# Patient Record
Sex: FEMALE | Race: WHITE | Employment: OTHER | URBAN - METROPOLITAN AREA
[De-identification: names, ages, dates, MRNs, and addresses within clinical notes are randomized per-mention and may not be internally consistent; named-entity substitution may affect disease eponyms.]

---

## 2017-06-09 PROBLEM — Z91.14 NON COMPLIANCE W MEDICATION REGIMEN: Status: ACTIVE | Noted: 2017-06-09

## 2017-07-05 ENCOUNTER — TELEPHONE (OUTPATIENT)
Dept: DIABETES SERVICES | Age: 77
End: 2017-07-05

## 2017-07-05 NOTE — TELEPHONE ENCOUNTER
Pt was a no show for her Diabetes Assessment this AM. Called and left a message to please call to reschedule assessment. Phone number provided.

## 2017-07-19 ENCOUNTER — TELEPHONE (OUTPATIENT)
Dept: DIABETES SERVICES | Age: 77
End: 2017-07-19

## 2018-01-03 PROBLEM — F33.9 RECURRENT DEPRESSION (HCC): Status: ACTIVE | Noted: 2018-01-03

## 2018-01-03 PROBLEM — E55.9 VITAMIN D DEFICIENCY: Status: ACTIVE | Noted: 2018-01-03

## 2018-05-11 PROBLEM — R01.1 MURMUR, CARDIAC: Status: ACTIVE | Noted: 2018-05-11

## 2019-05-14 PROBLEM — E11.21 TYPE 2 DIABETES WITH NEPHROPATHY (HCC): Status: ACTIVE | Noted: 2019-05-14

## 2019-09-01 ENCOUNTER — HOSPITAL ENCOUNTER (EMERGENCY)
Age: 79
Discharge: HOME | End: 2019-09-02
Attending: STUDENT IN AN ORGANIZED HEALTH CARE EDUCATION/TRAINING PROGRAM
Payer: MEDICARE

## 2019-09-01 ENCOUNTER — APPOINTMENT (OUTPATIENT)
Dept: GENERAL RADIOLOGY | Age: 79
End: 2019-09-01
Attending: STUDENT IN AN ORGANIZED HEALTH CARE EDUCATION/TRAINING PROGRAM
Payer: MEDICARE

## 2019-09-01 ENCOUNTER — APPOINTMENT (OUTPATIENT)
Dept: CT IMAGING | Age: 79
End: 2019-09-01
Attending: STUDENT IN AN ORGANIZED HEALTH CARE EDUCATION/TRAINING PROGRAM
Payer: MEDICARE

## 2019-09-01 DIAGNOSIS — S09.90XA CLOSED HEAD INJURY, INITIAL ENCOUNTER: Primary | ICD-10-CM

## 2019-09-01 DIAGNOSIS — S00.83XA FACIAL CONTUSION, INITIAL ENCOUNTER: ICD-10-CM

## 2019-09-01 DIAGNOSIS — H11.31 SUBCONJUNCTIVAL HEMORRHAGE OF RIGHT EYE: ICD-10-CM

## 2019-09-01 PROCEDURE — 73562 X-RAY EXAM OF KNEE 3: CPT

## 2019-09-01 PROCEDURE — 74011000250 HC RX REV CODE- 250: Performed by: STUDENT IN AN ORGANIZED HEALTH CARE EDUCATION/TRAINING PROGRAM

## 2019-09-01 PROCEDURE — 70450 CT HEAD/BRAIN W/O DYE: CPT

## 2019-09-01 PROCEDURE — 99284 EMERGENCY DEPT VISIT MOD MDM: CPT | Performed by: STUDENT IN AN ORGANIZED HEALTH CARE EDUCATION/TRAINING PROGRAM

## 2019-09-01 PROCEDURE — 70486 CT MAXILLOFACIAL W/O DYE: CPT

## 2019-09-01 PROCEDURE — 73110 X-RAY EXAM OF WRIST: CPT

## 2019-09-01 PROCEDURE — 73130 X-RAY EXAM OF HAND: CPT

## 2019-09-01 PROCEDURE — 73502 X-RAY EXAM HIP UNI 2-3 VIEWS: CPT

## 2019-09-01 RX ORDER — TETRACAINE HYDROCHLORIDE 5 MG/ML
1 SOLUTION OPHTHALMIC
Status: COMPLETED | OUTPATIENT
Start: 2019-09-01 | End: 2019-09-01

## 2019-09-01 RX ADMIN — FLUORESCEIN SODIUM 1 STRIP: 1 STRIP OPHTHALMIC at 22:16

## 2019-09-01 RX ADMIN — TETRACAINE HYDROCHLORIDE 1 DROP: 5 SOLUTION OPHTHALMIC at 22:16

## 2019-09-02 VITALS
OXYGEN SATURATION: 97 % | TEMPERATURE: 98.3 F | DIASTOLIC BLOOD PRESSURE: 61 MMHG | BODY MASS INDEX: 32.57 KG/M2 | WEIGHT: 177 LBS | RESPIRATION RATE: 16 BRPM | HEART RATE: 94 BPM | HEIGHT: 62 IN | SYSTOLIC BLOOD PRESSURE: 112 MMHG

## 2019-09-02 RX ORDER — TRAMADOL HYDROCHLORIDE 50 MG/1
50 TABLET ORAL
Qty: 10 TAB | Refills: 0 | Status: SHIPPED | OUTPATIENT
Start: 2019-09-02 | End: 2019-09-05

## 2019-09-02 NOTE — ED NOTES
I have reviewed discharge instructions with the patient. The patient verbalized understanding. Patient left ED via Discharge Method: ambulatory to Home with family. Opportunity for questions and clarification provided. Patient given 1 scripts. To continue your aftercare when you leave the hospital, you may receive an automated call from our care team to check in on how you are doing. This is a free service and part of our promise to provide the best care and service to meet your aftercare needs.  If you have questions, or wish to unsubscribe from this service please call [phone]. Thank you for Choosing our [facility] Emergency Department.

## 2019-09-02 NOTE — ED TRIAGE NOTES
Pt coming from home by [facility] for a fall. States she fell out of the bed tonight and hit her rigth eye. She has laceration right above her right eye. EMS states the sclera is bloody and difficult to assess. Patient states she is blind in her left eye and she can only see the light out of the right. Patient complaining of left and left knee pain. Patient just rolled out of the bed accidentally. Denies loss of consciousness. Ems gave 5mg of morphine IV and 4mg of zofran. Pain is down to a 4 after the morphine.

## 2019-09-02 NOTE — ED PROVIDER NOTES
21-year-old female patient presents to the emergency department via EMS with reports of right facial pain following a rule out of bed. Patient states she struck her face against a bedside dresser drawer. She denies loss of consciousness. Reports significant pain about the right face and eye. EMS states that patient reported that she could only see light out of the eye. No associated nausea, vomiting. She denies any neck pain. No lightheaded or dizzy feeling prior to or following the event. Patient is received 5 mg of morphine IV in route. She does not take blood thinners. The history is provided by the patient, the EMS personnel and a relative. No  was used. Eye Injury    This is a new problem. The current episode started 1 to 2 hours ago. The problem occurs constantly. The problem has not changed since onset. The right eye is affected. The injury mechanism was a direct trauma. The pain is moderate. Pertinent negatives include no numbness, no nausea, no vomiting, no weakness, no fever and no dizziness.         Past Medical History:   Diagnosis Date    Anatomical narrow angle borderline glaucoma 6/10/2015    Blindness of left eye     Chronic angle-closure glaucoma 6/10/2015    COPD     COPD (chronic obstructive pulmonary disease) ([facility] [address] 6/10/2015    Cortical, lamellar, or zonular cataract, nonsenile 6/10/2015    Depression     Diabetes ([address]     Diverticulosis     Gastrointestinal disorder     diverticultits x 2 episodes    GERD (gastroesophageal reflux disease) 6/10/2015    Hyperlipemia 6/10/2015    Hypertension 6/10/2015    Memory loss 6/10/2015    Methicillin resistant Staphylococcus aureus in conditions classified elsewhere and of unspecified site     Nearsightedness 6/10/2015    Neuropathy 6/10/2015    Nuclear cataract, nonsenile 6/10/2015    Obesity     Optic atrophy, 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    Osteoarthritis     Osteoarthrosis involving, or with mention of more than one site, but not specified as generalized, site unspecified(715.80) 6/10/2015    Other and combined forms of senile cataract 6/10/2015    Other ill-defined conditions(799.89)     back pain    Pain in limb     Psychiatric disorder     depression    Regular astigmatism 6/10/2015    Severe stage glaucoma 6/10/2015    Type II or unspecified type diabetes mellitus with neurological manifestations, not stated as uncontrolled(250.60) ([facility] [address] 9/27/9558    Umbilical hernia     no repair       Past Surgical History:   Procedure Laterality Date    HX APPENDECTOMY      HX CHOLECYSTECTOMY  2000    HX HEMORRHOIDECTOMY  1982    HX HYSTERECTOMY      HX OTHER SURGICAL  1978    benign growth removal from vocal cords    HX OTHER SURGICAL      full dentures r/t tooth extractions    HX OTHER SURGICAL  2001    both eyes optic nerve    HX OTHER SURGICAL  4/2012    lipoma removal    HX OTHER SURGICAL  1978    exploratory    HX OTHER SURGICAL  2/2012    removed blood clot from her anus    HX POLYPECTOMY  1992    HX TONSILLECTOMY           Family History:   Problem Relation Age of Onset    Diabetes Mother     Stroke Mother     Hypertension Mother     Diabetes Father     Heart Disease Father     Hypertension Father     Other Maternal Julián Daley tumors, cyst in kidneys    Diabetes Maternal Uncle     Cancer Paternal Aunt         lung    COPD Paternal Aunt     Cancer Paternal Uncle         throat cancer    Heart Disease Maternal Grandfather     Diabetes Maternal Grandfather     Cancer Paternal Grandfather         throat cancer    Other Other         clogged arteries diverticulitis    Breast Cancer Sister 72       Social History     Socioeconomic History    Marital status:      Spouse name: Not on file    Number of children: Not on file    Years of education: Not on file    Highest education level: Not on file   Occupational History    Not on file   Social Needs    Financial resource strain: Not on file    Food insecurity:     Worry: Not on file     Inability: Not on file    Transportation needs:     Medical: Not on file     Non-medical: Not on file   Tobacco Use    Smoking status: Former Smoker     Packs/day: 1.00     Years: 30.00     Pack years: 30.00     Last attempt to quit: 2000     Years since quittin.5    Smokeless tobacco: Never Used   Substance and Sexual Activity    Alcohol use: No    Drug use: No    Sexual activity: Not Currently   Lifestyle    Physical activity:     Days per week: Not on file     Minutes per session: Not on file    Stress: Not on file   Relationships    Social connections:     Talks on phone: Not on file     Gets together: Not on file     Attends Rastafarian service: Not on file     Active member of club or organization: Not on file     Attends meetings of clubs or organizations: Not on file     Relationship status: Not on file    Intimate partner violence:     Fear of current or ex partner: Not on file     Emotionally abused: Not on file     Physically abused: Not on file     Forced sexual activity: Not on file   Other Topics Concern    Not on file   Social History Narrative    Not on file         ALLERGIES: Demerol [meperidine] and Morphine    Review of Systems   Constitutional: Negative for chills, diaphoresis and fever. HENT: Negative for congestion, sneezing and sore throat. Eyes: Negative for visual disturbance. Respiratory: Negative for cough, chest tightness, shortness of breath and wheezing. Cardiovascular: Negative for chest pain and leg swelling. Gastrointestinal: Negative for abdominal pain, blood in stool, diarrhea, nausea and vomiting. Endocrine: Negative for polyuria. Genitourinary: Negative for difficulty urinating, dysuria, flank pain, hematuria and urgency. Musculoskeletal: Positive for arthralgias and myalgias. Negative for back pain, neck pain and neck stiffness.    Skin: Negative for color change and rash.   Neurological: Positive for headaches. Negative for dizziness, syncope, speech difficulty, weakness, light-headedness and numbness. Psychiatric/Behavioral: Negative for behavioral problems. All other systems reviewed and are negative. Vitals:    09/01/19 2154   BP: 142/73   Pulse: 97   Resp: 18   Temp: 98.2 °F (36.8 °C)   SpO2: 97%   Weight: 80.3 kg (177 lb)   Height: 5' 2\" (1.575 m)            Physical Exam   Constitutional: She is oriented to person, place, and time. She appears well-developed and well-nourished. No distress. Alert and oriented to person place and time. No acute distress, speaks in clear, fluid sentences. HENT:   Head: Normocephalic. Right Ear: External ear normal.   Left Ear: External ear normal.   Nose: Nose normal.   There is significant swelling to the right face with ecchymosis, near complete occlusion of the patient's right eye secondary to swelling of both the upper and lower lid. No hemotympanum or hernandez sign. Eyes: Pupils are equal, round, and reactive to light. EOM are normal. Right eye exhibits no chemosis, no discharge, no exudate and no hordeolum. No foreign body present in the right eye. Left eye exhibits no chemosis, no discharge, no exudate and no hordeolum. No foreign body present in the left eye. Right conjunctiva is not injected. Right conjunctiva has a hemorrhage. Left conjunctiva is not injected. Left conjunctiva has no hemorrhage. No scleral icterus. Right eye exhibits normal extraocular motion and no nystagmus. Left eye exhibits normal extraocular motion and no nystagmus. Right pupil is round and reactive. Left pupil is round and reactive. Pupils are equal.   Large subconjunctival hematoma covers the patient's entire sclera however the patient's right pupil appears normal.  It accommodates and is reactive to light on exam.  Extraocular muscles are intact. Legally blind in the left eye.     Floor seen staining reveals no focal uptake of stain, negative Luis A sign   Neck: Normal range of motion. Cardiovascular: Normal rate, regular rhythm, normal heart sounds and intact distal pulses. Exam reveals no gallop and no friction rub. No murmur heard. Pulmonary/Chest: Effort normal and breath sounds normal. No stridor. No respiratory distress. She has no decreased breath sounds. She has no wheezes. She has no rhonchi. She has no rales. She exhibits no tenderness. Abdominal: Soft. She exhibits no distension and no mass. There is no tenderness. There is no rebound and no guarding. No hernia. Musculoskeletal: Normal range of motion. She exhibits no edema, tenderness or deformity. Patient reports subjective pain to the left wrist and hand. There is a superficial abrasion to the left knee. Some pain with range of motion testing over the hip   Neurological: She is alert and oriented to person, place, and time. No cranial nerve deficit. Skin: Skin is warm and dry. She is not diaphoretic. Nursing note and vitals reviewed.        MDM  Number of Diagnoses or Management Options     Amount and/or Complexity of Data Reviewed  Tests in the radiology section of CPT®: ordered and reviewed  Tests in the medicine section of CPT®: ordered and reviewed    Risk of Complications, Morbidity, and/or Mortality  Presenting problems: moderate  Diagnostic procedures: low  Management options: moderate    Patient Progress  Patient progress: stable         Procedures

## 2019-09-02 NOTE — DISCHARGE INSTRUCTIONS
Patient Education      Arrange follow-up with [facility] eye group within 1 week. Return for worsening symptoms, concerns or questions. Take the medication for pain as needed. You should start with over-the-counter medications such as Tylenol or ibuprofen to manage pain. Apply ice to the area to help with pain and swelling. Arrange follow-up care within 1 week with your primary care provider as well. Head Injury: Care Instructions  Your Care Instructions    Most injuries to the head are minor. Bumps, cuts, and scrapes on the head and face usually heal well and can be treated the same as injuries to other parts of the body. Although it's rare, once in a while a more serious problem shows up after you are home. So it's good to be on the lookout for symptoms for a day or two. Follow-up care is a key part of your treatment and safety. Be sure to make and go to all appointments, and call your doctor if you are having problems. It's also a good idea to know your test results and keep a list of the medicines you take. How can you care for yourself at home? · Follow your doctor's instructions. He or she will tell you if you need someone to watch you closely for the next 24 hours or longer. · Take it easy for the next few days or more if you are not feeling well. · Ask your doctor when it's okay for you to go back to activities like driving a car, riding a bike, or operating machinery. When should you call for help? Call 911 anytime you think you may need emergency care.  For example, call if:    · You have a seizure.     · You passed out (lost consciousness).     · You are confused or can't stay awake.    Call your doctor now or seek immediate medical care if:    · You have new or worse vomiting.     · You feel less alert.     · You have new weakness or numbness in any part of your body.    Watch closely for changes in your health, and be sure to contact your doctor if:    · You do not get better as expected.     · You have new symptoms, such as headaches, trouble concentrating, or changes in mood. Where can you learn more? Go to http://elijah-christian.info/. Enter M146 in the search box to learn more about \"Head Injury: Care Instructions. \"  Current as of: March 28, 2019  Content Version: 12.1  © 5183-0194 [employer]. Care instructions adapted under license by [employer] (which disclaims liability or warranty for this information). If you have questions about a medical condition or this instruction, always ask your healthcare professional. [place] [address] any warranty or liability for your use of this information. Patient Education        Subconjunctival Hemorrhage: Care Instructions  Your Care Instructions    Sometimes small blood vessels in the white of the eye can break, causing a red spot or speck. This is called a subconjunctival hemorrhage. The blood vessels may break when you sneeze, cough, vomit, strain, or bend over. Sometimes there is no clear cause. The blood may look alarming, especially if the spot is large. If there is no pain or vision change, there is usually no reason to worry, and the blood slowly will go away on its own in 2 to 3 weeks. Follow-up care is a key part of your treatment and safety. Be sure to make and go to all appointments, and call your doctor if you are having problems. It's also a good idea to know your test results and keep a list of the medicines you take. How can you care for yourself at home? · Watch for changes in your eye. It is normal for the red spot on your eyeball to change color as it heals. Just like a bruise on your skin, it may change from red to brown to purple to yellow. · Do not take aspirin or products that contain aspirin, which can increase bleeding. Use acetaminophen (Tylenol) if you need pain relief for another problem.   · Do not take two or more pain medicines at the same time unless the doctor told you to. Many pain medicines have acetaminophen, which is Tylenol. Too much acetaminophen (Tylenol) can be harmful. When should you call for help? Call your doctor now or seek immediate medical care if:    · You have signs of an eye infection, such as:  ? Pus or thick discharge coming from the eye.  ? Redness or swelling around the eye.  ? A fever.     · You see blood over the black part of your eye (pupil).     · You have any changes or problems in your vision.     · You have any pain in your eye.    Watch closely for changes in your health, and be sure to contact your doctor if:    · You do not get better as expected. Where can you learn more? Go to http://elijah-christian.info/. Enter L493 in the search box to learn more about \"Subconjunctival Hemorrhage: Care Instructions. \"  Current as of: July 17, 2018  Content Version: 12.1  © 8631-7880 [employer]. Care instructions adapted under license by [employer] (which disclaims liability or warranty for this information). If you have questions about a medical condition or this instruction, always ask your healthcare professional. [address] any warranty or liability for your use of this information.

## 2020-02-04 PROBLEM — S61.259A CAT BITE OF FINGER: Status: ACTIVE | Noted: 2017-09-28

## 2020-02-04 PROBLEM — E86.0 DEHYDRATION, MODERATE: Status: ACTIVE | Noted: 2017-09-28

## 2020-02-04 PROBLEM — M65.9 FLEXOR TENOSYNOVITIS OF FINGER: Status: ACTIVE | Noted: 2017-09-29

## 2020-02-04 PROBLEM — L03.012 CELLULITIS OF LEFT INDEX FINGER: Status: ACTIVE | Noted: 2017-09-28

## 2020-02-04 PROBLEM — G63 POLYNEUROPATHY ASSOCIATED WITH UNDERLYING DISEASE (HCC): Status: ACTIVE | Noted: 2017-09-29
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
## 2020-04-27 ENCOUNTER — TELEPHONE (OUTPATIENT)
Dept: CASE MANAGEMENT | Age: 80
End: 2020-04-27

## 2020-04-27 NOTE — TELEPHONE ENCOUNTER
Robert Aponte was contacted to activate their [employer] account. Spoke with son and offered to assist with account setup. They declined to sign up at this time. If patient declined, why? Patient doesn't have Internet or email. If patient accepted, was ACP introduced?  Not applicable    Romain Ferrer LPN

## 2020-12-01 ENCOUNTER — HOSPITAL ENCOUNTER (OUTPATIENT)
Dept: SURGERY | Age: 80
Discharge: HOME | End: 2020-12-01

## 2020-12-07 ENCOUNTER — ANESTHESIA EVENT (OUTPATIENT)
Dept: ENDOSCOPY | Age: 80
End: 2020-12-07
Payer: MEDICARE

## 2020-12-07 VITALS — BODY MASS INDEX: 29.08 KG/M2 | HEIGHT: 62 IN | WEIGHT: 158 LBS

## 2020-12-07 NOTE — PERIOP NOTES
Patient verified name, , and procedure; assessment questions, med list and instructions reviewed with patient's spouse Jagdeep Brooke patient request.    Type: 1a; abbreviated assessment per anesthesia guidelines  Labs per surgeon: none  Labs per anesthesia: none    Instructed pt that they will be notified via office/GI LAB for time of arrival to GI lab. Follow diet and prep instructions per Dr Laila Willingham instructions as follows: You will be on a clear liquid diet the day before your procedure and you may have any of the following clear liquids:   Water.  Strained fruit juices, without pulp, including apple, orange, white grape, or white cranberry.  Limeade or lemonade.  Coffee or tea (do not use any non-dairy creamer.)   Chicken broth.  Gelatin desserts, without added fruit or toppings (no red or purple gelatin.)  You should NOT:   Do NOT drink any milk products or use any milk products in coffee or tea.  Do NOT drink anything with red or purple dye.  Do NOT drink any alcoholic beverages. Bath or shower the night before and the am of surgery with non-moisturizing soap. No lotions, oils, powders, cologne on skin. No make up, eye make up or jewelry. Wear loose fitting comfortable, clean clothing. Must have adult present in building the entire time and MUST bring someone with you or arrange for someone to drive you home after the test.      You may take medications up to 3 hours prior to your procedure with sips of water only.  Medications to take Gabapentin, Namenda;  32 units Levemir Flex touch the night before and morning of procedure; use and bring Proair inhaler , patient to hold  none    The following discharge instructions reviewed with patient: medication given during procedure may cause drowsiness for several hours, therefore, do not drive or operate machinery for remainder of the day, no alcohol on the day of your procedure, resume regular diet and activity unless otherwise directed, you may experience abdominal distention for several hours that is relieved by the passage of gas. Contact your physician if you have any of the following: fever or chills, severe abdominal pain or excessive amount of bleeding or a large amount when having a bowel movement.  Occasional specks of blood with bowel movement would not be unusual.

## 2020-12-08 ENCOUNTER — ANESTHESIA (OUTPATIENT)
Dept: ENDOSCOPY | Age: 80
End: 2020-12-08
Payer: MEDICARE

## 2020-12-08 ENCOUNTER — HOSPITAL ENCOUNTER (OUTPATIENT)
Age: 80
Setting detail: OUTPATIENT SURGERY
Discharge: HOME | End: 2020-12-08
Attending: SURGERY | Admitting: SURGERY
Payer: MEDICARE

## 2020-12-08 VITALS
HEART RATE: 75 BPM | HEIGHT: 62 IN | BODY MASS INDEX: 28.71 KG/M2 | WEIGHT: 156 LBS | DIASTOLIC BLOOD PRESSURE: 66 MMHG | OXYGEN SATURATION: 100 % | RESPIRATION RATE: 18 BRPM | TEMPERATURE: 97.7 F | SYSTOLIC BLOOD PRESSURE: 139 MMHG

## 2020-12-08 DIAGNOSIS — Z12.11 SCREEN FOR COLON CANCER: ICD-10-CM

## 2020-12-08 PROBLEM — K57.90 DIVERTICULOSIS: Status: ACTIVE | Noted: 2020-12-08

## 2020-12-08 LAB
GLUCOSE BLD STRIP.AUTO-MCNC: 176 MG/DL (ref 65–100)
GLUCOSE BLD STRIP.AUTO-MCNC: 250 MG/DL (ref 65–100)
GLUCOSE BLD STRIP.AUTO-MCNC: 346 MG/DL (ref 65–100)

## 2020-12-08 PROCEDURE — 74011000250 HC RX REV CODE- 250: Performed by: NURSE ANESTHETIST, CERTIFIED REGISTERED

## 2020-12-08 PROCEDURE — 82962 GLUCOSE BLOOD TEST: CPT

## 2020-12-08 PROCEDURE — 76040000026: Performed by: SURGERY

## 2020-12-08 PROCEDURE — G0121 COLON CA SCRN NOT HI RSK IND: HCPCS | Performed by: SURGERY

## 2020-12-08 PROCEDURE — 2709999900 HC NON-CHARGEABLE SUPPLY: Performed by: SURGERY

## 2020-12-08 PROCEDURE — 74011250636 HC RX REV CODE- 250/636: Performed by: NURSE ANESTHETIST, CERTIFIED REGISTERED

## 2020-12-08 PROCEDURE — 74011636637 HC RX REV CODE- 636/637: Performed by: ANESTHESIOLOGY

## 2020-12-08 PROCEDURE — 76060000032 HC ANESTHESIA 0.5 TO 1 HR: Performed by: SURGERY

## 2020-12-08 PROCEDURE — 74011250636 HC RX REV CODE- 250/636: Performed by: ANESTHESIOLOGY

## 2020-12-08 RX ORDER — SODIUM CHLORIDE, SODIUM LACTATE, POTASSIUM CHLORIDE, CALCIUM CHLORIDE 600; 310; 30; 20 MG/100ML; MG/100ML; MG/100ML; MG/100ML
100 INJECTION, SOLUTION INTRAVENOUS CONTINUOUS
Status: DISCONTINUED | OUTPATIENT
Start: 2020-12-08 | End: 2020-12-08 | Stop reason: HOSPADM

## 2020-12-08 RX ORDER — LIDOCAINE HYDROCHLORIDE 20 MG/ML
INJECTION, SOLUTION EPIDURAL; INFILTRATION; INTRACAUDAL; PERINEURAL AS NEEDED
Status: DISCONTINUED | OUTPATIENT
Start: 2020-12-08 | End: 2020-12-08 | Stop reason: HOSPADM

## 2020-12-08 RX ORDER — PROPOFOL 10 MG/ML
INJECTION, EMULSION INTRAVENOUS
Status: DISCONTINUED | OUTPATIENT
Start: 2020-12-08 | End: 2020-12-08 | Stop reason: HOSPADM

## 2020-12-08 RX ADMIN — LIDOCAINE HYDROCHLORIDE 100 MG: 20 INJECTION, SOLUTION EPIDURAL; INFILTRATION; INTRACAUDAL; PERINEURAL at 09:38

## 2020-12-08 RX ADMIN — PHENYLEPHRINE HYDROCHLORIDE 100 MCG: 10 INJECTION INTRAVENOUS at 10:11

## 2020-12-08 RX ADMIN — PHENYLEPHRINE HYDROCHLORIDE 100 MCG: 10 INJECTION INTRAVENOUS at 10:02

## 2020-12-08 RX ADMIN — PROPOFOL 100 MCG/KG/MIN: 10 INJECTION, EMULSION INTRAVENOUS at 09:38

## 2020-12-08 RX ADMIN — INSULIN HUMAN 5 UNITS: 100 INJECTION, SOLUTION PARENTERAL at 08:49

## 2020-12-08 RX ADMIN — SODIUM CHLORIDE, SODIUM LACTATE, POTASSIUM CHLORIDE, AND CALCIUM CHLORIDE 100 ML/HR: 600; 310; 30; 20 INJECTION, SOLUTION INTRAVENOUS at 08:07

## 2020-12-08 NOTE — ANESTHESIA POSTPROCEDURE EVALUATION
Procedure(s):  COLONOSCOPY/ BMI 29.     total IV anesthesia    Anesthesia Post Evaluation        Patient location during evaluation: PACU  Patient participation: complete - patient participated  Level of consciousness: awake and alert  Pain management: adequate  Airway patency: patent  Anesthetic complications: no  Cardiovascular status: acceptable  Respiratory status: acceptable  Hydration status: acceptable  Post anesthesia nausea and vomiting:  controlled  Final Post Anesthesia Temperature Assessment:  Normothermia (36.0-37.5 degrees C)      INITIAL Post-op Vital signs:   Vitals Value Taken Time   /66 12/8/2020 10:40 AM   Temp     Pulse 75 12/8/2020 10:40 AM   Resp 18 12/8/2020 10:40 AM   SpO2 100 % 12/8/2020 10:40 AM

## 2020-12-08 NOTE — H&P
Jazzy Jackson Derek    12/8/2020    Date of Admission:  12/8/2020      Subjective:     Patient is a 79 y.o.  female presents for screening colonoscopy. The patient reports constipation. The patient denies family history of colon cancer. The patient has had a colonoscopy in the past. She is a poor historian but thinks she had a colonoscopy 30 years ago. Otherwise there is no reported rectal bleeding or melena. No changes in appetite or unusual wt loss. No abdominal pain or bloating. No reported changes in bowel habits.           Patient Active Problem List    Diagnosis Date Noted    Screen for colon cancer 12/08/2020    Type 2 diabetes with nephropathy ([facility] [address] 05/14/2019    Murmur, cardiac 05/11/2018    Recurrent depression ([facility] [address] 01/03/2018    Vitamin D deficiency 01/03/2018    Flexor tenosynovitis of finger 09/29/2017    Polyneuropathy associated with underlying disease ([facility] [address] 09/29/2017    Cat bite of finger 09/28/2017    Cellulitis of left index finger 09/28/2017    Dehydration, moderate 09/28/2017    Non compliance w medication regimen 06/09/2017    Hypertension 06/10/2015    Hyperlipemia 06/10/2015    Osteoarthritis of multiple joints 06/10/2015    GERD (gastroesophageal reflux disease) 06/10/2015    Neuropathy 06/10/2015    COPD (chronic obstructive pulmonary disease) ([facility] [address] 06/10/2015    Type II diabetes mellitus with neurological manifestations, uncontrolled ([facility] [address] 06/10/2015    Memory loss 06/10/2015    Regular astigmatism 06/10/2015    Severe stage glaucoma 06/10/2015    Optic atrophy 06/10/2015    Cortical, lamellar, or zonular cataract, nonsenile 06/10/2015    Nuclear cataract, nonsenile 06/10/2015    Nearsightedness 06/10/2015    Chronic angle-closure glaucoma 06/10/2015    Other and combined forms of senile cataract 06/10/2015    Anatomical narrow angle borderline glaucoma 06/10/2015     Past Medical History:   Diagnosis Date    Anatomical narrow angle borderline glaucoma 6/10/2015    Blindness of left eye     Chronic angle-closure glaucoma 6/10/2015    COPD     COPD (chronic obstructive pulmonary disease) ([facility] [address] 06/10/2015    managed by family dr Morenita Marroquin Cortical, lamellar, or zonular cataract, nonsenile 6/10/2015    Dementia ([facility] [address]     Depression     Diabetes ([facility] [address]     Diverticulosis     Gastrointestinal disorder     diverticultits x 2 episodes    GERD (gastroesophageal reflux disease) 6/10/2015    Hyperlipemia 6/10/2015    Hypertension 6/10/2015    Memory loss 6/10/2015    Methicillin resistant Staphylococcus aureus in conditions classified elsewhere and of unspecified site     Nearsightedness 6/10/2015    Neuropathy 6/10/2015    Nuclear cataract, nonsenile 6/10/2015    Obesity     Optic atrophy, 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    Osteoarthritis     Osteoarthrosis involving, or with mention of more than one site, but not specified as generalized, site unspecified(715.80) 6/10/2015    Other and combined forms of senile cataract 6/10/2015    Other ill-defined conditions(799.89)     back pain    Pain in limb     Psychiatric disorder     depression    Regular astigmatism 6/10/2015    Severe stage glaucoma 6/10/2015    Type II or unspecified type diabetes mellitus with neurological manifestations, not stated as uncontrolled(250.60) ([facility] [address] 06/10/2015    does not do fbs checks; today 12/8/20 fbs 279; Hgb A1c: 9.3 on 6/8/60    Umbilical hernia     no repair      Past Surgical History:   Procedure Laterality Date    HX APPENDECTOMY      HX CHOLECYSTECTOMY  2000    HX HEMORRHOIDECTOMY  1982    HX HYSTERECTOMY      HX OTHER SURGICAL  1978    benign growth removal from vocal cords    HX OTHER SURGICAL      full dentures r/t tooth extractions    HX OTHER SURGICAL  2001    both eyes optic nerve    HX OTHER SURGICAL  4/2012    lipoma removal    HX OTHER SURGICAL  1978    exploratory    HX OTHER SURGICAL  2/2012    removed blood clot from her anus    HX POLYPECTOMY [address]        Prior to Admission Medications   Prescriptions Last Dose Informant Patient Reported? Taking? Blood-Glucose Meter monitoring kit   No No   Sig: One touch verio glucometer Dispense other if formulary requiresDispense 100 ea lancets/test stripsCall for questions   Lancets misc   No No   Sig: To be used with dispensed glucometer to test 5 times daily   OTHER   No No   Sig: One touch verio glucometer  Ok to dispense other if not formulary   OTHER   No No   Sig: One each mechanical hospital bed to improve respiratory symptoms and avoid falls due to decreased sight   ProAir HFA 90 mcg/actuation inhaler 2020 at Unknown time  No Yes   Sig: Take 1 Puff by inhalation three (3) times daily as needed for Wheezing or Shortness of Breath. Patient taking differently: Take 1 Puff by inhalation three (3) times daily as needed for Wheezing or Shortness of Breath. Take / use AM day of surgery  per anesthesia protocols. Indications: chronic obstructive pulmonary disease   calcium carbonate-vitamin D3 (CALTRATE 600 + D) 600 mg (1,500 mg)-800 unit chew 2020 at Unknown time  No Yes   Sig: Take 1 Tab by mouth daily. Indications: osteoporosis   cholecalciferol, vitamin D3, (VITAMIN D3) 2,000 unit tab   No No   Sig: Take 1 Tab by mouth daily. gabapentin (NEURONTIN) 600 mg tablet 2020 at Unknown time  No Yes   Sig: Take 0.5 Tabs by mouth two (2) times a day. Patient taking differently: Take 600 mg by mouth two (2) times a day. Takes 1/2 tablet BID; Take / use AM day of surgery  per anesthesia protocols. Indications: neuropathic pain   glucose blood VI test strips (BLOOD GLUCOSE TEST) strip   No No   Sig: One touch verio or other if other glucometer dispensed to test 5 times daily   insulin detemir U-100 (Levemir FlexTouch U-100 Insuln) 100 unit/mL (3 mL) inpn   No No   Si Units by SubCUTAneous route two (2) times a day.  Indications: type 2 diabetes mellitus   Patient taking differently: 40 Units by SubCUTAneous route two (2) times a day. Take 32 units the night before procedure and 32 units day of procedure  Indications: type 2 diabetes mellitus   insulin lispro (HumaLOG KwikPen Insulin) 200 unit/mL (3 mL) inpn 2020 at Unknown time  No Yes   Sig: 10 Units by SubCUTAneous route three (3) times daily (with meals). Patient taking differently: 10 Units by SubCUTAneous route three (3) times daily (with meals). Indications: type 2 diabetes mellitus   linaCLOtide (Linzess) 145 mcg cap capsule 2020 at Unknown time  No Yes   Sig: Take 1 Cap by mouth Daily (before breakfast). Patient taking differently: Take 145 mcg by mouth Daily (before breakfast). Indications: chronic idiopathic constipation   memantine (NAMENDA) 10 mg tablet 2020 at Unknown time  No Yes   Sig: Take 1 Tab by mouth two (2) times a day. Patient taking differently: Take 10 mg by mouth two (2) times a day. Take / use AM day of surgery  per anesthesia protocols. Indications: moderate to severe Alzheimer's type dementia   polyethylene glycol (MIRALAX) 17 gram/dose powder   No No   Sig: Take 17 g by mouth two (2) times a day. Indications: constipation   Patient taking differently: Take 17 g by mouth as needed. Indications: constipation   sodium-potassium-mag sulfate (Suprep Bowel Prep Kit) 17.5-3.13-1.6 gram solr oral solution 2020 at Unknown time  No Yes   Sig: Follow instructions given to you by the office, not the ones in the box. Indications: Follow instructions given to you by the office, not the ones in the box.       Facility-Administered Medications: None     Allergies   Allergen Reactions    Demerol [Meperidine] Nausea and Vomiting     Stomach upset    Morphine Nausea and Vomiting     Stomach upset      Social History     Tobacco Use    Smoking status: Former Smoker     Packs/day: 1.00     Years: 30.00     Pack years: 30.00     Last attempt to quit: 2000     Years since quittin.8    Smokeless tobacco: Never Used   Substance Use Topics    Alcohol use: No      Social History     Social History Narrative    Not on file     Family History   Problem Relation Age of Onset    Diabetes Mother    [de-identified] Stroke Mother     Hypertension Mother     Diabetes Father     Heart Disease Father     Hypertension Father     Other Maternal Esaw Victor Hugo tumors, cyst in kidneys    Diabetes Maternal Uncle     Cancer Paternal Aunt         lung    COPD Paternal Aunt     Cancer Paternal Uncle         throat cancer    Heart Disease Maternal Grandfather     Diabetes Maternal Grandfather     Cancer Paternal Grandfather         throat cancer    Other Other         clogged arteries diverticulitis    Breast Cancer Sister 72        Current Facility-Administered Medications   Medication Dose Route Frequency    lactated Ringers infusion  100 mL/hr IntraVENous CONTINUOUS       Review of Systems  A comprehensive review of systems was negative except for that written in the HPI.     Objective:     Vitals:    12/08/20 0749   BP: (!) 147/69   Pulse: (!) 109   Resp: 18   Temp: 97.7 °F (36.5 °C)   SpO2: 95%   Weight: 156 lb (70.8 kg)   Height: 5' 2\" (1.575 m)     PHYSICAL EXAM   Physical Examination: General appearance - alert, well appearing, and in no distress  Mental status - alert, oriented to person, place, and time  Eyes - pupils equal and reactive, extraocular eye movements intact  Nose - normal and patent, no erythema, discharge or polyps  Neck - supple, no significant adenopathy  Chest - clear to auscultation, no wheezes, rales or rhonchi, symmetric air entry  Heart - normal rate, regular rhythm, normal S1, S2, no murmurs, rubs, clicks or gallops  Abdomen - soft, nontender, nondistended, no masses or organomegaly  Neurological - alert, oriented, normal speech, no focal findings or movement disorder noted  Musculoskeletal - no joint tenderness, deformity or swelling  Extremities - peripheral pulses normal, no pedal edema, no clubbing or cyanosis  Skin - normal coloration and turgor, no rashes, no suspicious skin lesions noted      No results for input(s): WBC, HGB, HCT, PLT, HGBEXT, HCTEXT, PLTEXT in the last 72 hours. No results for input(s): NA, K, CL, GLU, CO2, BUN, CREA, MG, PHOS, TROIQ, INR, BNPP, INREXT in the last 72 hours. No lab exists for component: TROIP  No results for input(s): PH, PCO2, PO2, HCO3 in the last 72 hours.     Assessment:     Hospital Problems  Date Reviewed: 11/16/2020          Codes Class Noted POA    * (Principal) Screen for colon cancer ICD-10-CM: Z12.11  ICD-9-CM: V76.51  12/8/2020 Unknown            Plan:   Screening colonoscopy        Sachin Lara, DO

## 2020-12-08 NOTE — PROCEDURES
Procedure in Detail:  Informed consent was obtained for the procedure. The patient was placed in the left lateral decubitus position and sedation was induced by anesthesia. The scope was inserted into the rectum and advanced under direct vision to the cecum, which was identified by the ileocecal valve and appendiceal orifice. The quality of the colonic preparation was fair. A careful inspection was made as the colonoscope was withdrawn, including a retroflexed view of the rectum; findings and interventions are described below. Appropriate photodocumentation was obtained. Findings:   Rectum:   Normal  Sigmoid:     - Excavated lesions:     - Diverticulosis  Descending Colon:   Normal  Transverse Colon:   Normal  Ascending Colon:   Normal  Cecum:   Normal  Terminal Ileum:   Not entered      Specimens: No specimens were collected. Complications: None; patient tolerated the procedure well. \    EBL - minimal    Recommendations:   - For colon cancer screening in this average-risk patient, colonoscopy may be repeated in 10 years.      Signed By: Gerard Lee DO                        December 8, 2020

## 2020-12-08 NOTE — DISCHARGE INSTRUCTIONS
Gastrointestinal Colonoscopy/Flexible Sigmoidoscopy - Lower Exam Discharge Instructions  1. Call Dr. Vianca Flanagan for any problems or questions. 2. Contact the doctors office for follow up appointment as directed  3. Ordinarily, you may resume regular diet and activity after exam unless otherwise specified by your physician. 4. Because of air put into your colon during exam, you may experience some abdominal distension, relieved by the passage of gas, for several hours. 5. Contact your physician if you have any of the following:  · Excessive amount of bleeding - large amount when having a bowel movement. Occasional specks of blood with bowel movement would not be unusual.  · Severe abdominal pain  · Fever or Chills  After general anesthesia or intravenous sedation, for 24 hours or while taking prescription Narcotics:  · Limit your activities  · A responsible adult needs to be with you for the next 24 hours  · Do not drive and operate hazardous machinery  · Do not make important personal or business decisions  · Do not drink alcoholic beverages  · If you have not urinated within 8 hours after discharge, and you are experiencing discomfort from urinary retention, please go to the nearest ED. · If you have sleep apnea and have a CPAP machine, please use it for all naps and sleeping. · Please use caution when taking narcotics and any of your home medications that may cause drowsiness. *  Please give a list of your current medications to your Primary Care Provider. *  Please update this list whenever your medications are discontinued, doses are      changed, or new medications (including over-the-counter products) are added. *  Please carry medication information at all times in case of emergency situations.     These are general instructions for a healthy lifestyle:  No smoking/ No tobacco products/ Avoid exposure to second hand smoke  Surgeon General's Warning:  Quitting smoking now greatly reduces serious risk to your health. Obesity, smoking, and sedentary lifestyle greatly increases your risk for illness  A healthy diet, regular physical exercise & weight monitoring are important for maintaining a healthy lifestyle    You may be retaining fluid if you have a history of heart failure or if you experience any of the following symptoms:  Weight gain of 3 pounds or more overnight or 5 pounds in a week, increased swelling in our hands or feet or shortness of breath while lying flat in bed. Please call your doctor as soon as you notice any of these symptoms; do not wait until your next office visit.

## 2020-12-08 NOTE — ANESTHESIA PREPROCEDURE EVALUATION
Relevant Problems   No relevant active problems       Anesthetic History   No history of anesthetic complications            Review of Systems / Medical History  Patient summary reviewed and pertinent labs reviewed    Pulmonary    COPD               Neuro/Psych         Psychiatric history and dementia (very mild)    Comments: Neuropathy  Cardiovascular    Hypertension          Hyperlipidemia    Exercise tolerance[de-identified] Questionable 4 METs - denied chest pain, SOB, syncope      GI/Hepatic/Renal     GERD           Endo/Other    Diabetes: poorly controlled    Arthritis     Other Findings              Physical Exam    Airway  Mallampati: I  TM Distance: 4 - 6 cm  Neck ROM: normal range of motion   Mouth opening: Normal     Cardiovascular    Rhythm: regular  Rate: normal    Murmur (faint): Grade 1, Aortic area  Pertinent negatives: No peripheral edema   Dental    Dentition: Edentulous     Pulmonary  Breath sounds clear to auscultation               Abdominal  GI exam deferred       Other Findings            Anesthetic Plan    ASA: 3  Anesthesia type: total IV anesthesia          Induction: Intravenous  Anesthetic plan and risks discussed with: Patient and Spouse      Of note, patient with h/o poorly controlled DM. Her blood sugar this morning was 346. After speaking with her and her spouse, they report that it has been poorly controlled at home as they haven't been able to find an appropriate medication regimen and patient is poorly compliant with her diet. I counseled them in depth on the importance of good blood sugar control and the risks that poorly controlled DM can impose. Patient has already completed her bowel prep. I informed the patient and her spouse of her increased risk with anesthesia and she and her spouse wish to proceed with the procedure today.

## 2021-01-07 PROBLEM — Z12.11 SCREEN FOR COLON CANCER: Status: RESOLVED | Noted: 2020-12-08 | Resolved: 2021-01-07

## 2021-02-16 PROBLEM — Z91.81 AT MODERATE RISK FOR FALL: Status: ACTIVE | Noted: 2021-02-16

## 2021-05-13 PROBLEM — Z91.81 AT HIGH RISK FOR FALLS: Status: ACTIVE | Noted: 2021-05-13

## 2021-09-16 DIAGNOSIS — I35.0 NONRHEUMATIC AORTIC VALVE STENOSIS: Primary | ICD-10-CM

## 2021-09-17 ENCOUNTER — TELEPHONE (OUTPATIENT)
Dept: CASE MANAGEMENT | Age: 81
End: 2021-09-17

## 2021-09-17 NOTE — TELEPHONE ENCOUNTER
Spoke with pt  regarding plans for upcoming appts scheduled for 9/24 with arrival time @1015. Instructed pt to enter hospital on the outpatient side ([address] go to 2nd floor, and check in at radiology. No food 4 hr prior and nothing to drink 2 hr prior to the CT scan except sips of water with medications. Hold am insulin. TAVR protocol CT: 10:45 am  Carotid ultrasound: 11:30 am      Pt verbalized understanding and contact information ([phone]) provided for any further questions.     Jacy Prado, Structural Heart Navigator

## 2021-09-24 ENCOUNTER — HOSPITAL ENCOUNTER (OUTPATIENT)
Dept: ULTRASOUND IMAGING | Age: 81
Discharge: HOME | End: 2021-09-24
Attending: INTERNAL MEDICINE
Payer: MEDICARE

## 2021-09-24 ENCOUNTER — HOSPITAL ENCOUNTER (OUTPATIENT)
Dept: CT IMAGING | Age: 81
Discharge: HOME | End: 2021-09-24
Attending: INTERNAL MEDICINE
Payer: MEDICARE

## 2021-09-24 ENCOUNTER — TELEPHONE (OUTPATIENT)
Dept: CASE MANAGEMENT | Age: 81
End: 2021-09-24

## 2021-09-24 DIAGNOSIS — I35.0 NONRHEUMATIC AORTIC VALVE STENOSIS: ICD-10-CM

## 2021-09-24 LAB — CREAT BLD-MCNC: 0.76 MG/DL (ref 0.8–1.5)

## 2021-09-24 PROCEDURE — 74011000258 HC RX REV CODE- 258: Performed by: INTERNAL MEDICINE

## 2021-09-24 PROCEDURE — 71275 CT ANGIOGRAPHY CHEST: CPT

## 2021-09-24 PROCEDURE — 82565 ASSAY OF CREATININE: CPT

## 2021-09-24 PROCEDURE — 93880 EXTRACRANIAL BILAT STUDY: CPT

## 2021-09-24 PROCEDURE — 75574 CT ANGIO HRT W/3D IMAGE: CPT

## 2021-09-24 PROCEDURE — 74011000636 HC RX REV CODE- 636: Performed by: INTERNAL MEDICINE

## 2021-09-24 RX ORDER — SODIUM CHLORIDE 0.9 % (FLUSH) 0.9 %
10 SYRINGE (ML) INJECTION
Status: COMPLETED | OUTPATIENT
Start: 2021-09-24 | End: 2021-09-24

## 2021-09-24 RX ADMIN — Medication 10 ML: at 11:09

## 2021-09-24 RX ADMIN — IOPAMIDOL 75 ML: 755 INJECTION, SOLUTION INTRAVENOUS at 11:06

## 2021-09-24 RX ADMIN — SODIUM CHLORIDE 100 ML: 900 INJECTION, SOLUTION INTRAVENOUS at 11:09

## 2021-09-24 NOTE — TELEPHONE ENCOUNTER
Patient instructions given for cardiac catheterization with possible intervention with Dr Francisco J Caraballo. Scheduled for 9/30 at 930, arrival time 2 hr prior. Patient instructed to bring all home medications in labeled bottles on the day of procedure or a list with the dosages. NPO after midnight the night prior to the procedure, except for medications. Patient informed to take Aspirin 81 mg x 4 on the day of procedure. Take 1/2 of insulin dose the night prior to the procedure and hold insulin am of procedure   Instructed they can take all other medications excluding vitamins & supplements. Patient verbalizes understanding of all instructions & denies any questions at this time. Informed that [facility], or someone from cath lab, may contact them with final details prior to cardiac catheterization. Informed also for potential overnight stay if an intervention is completed. Contact info provided.      Bishop Encinas, Structural Heart Navigator

## 2021-09-24 NOTE — NURSE NAVIGATOR
Educational information/pamphlet provided to the pt regarding aortic stenosis and treatment options (SAVR and TAVR) along with the CardioSmart tool for Shared decision making. Also explained that CT will be to evaluate pt for potential TAVR. Contact information provided for any further questions.     Tien Holt, Structural Heart Navigator

## 2021-09-27 PROCEDURE — 75571 CT HRT W/O DYE W/CA TEST: CPT | Performed by: INTERNAL MEDICINE

## 2021-09-27 PROCEDURE — 75574 CT ANGIO HRT W/3D IMAGE: CPT | Performed by: INTERNAL MEDICINE

## 2021-09-30 ENCOUNTER — HOSPITAL ENCOUNTER (OUTPATIENT)
Age: 81
Setting detail: OBSERVATION
Discharge: HOME | End: 2021-10-01
Attending: INTERNAL MEDICINE | Admitting: INTERNAL MEDICINE
Payer: MEDICARE

## 2021-09-30 DIAGNOSIS — Z98.61 POST PTCA: ICD-10-CM

## 2021-09-30 DIAGNOSIS — I10 PRIMARY HYPERTENSION: ICD-10-CM

## 2021-09-30 DIAGNOSIS — I35.0 NONRHEUMATIC AORTIC VALVE STENOSIS: ICD-10-CM

## 2021-09-30 LAB
ACT BLD: 577 SECS (ref 70–128)
ANION GAP SERPL CALC-SCNC: 4 MMOL/L (ref 7–16)
ATRIAL RATE: 70 BPM
BUN SERPL-MCNC: 13 MG/DL (ref 8–23)
CALCIUM SERPL-MCNC: 9.4 MG/DL (ref 8.3–10.4)
CALCULATED R AXIS, ECG10: -55 DEGREES
CALCULATED T AXIS, ECG11: 85 DEGREES
CHLORIDE SERPL-SCNC: 108 MMOL/L (ref 98–107)
CO2 SERPL-SCNC: 29 MMOL/L (ref 21–32)
CREAT SERPL-MCNC: 0.91 MG/DL (ref 0.6–1)
DIAGNOSIS, 93000: NORMAL
ERYTHROCYTE [DISTWIDTH] IN BLOOD BY AUTOMATED COUNT: 12.3 % (ref 11.9–14.6)
GLUCOSE BLD STRIP.AUTO-MCNC: 132 MG/DL (ref 65–100)
GLUCOSE BLD STRIP.AUTO-MCNC: 170 MG/DL (ref 65–100)
GLUCOSE BLD STRIP.AUTO-MCNC: 182 MG/DL (ref 65–100)
GLUCOSE SERPL-MCNC: 178 MG/DL (ref 65–100)
HCT VFR BLD AUTO: 37.6 % (ref 35.8–46.3)
HGB BLD-MCNC: 12.9 G/DL (ref 11.7–15.4)
MCH RBC QN AUTO: 31.9 PG (ref 26.1–32.9)
MCHC RBC AUTO-ENTMCNC: 34.3 G/DL (ref 31.4–35)
MCV RBC AUTO: 92.8 FL (ref 79.6–97.8)
NRBC # BLD: 0 K/UL (ref 0–0.2)
PLATELET # BLD AUTO: 143 K/UL (ref 150–450)
PMV BLD AUTO: 10.3 FL (ref 9.4–12.3)
POTASSIUM SERPL-SCNC: 4.2 MMOL/L (ref 3.5–5.1)
Q-T INTERVAL, ECG07: 430 MS
QRS DURATION, ECG06: 98 MS
QTC CALCULATION (BEZET), ECG08: 467 MS
RBC # BLD AUTO: 4.05 M/UL (ref 4.05–5.2)
SERVICE CMNT-IMP: ABNORMAL
SODIUM SERPL-SCNC: 141 MMOL/L (ref 136–145)
VENTRICULAR RATE, ECG03: 71 BPM
WBC # BLD AUTO: 4.8 K/UL (ref 4.3–11.1)

## 2021-09-30 PROCEDURE — 77030029997 HC DEV COM RDL R BND TELE -B: Performed by: INTERNAL MEDICINE

## 2021-09-30 PROCEDURE — 92978 ENDOLUMINL IVUS OCT C 1ST: CPT | Performed by: INTERNAL MEDICINE

## 2021-09-30 PROCEDURE — 80048 BASIC METABOLIC PNL TOTAL CA: CPT

## 2021-09-30 PROCEDURE — 92928 PRQ TCAT PLMT NTRAC ST 1 LES: CPT | Performed by: INTERNAL MEDICINE

## 2021-09-30 PROCEDURE — C1874 STENT, COATED/COV W/DEL SYS: HCPCS | Performed by: INTERNAL MEDICINE

## 2021-09-30 PROCEDURE — 74011000258 HC RX REV CODE- 258: Performed by: INTERNAL MEDICINE

## 2021-09-30 PROCEDURE — C1753 CATH, INTRAVAS ULTRASOUND: HCPCS | Performed by: INTERNAL MEDICINE

## 2021-09-30 PROCEDURE — 99152 MOD SED SAME PHYS/QHP 5/>YRS: CPT | Performed by: INTERNAL MEDICINE

## 2021-09-30 PROCEDURE — 82962 GLUCOSE BLOOD TEST: CPT

## 2021-09-30 PROCEDURE — 93005 ELECTROCARDIOGRAM TRACING: CPT | Performed by: INTERNAL MEDICINE

## 2021-09-30 PROCEDURE — C1769 GUIDE WIRE: HCPCS | Performed by: INTERNAL MEDICINE

## 2021-09-30 PROCEDURE — 99153 MOD SED SAME PHYS/QHP EA: CPT | Performed by: INTERNAL MEDICINE

## 2021-09-30 PROCEDURE — 74011250637 HC RX REV CODE- 250/637: Performed by: INTERNAL MEDICINE

## 2021-09-30 PROCEDURE — 85347 COAGULATION TIME ACTIVATED: CPT

## 2021-09-30 PROCEDURE — 74011636637 HC RX REV CODE- 636/637: Performed by: INTERNAL MEDICINE

## 2021-09-30 PROCEDURE — 85027 COMPLETE CBC AUTOMATED: CPT

## 2021-09-30 PROCEDURE — C1757 CATH, THROMBECTOMY/EMBOLECT: HCPCS | Performed by: INTERNAL MEDICINE

## 2021-09-30 PROCEDURE — 74011250636 HC RX REV CODE- 250/636: Performed by: INTERNAL MEDICINE

## 2021-09-30 PROCEDURE — 74011000250 HC RX REV CODE- 250: Performed by: INTERNAL MEDICINE

## 2021-09-30 PROCEDURE — 93454 CORONARY ARTERY ANGIO S&I: CPT | Performed by: INTERNAL MEDICINE

## 2021-09-30 PROCEDURE — 74011000636 HC RX REV CODE- 636: Performed by: INTERNAL MEDICINE

## 2021-09-30 PROCEDURE — C1725 CATH, TRANSLUMIN NON-LASER: HCPCS | Performed by: INTERNAL MEDICINE

## 2021-09-30 PROCEDURE — C1894 INTRO/SHEATH, NON-LASER: HCPCS | Performed by: INTERNAL MEDICINE

## 2021-09-30 PROCEDURE — 77030015766: Performed by: INTERNAL MEDICINE

## 2021-09-30 PROCEDURE — 99218 HC RM OBSERVATION: CPT

## 2021-09-30 PROCEDURE — 2709999900 HC NON-CHARGEABLE SUPPLY

## 2021-09-30 PROCEDURE — C1887 CATHETER, GUIDING: HCPCS | Performed by: INTERNAL MEDICINE

## 2021-09-30 PROCEDURE — 77030016699 HC CATH ANGI DX INFN1 CARD -A: Performed by: INTERNAL MEDICINE

## 2021-09-30 DEVICE — STENT RONYX20030UX RESOLUTE ONYX 2.00X30
Type: IMPLANTABLE DEVICE | Status: FUNCTIONAL
Brand: RESOLUTE ONYX™

## 2021-09-30 DEVICE — STENT RONYX27522UX RESOLUTE ONYX 2.75X22
Type: IMPLANTABLE DEVICE | Status: FUNCTIONAL
Brand: RESOLUTE ONYX™

## 2021-09-30 RX ORDER — SODIUM NITROPRUSSIDE 25 MG/ML
INJECTION INTRAVENOUS AS NEEDED
Status: DISCONTINUED | OUTPATIENT
Start: 2021-09-30 | End: 2021-09-30 | Stop reason: HOSPADM

## 2021-09-30 RX ORDER — ACETAMINOPHEN 325 MG/1
650 TABLET ORAL
Status: DISCONTINUED | OUTPATIENT
Start: 2021-09-30 | End: 2021-10-01 | Stop reason: HOSPADM

## 2021-09-30 RX ORDER — INSULIN GLARGINE 100 [IU]/ML
50 INJECTION, SOLUTION SUBCUTANEOUS
Status: DISCONTINUED | OUTPATIENT
Start: 2021-09-30 | End: 2021-10-01 | Stop reason: HOSPADM

## 2021-09-30 RX ORDER — CLOPIDOGREL BISULFATE 75 MG/1
TABLET ORAL AS NEEDED
Status: DISCONTINUED | OUTPATIENT
Start: 2021-09-30 | End: 2021-09-30 | Stop reason: HOSPADM

## 2021-09-30 RX ORDER — SODIUM CHLORIDE 0.9 % (FLUSH) 0.9 %
5-40 SYRINGE (ML) INJECTION EVERY 8 HOURS
Status: DISCONTINUED | OUTPATIENT
Start: 2021-09-30 | End: 2021-10-01 | Stop reason: HOSPADM

## 2021-09-30 RX ORDER — HEPARIN SODIUM 200 [USP'U]/100ML
INJECTION, SOLUTION INTRAVENOUS
Status: DISCONTINUED | OUTPATIENT
Start: 2021-09-30 | End: 2021-09-30 | Stop reason: HOSPADM

## 2021-09-30 RX ORDER — CLOPIDOGREL BISULFATE 75 MG/1
75 TABLET ORAL DAILY
Status: DISCONTINUED | OUTPATIENT
Start: 2021-10-01 | End: 2021-10-01 | Stop reason: HOSPADM

## 2021-09-30 RX ORDER — SODIUM CHLORIDE 9 MG/ML
75 INJECTION, SOLUTION INTRAVENOUS CONTINUOUS
Status: DISCONTINUED | OUTPATIENT
Start: 2021-09-30 | End: 2021-09-30

## 2021-09-30 RX ORDER — GUAIFENESIN 100 MG/5ML
81 LIQUID (ML) ORAL DAILY
Status: DISCONTINUED | OUTPATIENT
Start: 2021-10-01 | End: 2021-10-01 | Stop reason: HOSPADM

## 2021-09-30 RX ORDER — MIDAZOLAM HYDROCHLORIDE 1 MG/ML
INJECTION, SOLUTION INTRAMUSCULAR; INTRAVENOUS AS NEEDED
Status: DISCONTINUED | OUTPATIENT
Start: 2021-09-30 | End: 2021-09-30 | Stop reason: HOSPADM

## 2021-09-30 RX ORDER — SODIUM CHLORIDE 0.9 % (FLUSH) 0.9 %
5-40 SYRINGE (ML) INJECTION AS NEEDED
Status: DISCONTINUED | OUTPATIENT
Start: 2021-09-30 | End: 2021-10-01 | Stop reason: HOSPADM

## 2021-09-30 RX ORDER — IPRATROPIUM BROMIDE AND ALBUTEROL SULFATE 2.5; .5 MG/3ML; MG/3ML
3 SOLUTION RESPIRATORY (INHALATION)
Status: DISCONTINUED | OUTPATIENT
Start: 2021-09-30 | End: 2021-10-01 | Stop reason: HOSPADM

## 2021-09-30 RX ORDER — HYDROXYZINE 25 MG/1
25 TABLET, FILM COATED ORAL
Status: DISCONTINUED | OUTPATIENT
Start: 2021-09-30 | End: 2021-10-01 | Stop reason: HOSPADM

## 2021-09-30 RX ORDER — FAMOTIDINE 20 MG/1
20 TABLET, FILM COATED ORAL EVERY 24 HOURS
Status: DISCONTINUED | OUTPATIENT
Start: 2021-09-30 | End: 2021-10-01 | Stop reason: HOSPADM

## 2021-09-30 RX ORDER — MAG HYDROX/ALUMINUM HYD/SIMETH 200-200-20
SUSPENSION, ORAL (FINAL DOSE FORM) ORAL AS NEEDED
Status: DISCONTINUED | OUTPATIENT
Start: 2021-09-30 | End: 2021-09-30 | Stop reason: HOSPADM

## 2021-09-30 RX ORDER — GUAIFENESIN 100 MG/5ML
81 LIQUID (ML) ORAL ONCE
Status: COMPLETED | OUTPATIENT
Start: 2021-09-30 | End: 2021-09-30

## 2021-09-30 RX ORDER — FENTANYL CITRATE 50 UG/ML
INJECTION, SOLUTION INTRAMUSCULAR; INTRAVENOUS AS NEEDED
Status: DISCONTINUED | OUTPATIENT
Start: 2021-09-30 | End: 2021-09-30 | Stop reason: HOSPADM

## 2021-09-30 RX ORDER — HYDROCODONE BITARTRATE AND ACETAMINOPHEN 5; 325 MG/1; MG/1
1 TABLET ORAL
Status: DISCONTINUED | OUTPATIENT
Start: 2021-09-30 | End: 2021-10-01 | Stop reason: HOSPADM

## 2021-09-30 RX ORDER — LIDOCAINE HYDROCHLORIDE 10 MG/ML
INJECTION INFILTRATION; PERINEURAL AS NEEDED
Status: DISCONTINUED | OUTPATIENT
Start: 2021-09-30 | End: 2021-09-30 | Stop reason: HOSPADM

## 2021-09-30 RX ORDER — SODIUM CHLORIDE 9 MG/ML
75 INJECTION, SOLUTION INTRAVENOUS CONTINUOUS
Status: DISCONTINUED | OUTPATIENT
Start: 2021-09-30 | End: 2021-10-01 | Stop reason: HOSPADM

## 2021-09-30 RX ORDER — NITROGLYCERIN 0.4 MG/1
0.4 TABLET SUBLINGUAL
Status: DISCONTINUED | OUTPATIENT
Start: 2021-09-30 | End: 2021-10-01 | Stop reason: HOSPADM

## 2021-09-30 RX ORDER — DULOXETIN HYDROCHLORIDE 30 MG/1
30 CAPSULE, DELAYED RELEASE ORAL DAILY
Status: DISCONTINUED | OUTPATIENT
Start: 2021-10-01 | End: 2021-10-01 | Stop reason: HOSPADM

## 2021-09-30 RX ADMIN — Medication 10 ML: at 21:09

## 2021-09-30 RX ADMIN — FAMOTIDINE 20 MG: 20 TABLET ORAL at 17:04

## 2021-09-30 RX ADMIN — Medication 10 ML: at 13:44

## 2021-09-30 RX ADMIN — SODIUM CHLORIDE 75 ML/HR: 900 INJECTION, SOLUTION INTRAVENOUS at 13:43

## 2021-09-30 RX ADMIN — SODIUM CHLORIDE 75 ML/HR: 900 INJECTION, SOLUTION INTRAVENOUS at 08:06

## 2021-09-30 RX ADMIN — INSULIN GLARGINE 50 UNITS: 100 INJECTION, SOLUTION SUBCUTANEOUS at 21:09

## 2021-09-30 RX ADMIN — ASPIRIN 81 MG: 81 TABLET, CHEWABLE ORAL at 08:06

## 2021-09-30 NOTE — PROGRESS NOTES
Beto Ewing. MD Mabel Green. Katty Grimm MD           [facility] 6         9/20/2021 1940    REFERRING PHYSICIAN:  Dr. Sammy Espinoza:  The patient is a [de-identified] y.o. female who is seen for evaluation of aortic stenosis. She recently established care with new PCP who noted a cardiac murmur. Echocardiogram showed critical AS with MARTIN of 0.5cm2, mean gradient of 60mmHg and peak gradient of 96.4mmHg. She was seen in the office by Dr Jareth Sosa. LHC was planned. She underwent cardiac catheterization today that showed obstructive disease in the LAD. 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 She denies any chest discomfort but her  states she has complained of this. She complains of frequent dizziness. She denies any syncopal episodes. She uses a walker at baseline and has history of multiple falls.      Past Medical History:   Diagnosis Date    Anatomical narrow angle borderline glaucoma 6/10/2015    Blindness of left eye     Chronic angle-closure glaucoma 6/10/2015    COPD     COPD (chronic obstructive pulmonary disease) ([address] 06/10/2015    managed by family dr Monroe Cortical, lamellar, or zonular cataract, nonsenile 6/10/2015    Dementia ([address]     Depression     Diabetes ([address]     Diverticulosis     Gastrointestinal disorder     diverticultits x 2 episodes    GERD (gastroesophageal reflux disease) 6/10/2015    Hyperlipemia 6/10/2015    Hypertension 6/10/2015    Memory loss 6/10/2015    Methicillin resistant Staphylococcus aureus in conditions classified elsewhere and of unspecified site     Nearsightedness 6/10/2015    Neuropathy 6/10/2015    Nuclear cataract, nonsenile 6/10/2015    Obesity     Optic atrophy, 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    Osteoarthritis     Osteoarthrosis involving, or with mention of more than one site, but not specified as generalized, site unspecified(715.80) 6/10/2015    Other and combined forms of senile cataract 6/10/2015    Other ill-defined conditions(799.89)     back pain    Pain in limb     Psychiatric disorder     depression    Regular astigmatism 6/10/2015    Severe stage glaucoma 6/10/2015    Type II or unspecified type diabetes mellitus with neurological manifestations, not stated as uncontrolled(250.60) ([facility] 06/10/2015    does not do fbs checks; today 12/8/20 fbs 279; Hgb A1c: 9.3 on 0/0/43    Umbilical hernia     no repair       Past Surgical History:   Procedure Laterality Date    COLONOSCOPY N/A 12/8/2020    COLONOSCOPY/ BMI 29 performed by Stephanie Alcantara DO at [address] [facility] HX APPENDECTOMY      HX CHOLECYSTECTOMY  2000    HX HEMORRHOIDECTOMY  1982    HX HYSTERECTOMY      HX OTHER SURGICAL  1978    benign growth removal from vocal cords    HX OTHER SURGICAL      full dentures r/t tooth extractions    HX OTHER SURGICAL  2001    both eyes optic nerve    HX OTHER SURGICAL  4/2012    lipoma removal    HX OTHER SURGICAL  1978    exploratory    HX OTHER SURGICAL  2/2012    removed blood clot from her anus    HX POLYPECTOMY  1992    HX TONSILLECTOMY         Family History   Problem Relation Age of Onset    Diabetes Mother     Stroke Mother     Hypertension Mother     Diabetes Father     Heart Disease Father     Hypertension Father     Other Maternal Yoselin Jose tumors, cyst in kidneys    Diabetes Maternal Uncle     Cancer Paternal Aunt         lung    COPD Paternal Aunt     Cancer Paternal Uncle         throat cancer    Heart Disease Maternal Grandfather     Diabetes Maternal Grandfather     Cancer Paternal Grandfather         throat cancer    Other Other         clogged arteries diverticulitis    Breast Cancer Sister 72       Social History     Socioeconomic History    Marital status:      Spouse name: Not on file    Number of children: Not on file    Years of education: Not on file    Highest education level: Not on file   Occupational History  Not on file   Tobacco Use    Smoking status: Former Smoker     Packs/day: 1.00     Years: 30.00     Pack years: 30.00     Quit date: 2000     Years since quittin.6    Smokeless tobacco: Never Used   Vaping Use    Vaping Use: Never assessed   Substance and Sexual Activity    Alcohol use: No    Drug use: No    Sexual activity: Not Currently   Other Topics Concern    Not on file   Social History Narrative    Not on file     Social Determinants of Health     Financial Resource Strain: Low Risk     Difficulty of Paying Living Expenses: Not hard at all   Food Insecurity: No Food Insecurity    Worried About Running Out of Food in the Last Year: Never true    Keysha of Food in the Last Year: Never true   Transportation Needs: No Transportation Needs    Lack of Transportation (Medical): No    Lack of Transportation (Non-Medical): No   Physical Activity: Insufficiently Active    Days of Exercise per Week: 4 days    Minutes of Exercise per Session: 30 min   Stress: No Stress Concern Present    Feeling of Stress : Not at all   Social Connections: Socially Integrated    Frequency of Communication with Friends and Family: More than three times a week    Frequency of Social Gatherings with Friends and Family: More than three times a week    Attends Sikhism Services: More than 4 times per year   [employer] [facility] Wally or Organizations: Yes    Attends Club or Organization Meetings: More than 4 times per year    Marital Status:    Intimate Partner Violence: Not At Risk    Fear of Current or Ex-Partner: No    Emotionally Abused: No    Physically Abused: No    Sexually Abused: No       Allergies   Allergen Reactions    Demerol [Meperidine] Nausea and Vomiting     Stomach upset    Morphine Nausea and Vomiting     Stomach upset       No current facility-administered medications on file prior to encounter.      Current Outpatient Medications on File Prior to Encounter   Medication Sig Dispense Refill    DULoxetine (CYMBALTA) 30 mg capsule Take 1 Capsule by mouth daily. 1 per day for mood 90 Capsule 1    empagliflozin-linagliptin (Glyxambi) 25-5 mg tab 1 per day for diabetes 90 Tablet 3    gabapentin (NEURONTIN) 600 mg tablet 1 tid for neuropathy 90 Tablet 2    insulin detemir U-100 (Levemir FlexTouch U-100 Insuln) 100 unit/mL (3 mL) inpn 50 Units by SubCUTAneous route nightly for 90 days. 50 units q pm  Dispense 15 pens  Indications: type 2 diabetes mellitus 45 mL 3    omeprazole (PRILOSEC) 20 mg capsule For heartburn/reflux  TAKE 1 CAPSULE BY MOUTH EVERY DAY 90 Capsule 3    calcium carbonate-vitamin D3 (Caltrate 600 plus D) 600 mg (1,500 mg)-800 unit chew Take 1 Tab by mouth daily. Indications: osteoporosis, a condition of weak bones 90 Tab 3    ProAir HFA 90 mcg/actuation inhaler Take 1 Puff by inhalation three (3) times daily as needed for Wheezing or Shortness of Breath. 8.5 Inhaler 3    polyethylene glycol (MIRALAX) 17 gram/dose powder Take 17 g by mouth two (2) times a day. Indications: constipation (Patient taking differently: Take 17 g by mouth as needed. Indications: constipation) 510 g 1    insulin lispro (HumaLOG KwikPen Insulin) 200 unit/mL (3 mL) inpn 10 Units by SubCUTAneous route three (3) times daily (with meals). (Patient taking differently: 10 Units by SubCUTAneous route three (3) times daily (with meals).  Indications: type 2 diabetes mellitus) 6 mL 5    glucose blood VI test strips (BLOOD GLUCOSE TEST) strip One touch verio or other if other glucometer dispensed to test 5 times daily 100 Strip 11    OTHER One each mechanical hospital bed to improve respiratory symptoms and avoid falls due to decreased sight 1 Each 0    OTHER One touch verio glucometer  Ok to dispense other if not formulary 1 Each 0    Lancets misc To be used with dispensed glucometer to test 5 times daily 100 Each 11    Blood-Glucose Meter monitoring kit One touch verio glucometer Dispense other if formulary requiresDispense 100 ea lancets/test stripsCall for questions 1 Kit 0       REVIEW OF SYSTEMS:  Review of Systems   Constitutional: Negative for chills, fever, malaise/fatigue, weight gain and weight loss. HENT: Negative for ear pain, hearing loss, nosebleeds, sore throat and tinnitus. Eyes: Negative for blurred vision, vision loss in left eye and vision loss in right eye. Cardiovascular: Positive for chest pain. Negative for dyspnea on exertion, leg swelling, near-syncope, orthopnea, palpitations, paroxysmal nocturnal dyspnea and syncope. Respiratory: Negative for cough, hemoptysis, shortness of breath, sputum production and wheezing. Endocrine: Negative for cold intolerance, heat intolerance and polydipsia. Hematologic/Lymphatic: Does not bruise/bleed easily. Skin: Negative for color change and rash. Musculoskeletal: Negative for back pain, joint pain, joint swelling and myalgias. Gastrointestinal: Negative for abdominal pain, constipation, diarrhea, dysphagia, heartburn, hematemesis, melena, nausea and vomiting. Genitourinary: Negative for dysuria, frequency, hematuria and urgency. Neurological: Positive for dizziness. Negative for difficulty with concentration, headaches, light-headedness, numbness, paresthesias, seizures, vertigo and weakness. Psychiatric/Behavioral: Negative for altered mental status and depression.        Physical Exam  Vitals:    09/30/21 0800 09/30/21 0803   BP: 139/68    Pulse: 62    Resp: 16    Temp: 98.2 °F (36.8 °C)    SpO2: 98% 98%   Weight: 152 lb (68.9 kg)    Height: 5' 2\" (1.575 m)        Physical Exam:  General: Well Developed, Obese, No Acute Distress  HEENT: Normocephalic, pupils equal and round, no scleral icterus  Neck: supple, no JVD  Chest wall: No deformity  Heart: S1S2 with RRR with grade III/VI SMITA   Lungs: Clear throughout auscultation bilaterally without adventitious sounds  Abd: soft, nontender, nondistended, with good bowel sounds, no pulsatile masses  Ext: warm, no edema, calves supple/nontender, pulses 2+ bilaterally  Skin: warm and dry, no rashes, cyanosis, jaundice, ecchymoses or evidence of skin breakdown  Psychiatric: Normal mood and affect  Neurologic: Alert and oriented X 3, no focal deficit noted    Labs:   Recent Labs     09/30/21  0758      K 4.2   BUN 13   CREA 0.91   *   WBC 4.8   HGB 12.9   HCT 37.6   *       Lab Results   Component Value Date/Time    Cholesterol, total 224 (H) 08/07/2020 12:06 PM    HDL Cholesterol 50 08/07/2020 12:06 PM    LDL, calculated 137 (H) 08/07/2020 12:06 PM    VLDL, calculated 37 08/07/2020 12:06 PM    Triglyceride 183 (H) 08/07/2020 12:06 PM       Assessment:     SAVR vs TAVR discussed. She prefers TAVR. She would have difficulty with rehab from SAVR.        Juvencio Nick PA-C

## 2021-09-30 NOTE — ROUTINE PROCESS
TRANSFER - OUT REPORT:    [facility] with PCI  Dr. Marvin Anthony  RRA access    Versed 3mg, fentanyl 75mcg, plavix 600mg, mylanta 30ml,  100mcg nipride given IC by MD  angiomax for anticoagulation, bag to finish infusing  2  HUDSON to LAD  R band placed right wrist wit 12ml air    Verbal report given to Nathalie KEMP(name) on Celina Dubois  being transferred to cpru(unit) for routine progression of care       Report consisted of patients Situation, Background, Assessment and   Recommendations(SBAR). Information from the following report(s) Procedure Summary was reviewed with the receiving nurse. Lines:   Peripheral IV 09/30/21 Right Antecubital (Active)   Site Assessment Clean, dry, & intact 09/30/21 0804   Phlebitis Assessment 0 09/30/21 0804   Dressing Status Clean, dry, & intact 09/30/21 0804   Dressing Type Transparent 09/30/21 0804   Hub Color/Line Status Pink 09/30/21 0804       Peripheral IV 09/30/21 Left Antecubital (Active)        Opportunity for questions and clarification was provided.       Patient transported with:   [employer]

## 2021-09-30 NOTE — PROGRESS NOTES
Pt transported to 319. Right radial verified with RN. Hortencia Roscoe Site without bleeding, swelling, or hematoma.

## 2021-09-30 NOTE — Clinical Note
TRANSFER - OUT REPORT:     Verbal report given to: CPRU . Report consisted of patient's Situation, Background, Assessment and   Recommendations(SBAR). Opportunity for questions and clarification was provided. Patient transported with a Registered Nurse. Patient transported to: recovery.

## 2021-09-30 NOTE — PROGRESS NOTES
TRANSFER - IN REPORT:    Verbal report received from Nathalie RN(name) on Celina Dubois  being received from Cath Lab(unit) for routine progression of care      Report consisted of patients Situation, Background, Assessment and   Recommendations(SBAR). Information from the following report(s) SBAR, Kardex, Procedure Summary and Cardiac Rhythm . was reviewed with the receiving nurse. Opportunity for questions and clarification was provided. Assessment completed upon patients arrival to unit and care assumed. Telemetry monitor applied, bed low and locked, side rails x2. Patient has been oriented to room and instructed to use call light for assistance. Dual skin assessment completed with secondary RN which reveals the following:  Sacrum and heels intact with no hematoma present. Right radial cath site C/D/I with TR band in place. 12 cc in the band. Will start to pull air at 1345.

## 2021-09-30 NOTE — Clinical Note
TRANSFER - IN REPORT:     Verbal report received from: CPRU RN. Report consisted of patient's Situation, Background, Assessment and   Recommendations(SBAR). Opportunity for questions and clarification was provided. Assessment completed upon patient's arrival to unit and care assumed. Patient transported with a Registered Nurse.

## 2021-09-30 NOTE — PROGRESS NOTES
TRANSFER - OUT REPORT:    Verbal report given to Marleny KEMP on Celina Dubois  being transferred to [facility] for routine progression of care       Report consisted of patients Situation, Background, Assessment and   Recommendations(SBAR). Information from the following report(s) SBAR was reviewed with the receiving nurse. Lines:   Peripheral IV 09/30/21 Right Antecubital (Active)   Site Assessment Clean, dry, & intact 09/30/21 0804   Phlebitis Assessment 0 09/30/21 0804   Dressing Status Clean, dry, & intact 09/30/21 0804   Dressing Type Transparent 09/30/21 0804   Hub Color/Line Status Pink 09/30/21 0804       Peripheral IV 09/30/21 Left Antecubital (Active)        Opportunity for questions and clarification was provided.

## 2021-09-30 NOTE — ROUTINE PROCESS
Patient received to [address] [place] [address] room # 14  Ambulatory from [facility]. Patient scheduled for [facility] today with Dr Kayla Ram. Procedure reviewed & questions answered, voiced good understanding consent obtained & placed on chart. All medications and medical history reviewed. Will prep patient per orders. Patient & family updated on plan of care. The patient has a fraility score of 4-VULNERABLE, based on reports no recent falls and uses a walker for ambulation.

## 2021-09-30 NOTE — ROUTINE PROCESS
Bedside and Verbal shift change report given to self (oncoming nurse) by Hira Talley RN (offgoing nurse). Report included the following information SBAR, Kardex, ED Summary, Procedure Summary, MAR and Recent Results.

## 2021-09-30 NOTE — PROCEDURES
Brief Cardiac Procedure Note    Patient: Nena Marquis MRN: [id number]  SSN: [id number]    YOB: 1940  Age: [de-identified] y.o. Sex: female      Date of Procedure: 9/30/2021     Pre-procedure Diagnosis: Typical Angina    Post-procedure Diagnosis: Coronary Artery Disease    Procedure: Left Heart Catheterization with Percutaneous Coronary Intervention    Brief Description of Procedure: As above    Performed By: Lex Jaramillo MD     Assistants: None    Anesthesia: Moderate Sedation    Estimated Blood Loss: Less than 10 mL      Specimens: None    Implants: None    Findings: 80% prox and mid LAD. Overlapping stents. Post procedure IVUS. Post IVUS brief no-reflow responded to aspiration and nipride. Complications: None    Recommendations: Continue medical therapy.     Signed By: Lex Jaramillo MD     September 30, 2021

## 2021-09-30 NOTE — Clinical Note
Contrast Dose Calculator:   Patient's age: [de-identified].   Patient's sex: Female. Patient weight (kg) = 68.9. Creatinine level (mg/dL) = 0.91. Creatinine clearance (mL/min): 53.63. Contrast concentration (mg/mL) = 370. MACD = 300 mL. Max Contrast dose per Creatinine Cl calculator = 120.67 mL.

## 2021-10-01 VITALS
BODY MASS INDEX: 28.65 KG/M2 | HEIGHT: 62 IN | WEIGHT: 155.7 LBS | SYSTOLIC BLOOD PRESSURE: 105 MMHG | TEMPERATURE: 98.2 F | OXYGEN SATURATION: 99 % | DIASTOLIC BLOOD PRESSURE: 59 MMHG | HEART RATE: 59 BPM | RESPIRATION RATE: 16 BRPM

## 2021-10-01 LAB
ANION GAP SERPL CALC-SCNC: 7 MMOL/L (ref 7–16)
BASOPHILS # BLD: 0 K/UL (ref 0–0.2)
BASOPHILS NFR BLD: 0 % (ref 0–2)
BUN SERPL-MCNC: 15 MG/DL (ref 8–23)
CALCIUM SERPL-MCNC: 9.1 MG/DL (ref 8.3–10.4)
CHLORIDE SERPL-SCNC: 108 MMOL/L (ref 98–107)
CHOLEST SERPL-MCNC: 192 MG/DL
CO2 SERPL-SCNC: 27 MMOL/L (ref 21–32)
CREAT SERPL-MCNC: 0.91 MG/DL (ref 0.6–1)
DIFFERENTIAL METHOD BLD: ABNORMAL
EOSINOPHIL # BLD: 0.1 K/UL (ref 0–0.8)
EOSINOPHIL NFR BLD: 1 % (ref 0.5–7.8)
ERYTHROCYTE [DISTWIDTH] IN BLOOD BY AUTOMATED COUNT: 12.4 % (ref 11.9–14.6)
GLUCOSE BLD STRIP.AUTO-MCNC: 129 MG/DL (ref 65–100)
GLUCOSE SERPL-MCNC: 131 MG/DL (ref 65–100)
HCT VFR BLD AUTO: 35.4 % (ref 35.8–46.3)
HDLC SERPL-MCNC: 48 MG/DL (ref 40–60)
HDLC SERPL: 4 {RATIO}
HGB BLD-MCNC: 11.9 G/DL (ref 11.7–15.4)
IMM GRANULOCYTES # BLD AUTO: 0 K/UL (ref 0–0.5)
IMM GRANULOCYTES NFR BLD AUTO: 0 % (ref 0–5)
LDLC SERPL CALC-MCNC: 105 MG/DL
LYMPHOCYTES # BLD: 1 K/UL (ref 0.5–4.6)
LYMPHOCYTES NFR BLD: 21 % (ref 13–44)
MAGNESIUM SERPL-MCNC: 2.4 MG/DL (ref 1.8–2.4)
MCH RBC QN AUTO: 30.7 PG (ref 26.1–32.9)
MCHC RBC AUTO-ENTMCNC: 33.6 G/DL (ref 31.4–35)
MCV RBC AUTO: 91.2 FL (ref 79.6–97.8)
MONOCYTES # BLD: 0.4 K/UL (ref 0.1–1.3)
MONOCYTES NFR BLD: 8 % (ref 4–12)
NEUTS SEG # BLD: 3.4 K/UL (ref 1.7–8.2)
NEUTS SEG NFR BLD: 70 % (ref 43–78)
NRBC # BLD: 0 K/UL (ref 0–0.2)
PLATELET # BLD AUTO: 121 K/UL (ref 150–450)
PMV BLD AUTO: 10.4 FL (ref 9.4–12.3)
POTASSIUM SERPL-SCNC: 4.2 MMOL/L (ref 3.5–5.1)
RBC # BLD AUTO: 3.88 M/UL (ref 4.05–5.2)
SERVICE CMNT-IMP: ABNORMAL
SODIUM SERPL-SCNC: 142 MMOL/L (ref 136–145)
TRIGL SERPL-MCNC: 195 MG/DL (ref 35–150)
VLDLC SERPL CALC-MCNC: 39 MG/DL (ref 6–23)
WBC # BLD AUTO: 4.9 K/UL (ref 4.3–11.1)

## 2021-10-01 PROCEDURE — 74011250637 HC RX REV CODE- 250/637: Performed by: INTERNAL MEDICINE

## 2021-10-01 PROCEDURE — 82962 GLUCOSE BLOOD TEST: CPT

## 2021-10-01 PROCEDURE — 99217 PR OBSERVATION CARE DISCHARGE MANAGEMENT: CPT | Performed by: INTERNAL MEDICINE

## 2021-10-01 PROCEDURE — 83735 ASSAY OF MAGNESIUM: CPT

## 2021-10-01 PROCEDURE — 80061 LIPID PANEL: CPT

## 2021-10-01 PROCEDURE — 99218 HC RM OBSERVATION: CPT

## 2021-10-01 PROCEDURE — 80048 BASIC METABOLIC PNL TOTAL CA: CPT

## 2021-10-01 PROCEDURE — 85025 COMPLETE CBC W/AUTO DIFF WBC: CPT

## 2021-10-01 PROCEDURE — 36415 COLL VENOUS BLD VENIPUNCTURE: CPT

## 2021-10-01 RX ORDER — ATORVASTATIN CALCIUM 10 MG/1
10 TABLET, FILM COATED ORAL
Qty: 30 TABLET | Refills: 11 | Status: SHIPPED | OUTPATIENT
Start: 2021-10-01

## 2021-10-01 RX ORDER — NITROGLYCERIN 0.4 MG/1
0.4 TABLET SUBLINGUAL
Qty: 30 TABLET | Refills: 11 | Status: SHIPPED | OUTPATIENT
Start: 2021-10-01 | End: 2021-10-01 | Stop reason: SDUPTHER

## 2021-10-01 RX ORDER — GUAIFENESIN 100 MG/5ML
81 LIQUID (ML) ORAL DAILY
Status: SHIPPED | COMMUNITY
Start: 2021-10-01

## 2021-10-01 RX ORDER — CLOPIDOGREL BISULFATE 75 MG/1
75 TABLET ORAL DAILY
Qty: 90 TABLET | Refills: 3 | Status: SHIPPED | OUTPATIENT
Start: 2021-10-01

## 2021-10-01 RX ORDER — CLOPIDOGREL BISULFATE 75 MG/1
75 TABLET ORAL DAILY
Qty: 90 TABLET | Refills: 3 | Status: SHIPPED | OUTPATIENT
Start: 2021-10-01 | End: 2021-10-01 | Stop reason: SDUPTHER

## 2021-10-01 RX ORDER — NITROGLYCERIN 0.4 MG/1
0.4 TABLET SUBLINGUAL
Qty: 30 TABLET | Refills: 11 | Status: SHIPPED | OUTPATIENT
Start: 2021-10-01

## 2021-10-01 RX ADMIN — DULOXETINE HYDROCHLORIDE 30 MG: 30 CAPSULE, DELAYED RELEASE ORAL at 08:20

## 2021-10-01 RX ADMIN — CLOPIDOGREL BISULFATE 75 MG: 75 TABLET ORAL at 08:20

## 2021-10-01 RX ADMIN — ASPIRIN 81 MG: 81 TABLET, CHEWABLE ORAL at 08:21

## 2021-10-01 RX ADMIN — Medication 10 ML: at 05:48

## 2021-10-01 NOTE — ROUTINE PROCESS
Bedside and Verbal shift change report given to Ryland Cavazos RN (oncoming nurse) by self Yue duggan). Report included the following information SBAR, Kardex, ED Summary, Procedure Summary, MAR and Recent Results.

## 2021-10-01 NOTE — PROGRESS NOTES
Discharge instructions reviewed with patient. Prescriptions given for plavix, lipitor, nitroglycerin and med info sheets provided for all new medications. Opportunity for questions provided. Patient voiced understanding of all discharge instructions.

## 2021-10-01 NOTE — ROUTINE PROCESS
Bedside and Verbal shift change report given to self (oncoming nurse) by  Helio Elliott RN (offgoing nurse). Report included the following information SBAR, Kardex, Intake/Output, MAR, and Recent Results.

## 2021-10-01 NOTE — DISCHARGE INSTRUCTIONS
DISCHARGE SUMMARY from Nurse    DISPOSITION: The patient is being discharged home in stable condition on a low saturated fat, low cholesterol and low salt diet. The patient is instructed to advance activities as tolerated to the limit of fatigue or shortness of breath. The patient is instructed to avoid all heavy lifting, straining, stooping or squatting for 3-5 days. The patient is instructed to watch the cath site for bleeding/oozing; if seen, the patient is instructed to apply firm pressure with a clean cloth and call [phone] [facility] Cardiology at [phone]. The patient is instructed to watch for signs of infection which include: increasing area of redness, fever/hot to touch or purulent drainage at the catheterization site. The patient is instructed not to soak in a bathtub for 7-10 days, but is cleared to shower. The patient is instructed to call the office or return to the ER for immediate evaluation for any shortness of breath or chest pain not relieved by NTG. PATIENT INSTRUCTIONS:    After general anesthesia or intravenous sedation, for 24 hours or while taking prescription Narcotics:  · Limit your activities  · Do not drive and operate hazardous machinery  · Do not make important personal or business decisions  · Do  not drink alcoholic beverages  · If you have not urinated within 8 hours after discharge, please contact your surgeon on call. Report the following to your surgeon:  · Excessive pain, swelling, redness or odor of or around the surgical area  · Temperature over 100.5  · Nausea and vomiting lasting longer than 4 hours or if unable to take medications  · Any signs of decreased circulation or nerve impairment to extremity: change in color, persistent  numbness, tingling, coldness or increase pain  · Any questions    What to do at Home:      *  Please give a list of your current medications to your Primary Care Provider.     *  Please update this list whenever your medications are discontinued, doses are      changed, or new medications (including over-the-counter products) are added. *  Please carry medication information at all times in case of emergency situations. These are general instructions for a healthy lifestyle:    No smoking/ No tobacco products/ Avoid exposure to second hand smoke  Surgeon General's Warning:  Quitting smoking now greatly reduces serious risk to your health. Obesity, smoking, and sedentary lifestyle greatly increases your risk for illness    A healthy diet, regular physical exercise & weight monitoring are important for maintaining a healthy lifestyle    You may be retaining fluid if you have a history of heart failure or if you experience any of the following symptoms:  Weight gain of 3 pounds or more overnight or 5 pounds in a week, increased swelling in our hands or feet or shortness of breath while lying flat in bed. Please call your doctor as soon as you notice any of these symptoms; do not wait until your next office visit. The discharge information has been reviewed with the patient. The patient verbalized understanding. Discharge medications reviewed with the patient and appropriate educational materials and side effects teaching were provided. ___________________________________________________________________________________________________________________________________     Reducing Heart Attack Risk With Daily Medicine: Care Instructions  Your Care Instructions    If you are at risk for a heart attack, there are many medicines that can reduce your risk. These include:  · ACE inhibitors or ARBs. These are types of blood pressure medicines. They can reduce the risk of heart attacks and strokes if you are at high risk. · Statins and other cholesterol medicines. These lower cholesterol. They can also reduce the risk of a stroke. · Aspirin and other antiplatelets. These prevent blood clots.  They can help certain people lower their risk of a heart attack or stroke. · Beta-blocker medicines. These are a type of blood pressure and heart medicine. They can reduce the chance of early death if you have had a heart attack. All medicines can cause side effects. So it is important to understand the pros and cons of any medicine you take. It is also important to take your medicines exactly as your doctor tells you to. Follow-up care is a key part of your treatment and safety. Be sure to make and go to all appointments, and call your doctor if you are having problems. It's also a good idea to know your test results and keep a list of the medicines you take. ACE inhibitors  ACE (angiotensin-converting enzyme) inhibitors are used for three main reasons. They lower blood pressure, protect the kidneys, and prevent heart attacks and strokes. Examples include benazepril (Lotensin), lisinopril (Prinivil, Zestril), and ramipril (Altace). An angiotensin II receptor blocker (ARB) may be used instead of an ACE inhibitor. ARBs help you in the same ways as ACE inhibitors. Examples include candesartan (Atacand), irbesartan (Avapro), and losartan (Cozaar). Before you start taking an ACE inhibitor or an ARB, make sure your doctor knows if:  · You are taking a water pill (diuretic). · You are taking potassium pills or using salt substitutes. · You are pregnant or breastfeeding. · You have had a kidney transplant or other kidney problems. ACE inhibitors and ARBs can cause side effects. Call your doctor right away if you have:  · Trouble breathing. · Swelling in your face, head, neck, or tongue. · Dizziness or lightheadedness. · A dry cough. Statins  Statins lower cholesterol. Examples include atorvastatin (Lipitor), lovastatin (Mevacor), pravastatin (Pravachol), and simvastatin (Zocor). Before you start taking a statin, make sure your doctor knows if:  · You have had a kidney transplant or other kidney problems. · You have liver disease.   · You take any other prescription medicine, over-the-counter medicine, vitamins, supplements, or herbal remedies. · You are pregnant or breastfeeding. Statins can cause side effects. Call your doctor right away if you have:  · New, severe muscle aches. · Brown urine. Aspirin  Taking an aspirin every day can lower your risk for a heart attack. A heart attack occurs when a blood vessel in the heart gets blocked. When this happens, oxygen can't get to the heart muscle, and part of the heart dies. Aspirin can help prevent blood clots that can block the blood vessels. Talk to your doctor before you start taking aspirin every day. He or she may recommend that you take one low-dose aspirin (81 mg) tablet each day, with a meal and a full glass of water. Taking aspirin isn't right for everyone. This is because it can cause serious bleeding. And you may not be able to use aspirin if you:  · Have asthma. · Have an ulcer or other stomach problem. · Take some other medicine (called a blood thinner) that prevents blood clots. · Are allergic to aspirin. Before having a surgery or procedure, tell your doctor or dentist that you take aspirin. He or she will tell you if you should stop taking aspirin beforehand. Make sure that you understand exactly what your doctor wants you to do. Aspirin can cause side effects. Call your doctor right away if you have:  · Unusual bleeding or bruising. · Nausea, vomiting, or heartburn. · Black or bloody stools. Beta-blockers  Beta-blockers are used for three main reasons. They lower blood pressure, relieve angina symptoms (such as chest pain or pressure), and reduce the chances of a second heart attack. They include atenolol (Tenormin), carvedilol (Coreg), and metoprolol (Lopressor). Before you start taking a beta-blocker, make sure your doctor knows if you have:  · Severe asthma or frequent asthma attacks. · A very slow pulse (less than 55 beats a minute). Beta-blockers can cause side effects.  Call your doctor right away if you have:  · Wheezing or trouble breathing. · Dizziness or lightheadedness. · Asthma that gets worse. When should you call for help? Watch closely for changes in your health, and be sure to contact your doctor if you have any problems. Where can you learn more? Go to http://www.gray.com/. Enter R428 in the search box to learn more about \"Reducing Heart Attack Risk With Daily Medicine: Care Instructions. \"  Current as of: July 22, 2018  Content Version: 12.1  © 0462-7870 [employer]. Care instructions adapted under license by [employer] (which disclaims liability or warranty for this information). If you have questions about a medical condition or this instruction, always ask your healthcare professional. [address] any warranty or liability for your use of this information. Left Heart Catheterization: About This Test  What is it? Cardiac catheterization is a test to check the left side of your heart. Your doctor might look at the shape of your heart, the motion of your heart, or the blood pressure inside the chambers. Why is this test done? This test gives information about how your heart is working. It can:  · Check blood flow and blood pressure in the chambers of the heart. · Check the pumping action of the heart. · Find out if a heart defect is present and how severe it is. · Find out how well the heart valves work. What happens during the test?  · You will get medicine to help you relax. · A thin tube called a catheter is put into a blood vessel in the groin or the arm. The doctor moves the catheter through the blood vessel into your heart. · You will get a shot to numb the skin where the catheter goes in. You may feel pressure when the doctor moves the catheter through your blood vessel into your heart. · Dye may be injected into your heart.  Your doctor can watch on special monitors as the dye moves in your heart. The dye helps your doctor see blood flow in your heart. · You may feel hot or flushed for several seconds when the dye is put in.  · If a heart defect is found, cardiac catheterization sometimes is used to correct it during the test.  How long does it take? · The test will take about 30 minutes. If a problem is found and the doctor treats it, it can take a few hours longer. What happens after the test?  · You will stay in a room for at least a few hours to make sure the catheter site starts to heal. You may have a bandage or a compression device on your groin or arm to prevent bleeding. · If the catheter was placed in your groin, you may lie in bed for a few hours. If the catheter was put in your arm, you will need to keep your arm still for at least 1 hour. · You may or may not need to stay in the hospital overnight. You will get more instructions for what to do at home. · Drink plenty of fluids for several hours after the test.  Follow-up care is a key part of your treatment and safety. Be sure to make and go to all appointments, and call your doctor if you are having problems. It's also a good idea to know your test results and keep a list of the medicines you take. Where can you learn more? Go to http://www.gray.com/. Enter W306 in the search box to learn more about \"Left Heart Catheterization: About This Test.\"  Current as of: July 22, 2018  Content Version: 12.1  © 9796-3146 [employer]. Care instructions adapted under license by [employer] (which disclaims liability or warranty for this information). If you have questions about a medical condition or this instruction, always ask your healthcare professional. [place] [address] any warranty or liability for your use of this information.                        Cardiac Catheterization/Angiography Discharge Instructions    *Check the puncture site frequently for swelling or bleeding. If you see any bleeding, lie down and apply pressure over the area with a clean town or washcloth. Notify your doctor for any redness, swelling, drainage or oozing from the puncture site. Notify your doctor for any fever or chills. *If the leg or arm with the puncture becomes cold, numb or painful, call [facility] Cardiology    *Activity should be limited for the next 48 hours. Climb stairs as little as possible and avoid any stooping, bending or strenuous activity for 48 hours. No heavy lifting (anything over 10 pounds) for three days. *Do not drive for 48 hours. *You may resume your usual diet. Drink more fluids than usual.    *Have a responsible person drive you home and stay with you for at least 24 hours after your heart catheterization/angiography. *You may remove the bandage from your Right and Arm in 24 hours. You may shower in 24 hours. No tub baths, hot tubs or swimming for one week. Do not place any lotions, creams, powders, ointments over the puncture site for one week. You may place a clean band-aid over the puncture site each day for 5 days. Change this daily. Learning About Coronary Artery Disease (CAD)  What is coronary artery disease? Coronary artery disease (CAD) occurs when plaque builds up in the arteries that bring oxygen-rich blood to your heart. Plaque is a fatty substance made of cholesterol, calcium, and other substances in the blood. This process is called hardening of the arteries, or atherosclerosis. What happens when you have coronary artery disease? · Plaque may narrow the coronary arteries. Narrowed arteries cause poor blood flow. This can lead to angina symptoms such as chest pain or discomfort. If blood flow is completely blocked, you could have a heart attack. · You can slow CAD and reduce the risk of future problems by making changes in your lifestyle. These include quitting smoking and eating heart-healthy foods.   · Treatments for CAD, along with changes in your lifestyle, can help you live a longer and healthier life. How can you prevent coronary artery disease? · Do not smoke. It may be the best thing you can do to prevent heart disease. If you need help quitting, talk to your doctor about stop-smoking programs and medicines. These can increase your chances of quitting for good. · Be active. Get at least 30 minutes of exercise on most days of the week. Walking is a good choice. You also may want to do other activities, such as running, swimming, cycling, or playing tennis or team sports. · Eat heart-healthy foods. Eat more fruits and vegetables and less foods that contain saturated and trans fats. Limit alcohol, sodium, and sweets. · Stay at a healthy weight. Lose weight if you need to. · Manage other health problems such as diabetes, high blood pressure, and high cholesterol. · If you have talked about it with your doctor, take a low-dose aspirin every day. Aspirin can help certain people lower their risk of a heart attack or stroke. But taking aspirin isn't right for everyone, because it can cause serious bleeding. Do not start taking daily aspirin unless your doctor knows about it. How is coronary artery disease treated? · Your doctor will suggest that you make lifestyle changes. For example, your doctor may ask you to eat healthy foods, quit smoking, lose extra weight, and be more active. · You will have to take medicines. · Your doctor may suggest a procedure to open narrowed or blocked arteries. This is called angioplasty. Or your doctor may suggest using healthy blood vessels to create detours around narrowed or blocked arteries. This is called bypass surgery. Follow-up care is a key part of your treatment and safety. Be sure to make and go to all appointments, and call your doctor if you are having problems. It's also a good idea to know your test results and keep a list of the medicines you take.   Where can you learn more?  Go to http://www.gray.com/. Enter [id number] [id number] in the search box to learn more about \"Learning About Coronary Artery Disease (CAD). \"  Current as of: July 22, 2018  Content Version: 12.1  © 0166-1938 [employer]. Care instructions adapted under license by [employer] (which disclaims liability or warranty for this information). If you have questions about a medical condition or this instruction, always ask your healthcare professional. [address] any warranty or liability for your use of this information. Heart Attack: Care Instructions  Your Care Instructions    A heart attack (myocardial infarction, or MI) occurs when one or more of the coronary arteries, which supply the heart with oxygen-rich blood, is blocked. A blockage usually occurs when plaque inside the artery breaks open and a blood clot forms in the artery. After a heart attack, you may be worried about your future. Over the next several weeks, your heart will start to heal. Though it can be hard to break old habits, you can prevent another heart attack by making some lifestyle changes and by taking medicines. You may use this information for ideas about what to do at home to speed your recovery. Follow-up care is a key part of your treatment and safety. Be sure to make and go to all appointments, and call your doctor if you are having problems. It's also a good idea to know your test results and keep a list of the medicines you take. How can you care for yourself at home? Activity    · Until your doctor says it is okay, do not do strenuous exercise. And do not lift, pull, or push anything heavy. Ask your doctor what types of activities are safe for you. · If your doctor has not set you up with a cardiac rehabilitation (rehab) program, talk to him or her about whether that is right for you. Cardiac rehab includes supervised exercise.  It also includes help with diet and lifestyle changes and emotional support. It may reduce your risk of future heart problems. · Increase your activities slowly. Take short rest breaks when you get tired. · If your doctor recommends it, get more exercise. Walking is a good choice. Bit by bit, increase the amount you walk every day. Try for at least 30 minutes on most days of the week. You also may want to swim, bike, or do other activities. Talk with your doctor before you start an exercise program to make sure it is safe for you. · Ask your doctor when you can drive, go back to work, and do other daily activities again. · You can have sex as soon as you feel ready for it. Often this means when you can easily walk around or climb stairs. Talk with your doctor if you have any concerns. If you are taking nitroglycerin, do not take erection-enhancing medicine such as sildenafil (Viagra), tadalafil (Cialis), or vardenafil (Levitra) . Lifestyle changes    · Do not smoke. Smoking increases your risk of another heart attack. If you need help quitting, talk to your doctor about stop-smoking programs and medicines. These can increase your chances of quitting for good. · Eat a heart-healthy diet that is low in saturated fat and salt, and is full of fruits, vegetables and whole-grains. Eat at least two servings of fish each week. You may get more details about how to eat healthy. But these tips can help you get started. · Stay at a healthy weight, or lose weight if you need to. Medicines    · Be safe with medicines. Take your medicines exactly as prescribed. Call your doctor if you think you are having a problem with your medicine. You will get more details on the specific medicines your doctor prescribes. Do not stop taking your medicine unless your doctor tells you to. Not taking your medicine might raise your risk of having another heart attack. · You may need several medicines to help lower your risk of another heart attack. These include:  ? Blood pressure medicines such as angiotensin-converting enzyme (ACE) inhibitors, ARBs (angiotensin II receptor blockers), and beta-blockers. ? Cholesterol medicine called statins. ? Aspirin and other blood thinners. These prevent blood clots that can cause a heart attack. · If your doctor has given you nitroglycerin, keep it with you at all times. If you have angina symptoms, such as chest pain or pressure, sit down and rest. Take the first dose of nitroglycerin as directed. If symptoms get worse or are not getting better within 5 minutes, call 911 right away. Stay on the phone. The emergency  will tell you what to do. · Do not take any over-the-counter medicines, vitamins, or herbal products without talking to your doctor first.    Staying healthy    · Manage other health conditions such as high blood pressure and diabetes. · Avoid colds and flu. Get a pneumococcal vaccine shot. If you have had one before, ask your doctor whether you need another dose. Get the flu vaccine every year. If you must be around people with colds or flu, wash your hands often. · Be sure to tell your doctor about any angina symptoms you have had, even if they went away. Pay attention to your angina symptoms. Know what is typical for you and learn how to control it. Know when to call for help. · Talk to your family, friends, or a counselor about your feelings. It is normal to feel frightened, angry, hopeless, helpless, and even guilty. Talking openly about bad feelings can help you cope. If you have symptoms of depression, talk to your doctor. When should you call for help? Call 911 anytime you think you may need emergency care. For example, call if:    · You have symptoms of a heart attack. These may include:  ? Chest pain or pressure, or a strange feeling in the chest.  ? Sweating. ? Shortness of breath. ? Nausea or vomiting.   ? Pain, pressure, or a strange feeling in the back, neck, jaw, or upper belly or in one or both shoulders or arms. ? Lightheadedness or sudden weakness. ? A fast or irregular heartbeat. After you call 911, the  may tell you to chew 1 adult-strength or 2 to 4 low-dose aspirin. Wait for an ambulance. Do not try to drive yourself. · You have angina symptoms (such as chest pain or pressure) that do not go away with rest or are not getting better within 5 minutes after you take a dose of nitroglycerin. · You passed out (lost consciousness). · You feel like you are having another heart attack. Call your doctor now or seek immediate medical care if:    · You are having angina symptoms, such as chest pain or pressure, more often than usual, or the symptoms are different or worse than usual.     · You have new or increased shortness of breath. · You are dizzy or lightheaded, or you feel like you may faint. Watch closely for changes in your health, and be sure to contact your doctor if you have any problems. Where can you learn more? Go to http://www.gray.com/. Enter [id number] in the search box to learn more about \"Heart Attack: Care Instructions. \"  Current as of: July 22, 2018  Content Version: 12.1  © 5633-2998 [employer]. Care instructions adapted under license by [employer] (which disclaims liability or warranty for this information). If you have questions about a medical condition or this instruction, always ask your healthcare professional. [place] [address] any warranty or liability for your use of this information. Percutaneous Coronary Intervention: What to Expect at [facility]    Percutaneous coronary intervention (PCI) is the name for procedures that are used to open a narrowed or blocked coronary artery. The two most common PCI procedures are coronary angioplasty and coronary stent placement.   Your groin or arm may have a bruise and feel sore for a day or two after a percutaneous coronary intervention (PCI). You can do light activities around the house, but nothing strenuous for several days. This care sheet gives you a general idea about how long it will take for you to recover. But each person recovers at a different pace. Follow the steps below to get better as quickly as possible. How can you care for yourself at home? Activity    · If the doctor gave you a sedative:  ? For 24 hours, don't do anything that requires attention to detail, such as going to work, making important decisions, or signing any legal documents. It takes time for the medicine's effects to completely wear off.  ? For your safety, do not drive or operate any machinery that could be dangerous. Wait until the medicine wears off and you can think clearly and react easily. · Do not do strenuous exercise and do not lift, pull, or push anything heavy until your doctor says it is okay. This may be for a day or two. You can walk around the house and do light activity, such as cooking. · If the catheter was placed in your groin, try not to walk up stairs for the first couple of days. · If the catheter was placed in your arm near your wrist, do not bend your wrist deeply for the first couple of days. Be careful using your hand to get into and out of a chair or bed. · Carry your stent identification card with you at all times. · If your doctor recommends it, get more exercise. Walking is a good choice. Bit by bit, increase the amount you walk every day. Try for at least 30 minutes on most days of the week. Diet    · Drink plenty of fluids to help your body flush out the dye. If you have kidney, heart, or liver disease and have to limit fluids, talk with your doctor before you increase the amount of fluids you drink. · Keep eating a heart-healthy diet that has lots of fruits, vegetables, and whole grains. If you have not been eating this way, talk to your doctor.  You also may want to talk to a dietitian. This expert can help you to learn about healthy foods and plan meals. Medicines    · Your doctor will tell you if and when you can restart your medicines. He or she will also give you instructions about taking any new medicines. · If you take blood thinners, such as warfarin (Coumadin), clopidogrel (Plavix), or aspirin, be sure to talk to your doctor. He or she will tell you if and when to start taking those medicines again. Make sure that you understand exactly what your doctor wants you to do. · Your doctor will prescribe blood-thinning medicines. You will likely take aspirin plus another antiplatelet, such as clopidogrel (Plavix). It is very important that you take these medicines exactly as directed. These medicines help keep the coronary artery open and reduce your risk of a heart attack. · Call your doctor if you think you are having a problem with your medicine. Care of the catheter site    · For 1 or 2 days, keep a bandage over the spot where the catheter was inserted. The bandage probably will fall off in this time. · Put ice or a cold pack on the area for 10 to 20 minutes at a time to help with soreness or swelling. Put a thin cloth between the ice and your skin. · You may shower 24 to 48 hours after the procedure, if your doctor okays it. Pat the incision dry. · Do not soak the catheter site until it is healed. Don't take a bath for 1 week, or until your doctor tells you it is okay. · Watch for bleeding from the site. A small amount of blood (up to the size of a quarter) on the bandage can be normal.     · If you are bleeding, lie down and press on the area for 15 minutes to try to make it stop. If the bleeding does not stop, call your doctor or seek immediate medical care. Follow-up care is a key part of your treatment and safety. Be sure to make and go to all appointments, and call your doctor if you are having problems.  It's also a good idea to know your test results and keep a list of the medicines you take. When should you call for help? Call 911 anytime you think you may need emergency care. For example, call if:    · You passed out (lost consciousness). · You have severe trouble breathing. · You have sudden chest pain and shortness of breath, or you cough up blood. · You have symptoms of a heart attack, such as:  ? Chest pain or pressure. ? Sweating. ? Shortness of breath. ? Nausea or vomiting. ? Pain that spreads from the chest to the neck, jaw, or one or both shoulders or arms. ? Dizziness or lightheadedness. ? A fast or uneven pulse. After calling 911, chew 1 adult-strength aspirin. Wait for an ambulance. Do not try to drive yourself. · You have been diagnosed with angina, and you have angina symptoms that do not go away with rest or are not getting better within 5 minutes after you take one dose of nitroglycerin. Call your doctor now or seek immediate medical care if:    · You are bleeding from the area where the catheter was put in your artery. · You have a fast-growing, painful lump at the catheter site. · You have signs of infection, such as:  ? Increased pain, swelling, warmth, or redness. ? Red streaks leading from the catheter site. ? Pus draining from the catheter site. ? A fever. · Your leg, arm, or hand is painful, looks blue, or feels cold, numb, or tingly. Watch closely for changes in your health, and be sure to contact your doctor if you have any problems. Where can you learn more? Go to http://www.gray.com/. Enter O967 in the search box to learn more about \"Percutaneous Coronary Intervention: What to Expect at Home. \"  Current as of: July 22, 2018  Content Version: 12.1  © 4155-7580 Healthwise, [employer]. Care instructions adapted under license by [employer] (which disclaims liability or warranty for this information).  If you have questions about a medical condition or this instruction, always ask your healthcare professional. [address] any warranty or liability for your use of this information. Patient Education   Patient Education   Patient Education   Patient Education      Nitroglycerin, Rapid Release (NitroMist, Nitrolingual, Nitroquick, Nitrostat) - (By mouth)   Why this medicine is used:   Treats or prevents angina (chest pain). Contact a nurse or doctor right away if you have:  · Throbbing, severe, or ongoing headache, confusion, vision problems  · Trouble breathing, cold sweat, blue skin, lips, or nails  · Severe or ongoing dizziness, lightheadedness, fainting  · Low fever     Common side effects:  · Dizziness, lightheadedness  · Headache  · Feeling of warmth, redness of the face, neck, arms, and upper chest  © 2017 [employer] [facility] Information is for End User's use only and may not be sold, redistributed or otherwise used for commercial purposes. Clopidogrel (Plavix) - (By mouth)   Why this medicine is used:   Helps prevent stroke, heart attack, and other heart problems. Contact a nurse or doctor right away if you have:  · Sudden or severe headache  · Bloody vomit or vomit that looks like coffee grounds; bloody or black, tarry stools  · Bleeding that does not stop or bruises that do not heal  · Dark urine or pale stools, nausea, loss of appetite, stomach pain,  · Yellow skin or eyes     Common side effects:  · Minor bleeding or bruising  © 2017 [employer] [facility] Information is for End User's use only and may not be sold, redistributed or otherwise used for commercial purposes. Atorvastatin (Lipitor) - (By mouth)   Why this medicine is used:   Treats high cholesterol and triglyceride levels. Reduces the risk of angina, stroke, heart attack, or certain heart and blood vessel problems.   Contact a nurse or doctor right away if you have:  · Severe headache, confusion, trouble speaking  · Dark urine or pale stools  · Yellow skin or eyes  · Nausea, vomiting, loss of appetite, stomach pain  · Muscle pain, tenderness, or weakness; unusual tiredness     Common side effects:  · Diarrhea  · Joint pain  © 2017 [facility] Information is for End User's use only and may not be sold, redistributed or otherwise used for commercial purposes. Aspirin (Nabeel Extra Strength, Nabeel Aspirin Children's, Bufferin, Bufferin Low Dose) - (By mouth)   Why this medicine is used:   Treats pain, fever, and inflammation. May also reduce the risk of heart attack. Contact a nurse or doctor right away if you have:  · Bloody vomit or vomit that looks like coffee grounds  · Blood in urine or bloody or black, tarry stools  · Wheezing or trouble breathing     Common side effects:  · Upset stomach  © 2017 [facility] Information is for End User's use only and may not be sold, redistributed or otherwise used for commercial purposes.

## 2021-10-01 NOTE — PROGRESS NOTES
Kvng Dubon. MD Karolyn Ross. Danielle Carlson MD         Consult Note      Gypsy Espanaarner         9/20/2021 1940    REFERRING PHYSICIAN:  Dr. Ubaldo Suarez:  The patient is a [de-identified] y.o. female who is seen for evaluation of aortic stenosis. She recently established care with new PCP who noted a cardiac murmur. Echocardiogram showed critical AS with MARTIN of 0.5cm2, mean gradient of 60mmHg and peak gradient of 96.4mmHg. She was seen in the office by Dr Xiang Fernandez. LHC was planned. She underwent cardiac catheterization today that showed obstructive disease in the LAD. 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 She denies any chest discomfort but her  states she has complained of this. She complains of frequent dizziness. She denies any syncopal episodes. She uses a walker at baseline and has history of multiple falls.      Past Medical History:   Diagnosis Date    Anatomical narrow angle borderline glaucoma 6/10/2015    Blindness of left eye     Chronic angle-closure glaucoma 6/10/2015    COPD     COPD (chronic obstructive pulmonary disease) ([facility] [address] 06/10/2015    managed by family dr Saldana Favorite Cortical, lamellar, or zonular cataract, nonsenile 6/10/2015    Dementia ([address]     Depression     Diabetes ([address]     Diverticulosis     Gastrointestinal disorder     diverticultits x 2 episodes    GERD (gastroesophageal reflux disease) 6/10/2015    Hyperlipemia 6/10/2015    Hypertension 6/10/2015    Memory loss 6/10/2015    Methicillin resistant Staphylococcus aureus in conditions classified elsewhere and of unspecified site     Nearsightedness 6/10/2015    Neuropathy 6/10/2015    Nuclear cataract, nonsenile 6/10/2015    Obesity     Optic atrophy, 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    Osteoarthritis     Osteoarthrosis involving, or with mention of more than one site, but not specified as generalized, site unspecified(715.80) 6/10/2015    Other and combined forms of senile cataract 6/10/2015    Other ill-defined conditions(799.89)     back pain    Pain in limb     Psychiatric disorder     depression    Regular astigmatism 6/10/2015    Severe stage glaucoma 6/10/2015    Type II or unspecified type diabetes mellitus with neurological manifestations, not stated as uncontrolled(250.60) ([facility] [address] 06/10/2015    does not do fbs checks; today 12/8/20 fbs 279; Hgb A1c: 9.3 on 2/8/33    Umbilical hernia     no repair       Past Surgical History:   Procedure Laterality Date    COLONOSCOPY N/A 12/8/2020    COLONOSCOPY/ BMI 29 performed by Kaylin Barron DO at [address] HX APPENDECTOMY      HX CHOLECYSTECTOMY  2000    HX HEMORRHOIDECTOMY  1982    HX HYSTERECTOMY      HX OTHER SURGICAL  1978    benign growth removal from vocal cords    HX OTHER SURGICAL      full dentures r/t tooth extractions    HX OTHER SURGICAL  2001    both eyes optic nerve    HX OTHER SURGICAL  4/2012    lipoma removal    HX OTHER SURGICAL  1978    exploratory    HX OTHER SURGICAL  2/2012    removed blood clot from her anus    HX POLYPECTOMY  1992    HX TONSILLECTOMY         Family History   Problem Relation Age of Onset    Diabetes Mother     Stroke Mother     Hypertension Mother     Diabetes Father     Heart Disease Father     Hypertension Father     Other Maternal Rosaleen Monica tumors, cyst in kidneys    Diabetes Maternal Uncle     Cancer Paternal Aunt         lung    COPD Paternal Aunt     Cancer Paternal Uncle         throat cancer    Heart Disease Maternal Grandfather     Diabetes Maternal Grandfather     Cancer Paternal Grandfather         throat cancer    Other Other         clogged arteries diverticulitis    Breast Cancer Sister 72       Social History     Socioeconomic History    Marital status:      Spouse name: Not on file    Number of children: Not on file    Years of education: Not on file    Highest education level: Not on file   Occupational History    Not on file   Tobacco Use    Smoking status: Former Smoker     Packs/day: 1.00     Years: 30.00     Pack years: 30.00     Quit date: 2000     Years since quittin.6    Smokeless tobacco: Never Used   Vaping Use    Vaping Use: Never assessed   Substance and Sexual Activity    Alcohol use: No    Drug use: No    Sexual activity: Not Currently   Other Topics Concern    Not on file   Social History Narrative    Not on file     Social Determinants of Health     Financial Resource Strain: Low Risk     Difficulty of Paying Living Expenses: Not hard at all   Food Insecurity: No Food Insecurity    Worried About Running Out of Food in the Last Year: Never true    [address] in the Last Year: Never true   Transportation Needs: No Transportation Needs    Lack of Transportation (Medical): No    Lack of Transportation (Non-Medical): No   Physical Activity: Insufficiently Active    Days of Exercise per Week: 4 days    Minutes of Exercise per Session: 30 min   Stress: No Stress Concern Present    Feeling of Stress : Not at all   Social Connections: Socially Integrated    Frequency of Communication with Friends and Family: More than three times a week    Frequency of Social Gatherings with Friends and Family: More than three times a week    Attends [employer] Services: More than 4 times per year   [employer] [address] [employer] or Organizations: Yes    Attends Club or Organization Meetings: More than 4 times per year    Marital Status:    Intimate Partner Violence: Not At Risk    Fear of Current or Ex-Partner: No    Emotionally Abused: No    Physically Abused: No    Sexually Abused: No       Allergies   Allergen Reactions    Demerol [Meperidine] Nausea and Vomiting     Stomach upset    Morphine Nausea and Vomiting     Stomach upset       No current facility-administered medications on file prior to encounter.      Current Outpatient Medications on File Prior to Encounter   Medication Sig Dispense Refill    aspirin 81 mg chewable tablet Take 1 Tablet by mouth daily.  DULoxetine (CYMBALTA) 30 mg capsule Take 1 Capsule by mouth daily. 1 per day for mood 90 Capsule 1    empagliflozin-linagliptin (Glyxambi) 25-5 mg tab 1 per day for diabetes 90 Tablet 3    gabapentin (NEURONTIN) 600 mg tablet 1 tid for neuropathy (Patient taking differently: two (2) times a day. 1 tid for neuropathy) 90 Tablet 2    insulin detemir U-100 (Levemir FlexTouch U-100 Insuln) 100 unit/mL (3 mL) inpn 50 Units by SubCUTAneous route nightly for 90 days. 50 units q pm  Dispense 15 pens  Indications: type 2 diabetes mellitus 45 mL 3    omeprazole (PRILOSEC) 20 mg capsule For heartburn/reflux  TAKE 1 CAPSULE BY MOUTH EVERY DAY 90 Capsule 3    calcium carbonate-vitamin D3 (Caltrate 600 plus D) 600 mg (1,500 mg)-800 unit chew Take 1 Tab by mouth daily. Indications: osteoporosis, a condition of weak bones 90 Tab 3    ProAir HFA 90 mcg/actuation inhaler Take 1 Puff by inhalation three (3) times daily as needed for Wheezing or Shortness of Breath. 8.5 Inhaler 3    insulin lispro (HumaLOG KwikPen Insulin) 200 unit/mL (3 mL) inpn 10 Units by SubCUTAneous route three (3) times daily (with meals). (Patient taking differently: 10 Units by SubCUTAneous route three (3) times daily (with meals).  Indications: type 2 diabetes mellitus) 6 mL 5    glucose blood VI test strips (BLOOD GLUCOSE TEST) strip One touch verio or other if other glucometer dispensed to test 5 times daily 100 Strip 11    OTHER One each mechanical hospital bed to improve respiratory symptoms and avoid falls due to decreased sight 1 Each 0    OTHER One touch verio glucometer  Ok to dispense other if not formulary 1 Each 0    Lancets misc To be used with dispensed glucometer to test 5 times daily 100 Each 11    Blood-Glucose Meter monitoring kit One touch verio glucometer Dispense other if formulary requiresDispense 100 ea lancets/test stripsCall for questions 1 Kit 0    polyethylene glycol (MIRALAX) 17 gram/dose powder Take 17 g by mouth two (2) times a day. Indications: constipation (Patient taking differently: Take 17 g by mouth as needed. Indications: constipation) 510 g 1       REVIEW OF SYSTEMS:  Review of Systems   Constitutional: Negative for chills, fever, malaise/fatigue, weight gain and weight loss. HENT: Negative for ear pain, hearing loss, nosebleeds, sore throat and tinnitus. Eyes: Negative for blurred vision, vision loss in left eye and vision loss in right eye. Cardiovascular: Positive for chest pain. Negative for dyspnea on exertion, leg swelling, near-syncope, orthopnea, palpitations, paroxysmal nocturnal dyspnea and syncope. Respiratory: Negative for cough, hemoptysis, shortness of breath, sputum production and wheezing. Endocrine: Negative for cold intolerance, heat intolerance and polydipsia. Hematologic/Lymphatic: Does not bruise/bleed easily. Skin: Negative for color change and rash. Musculoskeletal: Negative for back pain, joint pain, joint swelling and myalgias. Gastrointestinal: Negative for abdominal pain, constipation, diarrhea, dysphagia, heartburn, hematemesis, melena, nausea and vomiting. Genitourinary: Negative for dysuria, frequency, hematuria and urgency. Neurological: Positive for dizziness. Negative for difficulty with concentration, headaches, light-headedness, numbness, paresthesias, seizures, vertigo and weakness. Psychiatric/Behavioral: Negative for altered mental status and depression.        Physical Exam  Vitals:    09/30/21 2015 09/30/21 2313 10/01/21 0449 10/01/21 0720   BP: 104/63 111/75 103/61 (!) 105/59   Pulse: 67 (!) 57 64 (!) 59   Resp: 17 17 18 16   Temp: 97.9 °F (36.6 °C) 98.5 °F (36.9 °C) 98.2 °F (36.8 °C) 98.2 °F (36.8 °C)   SpO2: 95% 99% 94% 99%   Weight:   155 lb 11.2 oz (70.6 kg)    Height:           Physical Exam:  General: Well Developed, Obese, No Acute Distress  HEENT: Normocephalic, pupils equal and round, no scleral icterus  Neck: supple, no JVD  Chest wall: No deformity  Heart: S1S2 with RRR with grade III/VI SMITA   Lungs: Clear throughout auscultation bilaterally without adventitious sounds  Abd: soft, nontender, nondistended, with good bowel sounds, no pulsatile masses  Ext: warm, no edema, calves supple/nontender, pulses 2+ bilaterally  Skin: warm and dry, no rashes, cyanosis, jaundice, ecchymoses or evidence of skin breakdown  Psychiatric: Normal mood and affect  Neurologic: Alert and oriented X 3, no focal deficit noted    Labs:   Recent Labs     10/01/21  0445 09/30/21  0758    141   K 4.2 4.2   MG 2.4  --    BUN 15 13   CREA 0.91 0.91   * 178*   WBC 4.9 4.8   HGB 11.9 12.9   HCT 35.4* 37.6   * 143*   CHOL 192  --    HDL 48  --    CHHD 4.0  --    LDLC 105*  --    VLDL 39*  --        Lab Results   Component Value Date/Time    Cholesterol, total 192 10/01/2021 04:45 AM    HDL Cholesterol 48 10/01/2021 04:45 AM    LDL, calculated 105 (H) 10/01/2021 04:45 AM    VLDL, calculated 39 (H) 10/01/2021 04:45 AM    Triglyceride 195 (H) 10/01/2021 04:45 AM    CHOL/HDL Ratio 4.0 10/01/2021 04:45 AM       Assessment:     [facility] vs TAVR discussed. She prefers TAVR. She would have difficulty with rehab from [facility].        Frank Jalloh MD

## 2021-10-01 NOTE — DISCHARGE SUMMARY
[facility] Discharge Summary     Patient ID:  Dickson Hernandez  [id number]  40 y.o.  1940    Admit date: 9/30/2021    Discharge date:  10/1/21    Admitting Physician: Emily Ellis MD     Discharge Physician: MEENAKSHI Plaza/Dr. Qian Muñoz    Admission Diagnoses: Nonrheumatic aortic valve stenosis [I35.0]  Post PTCA [Z98.61]    Discharge Diagnoses:    Diagnosis    Post PTCA    Nonrheumatic aortic valve stenosis    At high risk for falls    At moderate risk for fall    Diverticulosis    Type 2 diabetes with nephropathy (HCC)    Murmur, cardiac    Recurrent depression (HCC)    Vitamin D deficiency    Flexor tenosynovitis of finger    Polyneuropathy associated with underlying disease ([address]    Cat bite of finger    Cellulitis of left index finger    Dehydration, moderate    Non compliance w medication regimen    Hypertension    Hyperlipemia    Osteoarthritis of multiple joints    GERD (gastroesophageal reflux disease)    Neuropathy    COPD (chronic obstructive pulmonary disease) (HCC)    Type II diabetes mellitus with neurological manifestations, uncontrolled (HCC)    Memory loss    Regular astigmatism    Severe stage glaucoma    Optic atrophy    Cortical, lamellar, or zonular cataract, nonsenile    Nuclear cataract, nonsenile    Nearsightedness    Chronic angle-closure glaucoma    Other and combined forms of senile cataract    Anatomical narrow angle borderline glaucoma       Cardiology Procedures this admission:  Left heart catheterization with PCI  Consults: None    Hospital Course: Patient was seen at the office of [facility] by Dr. Kat Sands for complaints of severe AS on echo, poor historian due to underlying dementia but had decreased activity tolerance and new FRANKLIN and was subsequently scheduled for a LHC at [facility] on 9/30/21. Patient underwent cardiac catheterization by Dr. Kat Sands.  Patient was found to have a 80% stenosis of the LAD that was stented with overlapping resolute Omar 2.0 x 30 and 2.75 x 26 mm drug-eluting stents with 0% residual stenosis. Patient tolerated the procedure well and was taken to the telemetry floor for recovery. The following morning patient was up feeling well without any complaints of chest pain or shortness of breath. Patient's right radial cath site was clean, dry and intact without hematoma or bruit. Patient's labs were WNL. Patient was seen and examined by Dr. Angle Abdullahi and determined stable and ready for discharge. Patient was instructed on the importance of medication compliance including taking aspirin and plavix everyday without missing a dose. After receiving drug eluting stents, the patient will remain on dual anti-platelet therapy for 1 year. For maximized medical therapy for CAD, patient will start statin, no BB/ACe or ARB due to hypotension. The patient will follow up with [facility] Dr. Ursula Nesbitt 10-11-21 at [address] [place] [address] and has been referred to cardiac rehab. DISPOSITION: The patient is being discharged home in stable condition on a low saturated fat, low cholesterol and low salt diet. The patient is instructed to advance activities as tolerated to the limit of fatigue or shortness of breath. The patient is instructed to avoid all heavy lifting for 5 days. The patient is instructed to watch the cath site for bleeding/oozing; if seen, the patient is instructed to apply firm pressure with a clean cloth and call [facility] at [phone]. The patient is instructed to watch for signs of infection which include: increasing area of redness, fever/hot to touch or purulent drainage at the catheterization site. The patient is instructed not to soak in a bathtub for 7-10 days, but is cleared to shower. The patient is instructed to call the office or return to the ER for immediate evaluation for any shortness of breath or chest pain not relieved by NTG.         Discharge Exam: Visit Vitals  /61 (BP 1 Location: Left upper arm, BP Patient Position: At rest)   Pulse 64   Temp 98.2 °F (36.8 °C)   Resp 18   Ht 5' 2\" (1.575 m)   Wt 70.6 kg (155 lb 11.2 oz)   SpO2 94%   BMI 28.48 kg/m²     Patient has been seen by Dr. Светлана Borges: see his progress note for exam details.     Recent Results (from the past 24 hour(s))   EKG, 12 LEAD, INITIAL    Collection Time: 09/30/21  7:49 AM   Result Value Ref Range    Ventricular Rate 71 BPM    Atrial Rate 70 BPM    QRS Duration 98 ms    Q-T Interval 430 ms    QTC Calculation (Bezet) 467 ms    Calculated R Axis -55 degrees    Calculated T Axis 85 degrees    Diagnosis       Sinus rhythm  Left axis deviation  Voltage criteria for left ventricular hypertrophy  Abnormal ECG    Confirmed by ST DEBI PATEL MD (), SAM STEWART ([id number]) on 9/30/2021 8:26:04 AM     CBC W/O DIFF    Collection Time: 09/30/21  7:58 AM   Result Value Ref Range    WBC 4.8 4.3 - 11.1 K/uL    RBC 4.05 4.05 - 5.2 M/uL    HGB 12.9 11.7 - 15.4 g/dL    HCT 37.6 35.8 - 46.3 %    MCV 92.8 79.6 - 97.8 FL    MCH 31.9 26.1 - 32.9 PG    MCHC 34.3 31.4 - 35.0 g/dL    RDW 12.3 11.9 - 14.6 %    PLATELET 632 (L) 796 - 450 K/uL    MPV 10.3 9.4 - 12.3 FL    ABSOLUTE NRBC 0.00 0.0 - 0.2 K/uL   METABOLIC PANEL, BASIC    Collection Time: 09/30/21  7:58 AM   Result Value Ref Range    Sodium 141 136 - 145 mmol/L    Potassium 4.2 3.5 - 5.1 mmol/L    Chloride 108 (H) 98 - 107 mmol/L    CO2 29 21 - 32 mmol/L    Anion gap 4 (L) 7 - 16 mmol/L    Glucose 178 (H) 65 - 100 mg/dL    BUN 13 8 - 23 MG/DL    Creatinine 0.91 0.6 - 1.0 MG/DL    GFR est AA >60 >60 ml/min/1.73m2    GFR est non-AA >60 >60 ml/min/1.73m2    Calcium 9.4 8.3 - 10.4 MG/DL   POC ACTIVATED CLOTTING TIME    Collection Time: 09/30/21 10:44 AM   Result Value Ref Range    Activated Clotting Time (POC) 577 (H) 70 - 128 SECS   GLUCOSE, POC    Collection Time: 09/30/21 12:48 PM   Result Value Ref Range    Glucose (POC) 132 (H) 65 - 100 mg/dL    Performed by Che    GLUCOSE, POC    Collection Time: 09/30/21  3:39 PM   Result Value Ref Range    Glucose (POC) 170 (H) 65 - 100 mg/dL    Performed by [address], POC    Collection Time: 09/30/21  8:19 PM   Result Value Ref Range    Glucose (POC) 182 (H) 65 - 100 mg/dL    Performed by Angel)HollyPCT    METABOLIC PANEL, BASIC    Collection Time: 10/01/21  4:45 AM   Result Value Ref Range    Sodium 142 136 - 145 mmol/L    Potassium 4.2 3.5 - 5.1 mmol/L    Chloride 108 (H) 98 - 107 mmol/L    CO2 27 21 - 32 mmol/L    Anion gap 7 7 - 16 mmol/L    Glucose 131 (H) 65 - 100 mg/dL    BUN 15 8 - 23 MG/DL    Creatinine 0.91 0.6 - 1.0 MG/DL    GFR est AA >60 >60 ml/min/1.73m2    GFR est non-AA >60 >60 ml/min/1.73m2    Calcium 9.1 8.3 - 10.4 MG/DL   LIPID PANEL    Collection Time: 10/01/21  4:45 AM   Result Value Ref Range    Cholesterol, total 192 <200 MG/DL    Triglyceride 195 (H) 35 - 150 MG/DL    HDL Cholesterol 48 40 - 60 MG/DL    LDL, calculated 105 (H) <100 MG/DL    VLDL, calculated 39 (H) 6.0 - 23.0 MG/DL    CHOL/HDL Ratio 4.0     MAGNESIUM    Collection Time: 10/01/21  4:45 AM   Result Value Ref Range    Magnesium 2.4 1.8 - 2.4 mg/dL   CBC WITH AUTOMATED DIFF    Collection Time: 10/01/21  4:45 AM   Result Value Ref Range    WBC 4.9 4.3 - 11.1 K/uL    RBC 3.88 (L) 4.05 - 5.2 M/uL    HGB 11.9 11.7 - 15.4 g/dL    HCT 35.4 (L) 35.8 - 46.3 %    MCV 91.2 79.6 - 97.8 FL    MCH 30.7 26.1 - 32.9 PG    MCHC 33.6 31.4 - 35.0 g/dL    RDW 12.4 11.9 - 14.6 %    PLATELET 230 (L) 738 - 450 K/uL    MPV 10.4 9.4 - 12.3 FL    ABSOLUTE NRBC 0.00 0.0 - 0.2 K/uL    DF AUTOMATED      NEUTROPHILS 70 43 - 78 %    LYMPHOCYTES 21 13 - 44 %    MONOCYTES 8 4.0 - 12.0 %    EOSINOPHILS 1 0.5 - 7.8 %    BASOPHILS 0 0.0 - 2.0 %    IMMATURE GRANULOCYTES 0 0.0 - 5.0 %    ABS. NEUTROPHILS 3.4 1.7 - 8.2 K/UL    ABS. LYMPHOCYTES 1.0 0.5 - 4.6 K/UL    ABS. MONOCYTES 0.4 0.1 - 1.3 K/UL    ABS.  EOSINOPHILS 0.1 0.0 - 0.8 K/UL ABS. BASOPHILS 0.0 0.0 - 0.2 K/UL    ABS. IMM. GRANS. 0.0 0.0 - 0.5 K/UL         Patient Instructions:   Current Discharge Medication List      START taking these medications    Details   aspirin 81 mg chewable tablet Take 1 Tablet by mouth daily. Start date: 10/1/2021      clopidogreL (PLAVIX) 75 mg tab Take 1 Tablet by mouth daily. Qty: 90 Tablet, Refills: 3  Start date: 10/1/2021      nitroglycerin (NITROSTAT) 0.4 mg SL tablet 1 Tablet by SubLINGual route every five (5) minutes as needed for Chest Pain for up to 3 doses. Up to 3 doses. Qty: 30 Tablet, Refills: 11  Start date: 10/1/2021      atorvastatin (LIPITOR) 10 mg tablet Take 1 Tablet by mouth nightly. Qty: 30 Tablet, Refills: 11  Start date: 10/1/2021         CONTINUE these medications which have NOT CHANGED    Details   hydrOXYzine HCL (ATARAX) 25 mg tablet 1-2 every 8-12hours prn anxiety/sleep  Qty: 60 Tablet, Refills: 1    Associated Diagnoses: Situational anxiety      DULoxetine (CYMBALTA) 30 mg capsule Take 1 Capsule by mouth daily. 1 per day for mood  Qty: 90 Capsule, Refills: 1    Comments: Please have patient call office for follow up and for future refills  Associated Diagnoses: Memory loss; Recurrent depression (HCC)      empagliflozin-linagliptin (Glyxambi) 25-5 mg tab 1 per day for diabetes  Qty: 90 Tablet, Refills: 3    Associated Diagnoses: Type 2 diabetes mellitus with diabetic autonomic neuropathy, with long-term current use of insulin ([facility])      gabapentin (NEURONTIN) 600 mg tablet 1 tid for neuropathy  Qty: 90 Tablet, Refills: 2    Associated Diagnoses: Neuropathy      insulin detemir U-100 (Levemir FlexTouch U-100 Insuln) 100 unit/mL (3 mL) inpn 50 Units by SubCUTAneous route nightly for 90 days.  50 units q pm  Dispense 15 pens  Indications: type 2 diabetes mellitus  Qty: 45 mL, Refills: 3    Associated Diagnoses: Type 2 diabetes mellitus with diabetic autonomic neuropathy, with long-term current use of insulin ([facility])      omeprazole (PRILOSEC) 20 mg capsule For heartburn/reflux  TAKE 1 CAPSULE BY MOUTH EVERY DAY  Qty: 90 Capsule, Refills: 3    Comments: Patient needs to call office to schedule next appt for next refill. Associated Diagnoses: Gastroesophageal reflux disease without esophagitis      calcium carbonate-vitamin D3 (Caltrate 600 plus D) 600 mg (1,500 mg)-800 unit chew Take 1 Tab by mouth daily. Indications: osteoporosis, a condition of weak bones  Qty: 90 Tab, Refills: 3    Associated Diagnoses: Osteopenia, unspecified location      ProAir HFA 90 mcg/actuation inhaler Take 1 Puff by inhalation three (3) times daily as needed for Wheezing or Shortness of Breath. Qty: 8.5 Inhaler, Refills: 3    Associated Diagnoses: Simple chronic bronchitis (HCC)      insulin lispro (HumaLOG KwikPen Insulin) 200 unit/mL (3 mL) inpn 10 Units by SubCUTAneous route three (3) times daily (with meals). Qty: 6 mL, Refills: 5    Associated Diagnoses: Type 2 diabetes mellitus with diabetic autonomic neuropathy, with long-term current use of insulin ([facility])      glucose blood VI test strips (BLOOD GLUCOSE TEST) strip One touch verio or other if other glucometer dispensed to test 5 times daily  Qty: 100 Strip, Refills: 11    Associated Diagnoses: Type 2 diabetes mellitus with diabetic autonomic neuropathy, with long-term current use of insulin ([facility])      !! OTHER One each mechanical hospital bed to improve respiratory symptoms and avoid falls due to decreased sight  Qty: 1 Each, Refills: 0    Associated Diagnoses: Chronic bronchitis, unspecified chronic bronchitis type ([address] Severe stage glaucoma; Optic atrophy; Nuclear cataract, nonsenile; Fall, subsequent encounter;  Facial hematoma, subsequent encounter; Subconjunctival hemorrhage of right eye      !! OTHER One touch verio glucometer  Ok to dispense other if not formulary  Qty: 1 Each, Refills: 0    Associated Diagnoses: Type 2 diabetes mellitus with diabetic autonomic neuropathy, with long-term current use of insulin ([facility]      Lancets misc To be used with dispensed glucometer to test 5 times daily  Qty: 100 Each, Refills: 11    Associated Diagnoses: Type 2 diabetes mellitus with diabetic autonomic neuropathy, with long-term current use of insulin ([facility])      Blood-Glucose Meter monitoring kit One touch verio glucometer Dispense other if formulary requiresDispense 100 ea lancets/test stripsCall for questions  Qty: 1 Kit, Refills: 0    Associated Diagnoses: Type 2 diabetes mellitus with diabetic autonomic neuropathy, with long-term current use of insulin ([facility])      polyethylene glycol (MIRALAX) 17 gram/dose powder Take 17 g by mouth two (2) times a day. Indications: constipation  Qty: 510 g, Refills: 1    Associated Diagnoses: Chronic idiopathic constipation       !! - Potential duplicate medications found. Please discuss with provider.             Signed:  Louisa Soares PA-C  10/1/2021  7:26 AM

## 2021-10-11 PROBLEM — I25.10 CORONARY ARTERY DISEASE INVOLVING NATIVE CORONARY ARTERY OF NATIVE HEART: Status: ACTIVE | Noted: 2021-10-11

## 2021-10-19 ENCOUNTER — TELEPHONE (OUTPATIENT)
Dept: CASE MANAGEMENT | Age: 81
End: 2021-10-19

## 2021-10-19 NOTE — TELEPHONE ENCOUNTER
Preassessment Visit for TAVR    Covid testing location:  [facility]  Covid testing date and time: November 5 @ 10:00am, closed from 12-1 for lunch    Name: Ricky Campa  When: November 8, 2021  Time: Arrive no later than 7:30 am  Where: [address]  Instructions:          Eat breakfast before arrival, make sure your blood sugar is well controlled      Take your morning medications before you arrive      Bring your medications in the bottles or a list with the dosages so we can verify them      Bring someone with you to the visit      Bring your insurance card, drivers license, [address] [place] or [address] [facility] for a 2hour visit  At this appointment you will meet anesthesia and they will tell you the anesthesia plan for your surgery, you will have non fasting labs drawn, chest X-ray completed, EKG, and a breathing test (pulmonary function test) completed. Transcatheter Aortic Valve Replacement (TAVR)   When: November 9, 2021  Arrival time: Someone from preop will call you on Monday, the 8th, around lunch to let you know exactly what time to be at the hospital on Tuesday. Where: On Tuesday, the 9th, you will come to the hospital ([address] at the front entrance ([place] [address]), check in at registration on the left just inside the doors, and they will direct you where to go. Plan to stay at least 1 night, maybe 2. Call Jojo Benavidez for any questions [phone]  Instructions given to pt and emailed to her per her request to the email on file.     Jojo Benavidez, Structural Heart NAvigator

## 2021-11-08 ENCOUNTER — TELEPHONE (OUTPATIENT)
Dept: CASE MANAGEMENT | Age: 81
End: 2021-11-08

## 2021-11-08 ENCOUNTER — HOSPITAL ENCOUNTER (OUTPATIENT)
Dept: SURGERY | Age: 81
Discharge: HOME | End: 2021-11-08

## 2021-11-08 NOTE — TELEPHONE ENCOUNTER
Pt didn't arrive to preassessment this morning and they have tried to reach the pt and spouse and were unable to but did reach the son who said they went to the hospital for preassessment. I had tried to call the spouse last week, bc he takes care of appts for the pt dt pt has slight \"dementia\", but was unable to reach him or the pt and tried calling the spouse today as well but was unable to reach him. I was able to reach the son then and the pt was back at home after confusion about preassessment. The spouse was with the pt and I spoke with him. He states they went to [facility] [address] for preassessment as I had instructed but there they were told to go to the [address] [place] [address] so they came DT and then at  they were told to go back to [address] where they went back to but once again were told to go DT. I was able to figure out that they went to [address] instead of [address] where the preassessment was located. After all this the pt and spouse were upset and stated they would not be coming back for preassessment today and they wanted to reschedule the TAVr appt and preassessment. I instructed them I would follow up with preassessment and see if there was something we could do to help with confusion in the future and I would be in touch with them to get a different date and time for her TAVR. The pt did not go for her COVID testing on Friday either. The spouse did read the email with instructions that were sent as the pt requested and the spouse agreed that he checks his email but missed this.     Desiree, Structural Heart Navigator

## 2021-11-12 ENCOUNTER — TELEPHONE (OUTPATIENT)
Dept: CASE MANAGEMENT | Age: 81
End: 2021-11-12

## 2021-11-12 NOTE — TELEPHONE ENCOUNTER
Pt son is having surgery today and the spouse is with him. I have spoken to the pt bc I am unable to reach the spouse who takes care of all the pt appts. Etc. Pt states she has spoken to the spouse and they agree on having the TAVR on 11/23. I have scheduled this and informed her dt the spouse being busy with her son today I will reach back out to them on Monday.     Jojo Benavidez

## 2021-11-15 ENCOUNTER — TELEPHONE (OUTPATIENT)
Dept: CASE MANAGEMENT | Age: 81
End: 2021-11-15

## 2021-11-15 NOTE — TELEPHONE ENCOUNTER
Spoke to pt spouse, Junie Alatorre, regarding plans for upcoming pre assessment visit and TAVR date, vu.    Pre assessment Visit for TAVR  COVID testing will be 11/19 @10:00am at [address]    When: November 22, 2021  Time: Arrive at 7:30 am  [facility] [address]    Instructions: Eat breakfast before arrival   Take your morning medications before you come    Bring your medications in the bottles so we can see the dosages    Bring someone with you to the visit    Plan for a 2hour visit    At this appointment you will meet anesthesia and they will tell you the anesthesia plan for your surgery, you will have non fasting labs drawn, chest X-ray completed, EKG, and a breathing test (pulmonary function test) completed. Transcatheter Aortic Valve Replacement (TAVR)   When: Tuesday-November 23, 2021  Arrival time: Someone from [facility] will call you on Monday, 22nd, around lunch to let you know exactly what time to be at the hospital on Tuesday. Where: On Tuesday, you will come to the hospital ([address] at the front entrance ([address] [place] [address]), check in at registration on the left just inside the doors, and they will direct you where to go. Plan to stay at least 1 night maybe 2.  Call Jojo Benavidez for any questions [phone]

## 2021-11-22 ENCOUNTER — HOSPITAL ENCOUNTER (OUTPATIENT)
Dept: GENERAL RADIOLOGY | Age: 81
Discharge: HOME | End: 2021-11-22
Attending: PHYSICIAN ASSISTANT

## 2021-11-22 ENCOUNTER — HOSPITAL ENCOUNTER (OUTPATIENT)
Dept: SURGERY | Age: 81
Discharge: HOME | DRG: 267 | End: 2021-11-22
Attending: INTERNAL MEDICINE
Payer: MEDICARE

## 2021-11-22 ENCOUNTER — ANESTHESIA EVENT (OUTPATIENT)
Dept: SURGERY | Age: 81
DRG: 267 | End: 2021-11-22
Payer: MEDICARE

## 2021-11-22 VITALS
WEIGHT: 158.7 LBS | RESPIRATION RATE: 20 BRPM | DIASTOLIC BLOOD PRESSURE: 50 MMHG | TEMPERATURE: 98.9 F | HEIGHT: 61 IN | BODY MASS INDEX: 29.96 KG/M2 | OXYGEN SATURATION: 99 % | HEART RATE: 74 BPM | SYSTOLIC BLOOD PRESSURE: 111 MMHG

## 2021-11-22 LAB
ALBUMIN SERPL-MCNC: 3.4 G/DL (ref 3.2–4.6)
ALBUMIN/GLOB SERPL: 0.8 {RATIO} (ref 1.2–3.5)
ALP SERPL-CCNC: 146 U/L (ref 50–130)
ALT SERPL-CCNC: 15 U/L (ref 12–65)
ANION GAP SERPL CALC-SCNC: 1 MMOL/L (ref 7–16)
APPEARANCE UR: CLEAR
AST SERPL-CCNC: 8 U/L (ref 15–37)
BACTERIA SPEC CULT: ABNORMAL
BILIRUB SERPL-MCNC: 0.4 MG/DL (ref 0.2–1.1)
BILIRUB UR QL: NEGATIVE
BUN SERPL-MCNC: 19 MG/DL (ref 8–23)
CALCIUM SERPL-MCNC: 9.6 MG/DL (ref 8.3–10.4)
CHLORIDE SERPL-SCNC: 104 MMOL/L (ref 98–107)
CO2 SERPL-SCNC: 28 MMOL/L (ref 21–32)
COLOR UR: YELLOW
CREAT SERPL-MCNC: 0.98 MG/DL (ref 0.6–1)
ERYTHROCYTE [DISTWIDTH] IN BLOOD BY AUTOMATED COUNT: 11.7 % (ref 11.9–14.6)
EST. AVERAGE GLUCOSE BLD GHB EST-MCNC: 237 MG/DL
GLOBULIN SER CALC-MCNC: 4.2 G/DL (ref 2.3–3.5)
GLUCOSE SERPL-MCNC: 375 MG/DL (ref 65–100)
GLUCOSE UR STRIP.AUTO-MCNC: >1000 MG/DL
HBA1C MFR BLD: 9.9 % (ref 4.2–6.3)
HCT VFR BLD AUTO: 39 % (ref 35.8–46.3)
HGB BLD-MCNC: 13.7 G/DL (ref 11.7–15.4)
HGB UR QL STRIP: NEGATIVE
HISTORY CHECKED?,CKHIST: NORMAL
INR PPP: 1.1
KETONES UR QL STRIP.AUTO: NEGATIVE MG/DL
LEUKOCYTE ESTERASE UR QL STRIP.AUTO: NEGATIVE
MAGNESIUM SERPL-MCNC: 2.2 MG/DL (ref 1.8–2.4)
MCH RBC QN AUTO: 31.6 PG (ref 26.1–32.9)
MCHC RBC AUTO-ENTMCNC: 35.1 G/DL (ref 31.4–35)
MCV RBC AUTO: 89.9 FL (ref 79.6–97.8)
NITRITE UR QL STRIP.AUTO: NEGATIVE
NRBC # BLD: 0 K/UL (ref 0–0.2)
PH UR STRIP: 6.5 [PH] (ref 5–9)
PLATELET # BLD AUTO: 154 K/UL (ref 150–450)
PMV BLD AUTO: 10.6 FL (ref 9.4–12.3)
POTASSIUM SERPL-SCNC: 4.3 MMOL/L (ref 3.5–5.1)
PROT SERPL-MCNC: 7.6 G/DL (ref 6.3–8.2)
PROT UR STRIP-MCNC: NEGATIVE MG/DL
PROTHROMBIN TIME: 14.2 SEC (ref 12.6–14.5)
RBC # BLD AUTO: 4.34 M/UL (ref 4.05–5.2)
SERVICE CMNT-IMP: ABNORMAL
SODIUM SERPL-SCNC: 133 MMOL/L (ref 136–145)
SP GR UR REFRACTOMETRY: 1.03 (ref 1–1.02)
UROBILINOGEN UR QL STRIP.AUTO: 1 EU/DL (ref 0.2–1)
WBC # BLD AUTO: 5.9 K/UL (ref 4.3–11.1)

## 2021-11-22 PROCEDURE — 86901 BLOOD TYPING SEROLOGIC RH(D): CPT

## 2021-11-22 PROCEDURE — 81003 URINALYSIS AUTO W/O SCOPE: CPT

## 2021-11-22 PROCEDURE — 83735 ASSAY OF MAGNESIUM: CPT

## 2021-11-22 PROCEDURE — 80053 COMPREHEN METABOLIC PANEL: CPT

## 2021-11-22 PROCEDURE — 83036 HEMOGLOBIN GLYCOSYLATED A1C: CPT

## 2021-11-22 PROCEDURE — 71046 X-RAY EXAM CHEST 2 VIEWS: CPT

## 2021-11-22 PROCEDURE — 86920 COMPATIBILITY TEST SPIN: CPT

## 2021-11-22 PROCEDURE — 87086 URINE CULTURE/COLONY COUNT: CPT

## 2021-11-22 PROCEDURE — 87641 MR-STAPH DNA AMP PROBE: CPT

## 2021-11-22 PROCEDURE — 85027 COMPLETE CBC AUTOMATED: CPT

## 2021-11-22 PROCEDURE — 77030027138 HC INCENT SPIROMETER -A

## 2021-11-22 PROCEDURE — 94010 BREATHING CAPACITY TEST: CPT

## 2021-11-22 PROCEDURE — 36415 COLL VENOUS BLD VENIPUNCTURE: CPT

## 2021-11-22 PROCEDURE — 85610 PROTHROMBIN TIME: CPT

## 2021-11-22 RX ORDER — OXYCODONE HYDROCHLORIDE 5 MG/1
10 TABLET ORAL
Status: CANCELLED | OUTPATIENT
Start: 2021-11-22 | End: 2021-11-23

## 2021-11-22 RX ORDER — SODIUM CHLORIDE, SODIUM LACTATE, POTASSIUM CHLORIDE, CALCIUM CHLORIDE 600; 310; 30; 20 MG/100ML; MG/100ML; MG/100ML; MG/100ML
100 INJECTION, SOLUTION INTRAVENOUS CONTINUOUS
Status: CANCELLED | OUTPATIENT
Start: 2021-11-22 | End: 2021-11-22

## 2021-11-22 RX ORDER — HYDROMORPHONE HYDROCHLORIDE 2 MG/ML
0.5 INJECTION, SOLUTION INTRAMUSCULAR; INTRAVENOUS; SUBCUTANEOUS
Status: CANCELLED | OUTPATIENT
Start: 2021-11-22

## 2021-11-22 NOTE — PERIOP NOTES
Recent Results (from the past 12 hour(s))   HEMOGLOBIN A1C WITH EAG    Collection Time: 11/22/21  8:22 AM   Result Value Ref Range    Hemoglobin A1c 9.9 (H) 4.20 - 6.30 %    Est. average glucose 237 mg/dL   MAGNESIUM    Collection Time: 11/22/21  8:22 AM   Result Value Ref Range    Magnesium 2.2 1.8 - 2.4 mg/dL   URINALYSIS W/ RFLX MICROSCOPIC    Collection Time: 11/22/21  8:22 AM   Result Value Ref Range    Color YELLOW      Appearance CLEAR      Specific gravity 1.031 (H) 1.001 - 1.023      pH (UA) 6.5 5.0 - 9.0      Protein Negative NEG mg/dL    Glucose >1,000 mg/dL    Ketone Negative NEG mg/dL    Bilirubin Negative NEG      Blood Negative NEG      Urobilinogen 1.0 0.2 - 1.0 EU/dL    Nitrites Negative NEG      Leukocyte Esterase Negative NEG     CBC W/O DIFF    Collection Time: 11/22/21  8:22 AM   Result Value Ref Range    WBC 5.9 4.3 - 11.1 K/uL    RBC 4.34 4.05 - 5.2 M/uL    HGB 13.7 11.7 - 15.4 g/dL    HCT 39.0 35.8 - 46.3 %    MCV 89.9 79.6 - 97.8 FL    MCH 31.6 26.1 - 32.9 PG    MCHC 35.1 (H) 31.4 - 35.0 g/dL    RDW 11.7 (L) 11.9 - 14.6 %    PLATELET 272 124 - 681 K/uL    MPV 10.6 9.4 - 12.3 FL    ABSOLUTE NRBC 0.00 0.0 - 0.2 K/uL   METABOLIC PANEL, COMPREHENSIVE    Collection Time: 11/22/21  8:22 AM   Result Value Ref Range    Sodium 133 (L) 136 - 145 mmol/L    Potassium 4.3 3.5 - 5.1 mmol/L    Chloride 104 98 - 107 mmol/L    CO2 28 21 - 32 mmol/L    Anion gap 1 (L) 7 - 16 mmol/L    Glucose 375 (H) 65 - 100 mg/dL    BUN 19 8 - 23 MG/DL    Creatinine 0.98 0.6 - 1.0 MG/DL    GFR est AA >60 >60 ml/min/1.73m2    GFR est non-AA 58 (L) >60 ml/min/1.73m2    Calcium 9.6 8.3 - 10.4 MG/DL    Bilirubin, total 0.4 0.2 - 1.1 MG/DL    ALT (SGPT) 15 12 - 65 U/L    AST (SGOT) 8 (L) 15 - 37 U/L    Alk.  phosphatase 146 (H) 50 - 130 U/L    Protein, total 7.6 6.3 - 8.2 g/dL    Albumin 3.4 3.2 - 4.6 g/dL    Globulin 4.2 (H) 2.3 - 3.5 g/dL    A-G Ratio 0.8 (L) 1.2 - 3.5     PROTHROMBIN TIME + INR    Collection Time: 11/22/21 8:22 AM   Result Value Ref Range    Prothrombin time 14.2 12.6 - 14.5 sec    INR 1.1       CXR Results  (Last 48 hours)               11/22/21 1013  XR CHEST PA LAT Final result    Impression:  No acute cardiac pulmonary disease. Coronary artery disease. Narrative:  EXAM: XR CHEST PA LAT       INDICATION: Preop clearance       COMPARISON: None. FINDINGS: PA and lateral radiographs of the chest demonstrate clear lungs. The   cardiac and mediastinal contours and pulmonary vascularity are normal. Coronary   artery disease. Bones and soft tissues of the chest wall are unremarkable. .                Patient glucose = 375 on CMP but she had not taken any insulin this morning by her statement and her husbands statement during PAT. Patient taking 20 units of glargine AM and PM. HgbA1C = 9.9; last Hgb A1C was taken 2/4/2020 and = 9.3. Other labs resulted reviewed and forwarding to surgeon.

## 2021-11-22 NOTE — PERIOP NOTES
Patient verified name and . Patient and patient  Tonya Allen provided medical/health information and PTA medications to the best of their ability. TYPE  CASE: 3 TAVR  Order for consent found in EHR and matches case posting. Labs per surgeon: CBC w/ diff, HgbA1C, Magnesium, CMP, MRSA/MSSA, PT/INR, UA, UC, Type and screen for blood bank bracelet drawn by [facility] in [facility] outpatient lab and bracelet placed on patient right arm by [facility] - patient given instructions to not remove and patient  given instructions to not remove BB bracelet - pt  Tonya Allen verbalizes understanding of instructions     Labs per anesthesia protocol: none per protocol or covered with above ordered labs. EKG:  Done 2021 results: SR, left axis deviation, voltage criteria for LVH. No new EKG required per MEENAKSHI Araiza     Patient to remain on plavix per TAVR navigator Lynn Samayoa RN    A PCR COVID swab was performed prior to PFT on 2021 and was negative. Patient has had the [employer] and [employer] vaccine given on 2021. As of this date she has not received her booster. Patient provided with and instructed on educational handouts including Heart Guide to Surgery, blood transfusions, pain management, central line infection prevention, being on a ventilator and hand hygiene for the family and community, and [facility] brochure. Instructed that family must be present in building at all times. Incentive spirometry completed with return demonstration. FEV1 completed as ordered. Patient viewed pre-operative DVD. Instructed on chest-xray procedure. Instructed on type and cross match procedure and not to remove green blood bank bracelet. Surgical skin cleanser provided and instructions given per hospital policy. Original medication prescription bottles visualized during patient appointment.      Patient and patient  Tonya Allen teach back successful and patient demonstrates knowledge of instruction.

## 2021-11-22 NOTE — PERIOP NOTES
Called to patient to let her know that sugar was 375 per patient bloodwork today and to have the patient really watch her sugar today. Patient verbalizes understanding.

## 2021-11-22 NOTE — PERIOP NOTES
PLEASE CONTINUE TAKING ALL PRESCRIPTION MEDICATIONS UP TO THE DAY OF SURGERY UNLESS OTHERWISE DIRECTED BELOW. DISCONTINUE all vitamins and supplements 7 days prior to surgery. DISCONTINUE Non-Steriodal Anti-Inflammatory (NSAIDS) such as Advil and Aleve 5 days prior to surgery. Home Medications to take  the day of surgery   Aspirin 81mg  Duloxetine/cymbalta  Gabapentin/neurontin  Hydroxyzine/atarax  Linaclotide/linzess  Memantine/namenda  Omeprazole/prilosec    Insulin dosage: when pt gets home use the 20 units for morning dose that was skipped; PM DOSE Monday 11/22/2021= 16 units    AM DOSE Tuesday 11/23/2021 = 16 units       Monday 11/22/2021: On the day before surgery please take Acetaminophen 1000mg before bed. You may substitute for Tylenol 650 mg. Home Medications   to Hold   DISCONTINUE all vitamins and supplements 7 days prior to surgery. DISCONTINUE Non-Steriodal Anti-Inflammatory (NSAIDS) such as Advil and Aleve 5 days prior to surgery. Do not take empagliflozin/linagliptin/glyxambi day of surgery          Comments    You are going to have a blood bank bracelet put on by lab. DO NOT REMOVE FOR ANY REASON. May shower with this bracelet on. Please do not bring home medications with you on the day of surgery unless otherwise directed by your nurse. If you are instructed to bring home medications, please give them to your nurse as they will be administered by the nursing staff. If you have any questions, please call [facility] [phone] or [facility] [phone]. A copy of this note was provided to the patient for reference.

## 2021-11-22 NOTE — PERIOP NOTES
Per MEENAKSHI Noel via text since patient has had EKG on 9/30/2021 and it is not abnormal per anesthesia protocol, EKG does not need to be done.

## 2021-11-23 ENCOUNTER — ANESTHESIA (OUTPATIENT)
Dept: SURGERY | Age: 81
DRG: 267 | End: 2021-11-23
Payer: MEDICARE

## 2021-11-23 ENCOUNTER — APPOINTMENT (OUTPATIENT)
Dept: NON INVASIVE DIAGNOSTICS | Age: 81
DRG: 267 | End: 2021-11-23
Attending: INTERNAL MEDICINE
Payer: MEDICARE

## 2021-11-23 ENCOUNTER — HOSPITAL ENCOUNTER (INPATIENT)
Age: 81
LOS: 2 days | Discharge: HOME | DRG: 267 | End: 2021-11-25
Attending: INTERNAL MEDICINE | Admitting: INTERNAL MEDICINE
Payer: MEDICARE

## 2021-11-23 DIAGNOSIS — I35.0 AORTIC VALVE STENOSIS, ETIOLOGY OF CARDIAC VALVE DISEASE UNSPECIFIED: ICD-10-CM

## 2021-11-23 DIAGNOSIS — I35.0 NONRHEUMATIC AORTIC VALVE STENOSIS: ICD-10-CM

## 2021-11-23 DIAGNOSIS — I10 PRIMARY HYPERTENSION: ICD-10-CM

## 2021-11-23 DIAGNOSIS — Z95.3 S/P TAVR (TRANSCATHETER AORTIC VALVE REPLACEMENT), BIOPROSTHETIC: ICD-10-CM

## 2021-11-23 DIAGNOSIS — E78.2 MIXED HYPERLIPIDEMIA: ICD-10-CM

## 2021-11-23 LAB
ABO + RH BLD: NORMAL
ACT AVERAGE - AAVG: 277 SEC
ATRIAL RATE: 73 BPM
BASE EXCESS BLD CALC-SCNC: 0.1 MMOL/L
BASE EXCESS BLD CALC-SCNC: 0.5 MMOL/L
BASELINE ACT - BAACT: 150 SEC
BASELINE ACT - BAACT: 150 SEC
BLD PROD TYP BPU: NORMAL
BLOOD GROUP ANTIBODIES SERPL: NORMAL
BNP SERPL-MCNC: 597 PG/ML
BPU ID: NORMAL
CA-I BLD-MCNC: 1.26 MMOL/L (ref 1.12–1.32)
CA-I BLD-MCNC: 1.29 MMOL/L (ref 1.12–1.32)
CALCULATED P AXIS, ECG09: 54 DEGREES
CALCULATED R AXIS, ECG10: -56 DEGREES
CALCULATED T AXIS, ECG11: 119 DEGREES
CO2 BLD-SCNC: 28 MMOL/L (ref 13–23)
CO2 BLD-SCNC: 29 MMOL/L (ref 13–23)
CROSSMATCH RESULT,%XM: NORMAL
DIAGNOSIS, 93000: NORMAL
ECHO AO ROOT DIAM: 2.97 CM
ECHO AV AREA PEAK VELOCITY: 1.41 CM2
ECHO AV AREA PEAK VELOCITY: 1.48 CM2
ECHO AV AREA VTI: 1.74 CM2
ECHO AV AREA VTI: 1.74 CM2
ECHO AV MEAN GRADIENT: 12.06 MMHG
ECHO AV PEAK GRADIENT: 25.68 MMHG
ECHO AV PEAK VELOCITY: 253.35 CM/S
ECHO AV VTI: 47.11 CM
ECHO LVOT DIAM: 1.88 CM
ECHO LVOT PEAK GRADIENT: 6.62 MMHG
ECHO LVOT PEAK VELOCITY: 128.63 CM/S
ECHO LVOT SV: 82 ML
ECHO LVOT VTI: 29.43 CM
GLUCOSE BLD STRIP.AUTO-MCNC: 218 MG/DL (ref 65–100)
GLUCOSE BLD STRIP.AUTO-MCNC: 226 MG/DL (ref 65–100)
GLUCOSE BLD STRIP.AUTO-MCNC: 252 MG/DL (ref 65–100)
GLUCOSE BLD STRIP.AUTO-MCNC: 267 MG/DL (ref 65–100)
GLUCOSE BLD STRIP.AUTO-MCNC: 371 MG/DL (ref 65–100)
HCO3 BLD-SCNC: 27 MMOL/L (ref 22–26)
HCO3 BLD-SCNC: 28.2 MMOL/L (ref 22–26)
HEPARIN BOLUS-PATIENT - HBPAT: NORMAL UNITS
HEPARIN BOLUS-PATIENT - HBPAT: NORMAL UNITS
HEPARIN BOLUS-PUMP - HBPUMP: 0 UNITS
HEPARIN BOLUS-PUMP - HBPUMP: 0 UNITS
HEPARIN BOLUS-TOTAL - HBTOT: NORMAL UNITS
HEPARIN BOLUS-TOTAL - HBTOT: NORMAL UNITS
HEPARIN TEST CONCENTRATE - HEPTC: 2.5 MG/KG
HISTORY CHECKED?,CKHIST: NORMAL
LVOT MG: 3.99 MMHG
P-R INTERVAL, ECG05: 194 MS
PCO2 BLD: 50.5 MMHG (ref 35–45)
PCO2 BLD: 62.1 MMHG (ref 35–45)
PH BLD: 7.27 [PH] (ref 7.35–7.45)
PH BLD: 7.34 [PH] (ref 7.35–7.45)
PO2 BLD: 183 MMHG (ref 75–100)
PO2 BLD: 223 MMHG (ref 75–100)
POTASSIUM BLD-SCNC: 4.3 MMOL/L (ref 3.5–5.1)
POTASSIUM BLD-SCNC: 4.3 MMOL/L (ref 3.5–5.1)
PROJECTED HEPARIN CONC - PHEP: 1.9 MG/KG
PROJECTED HEPARIN CONC - PHEP: 1.9 MG/KG
Q-T INTERVAL, ECG07: 474 MS
QRS DURATION, ECG06: 120 MS
QTC CALCULATION (BEZET), ECG08: 522 MS
SAO2 % BLD: 100 %
SAO2 % BLD: 99 %
SERVICE CMNT-IMP: ABNORMAL
SLOPE: 126
SLOPE: 126
SODIUM BLD-SCNC: 143 MMOL/L (ref 136–145)
SODIUM BLD-SCNC: 144 MMOL/L (ref 136–145)
SPECIMEN EXP DATE BLD: NORMAL
SPECIMEN SITE: ABNORMAL
SPECIMEN SITE: ABNORMAL
STATUS OF UNIT,%ST: NORMAL
UNIT DIVISION, %UDIV: 0
VENTRICULAR RATE, ECG03: 73 BPM

## 2021-11-23 PROCEDURE — 74011636637 HC RX REV CODE- 636/637: Performed by: PHYSICIAN ASSISTANT

## 2021-11-23 PROCEDURE — 74011250636 HC RX REV CODE- 250/636: Performed by: PHYSICIAN ASSISTANT

## 2021-11-23 PROCEDURE — 74011000250 HC RX REV CODE- 250: Performed by: NURSE ANESTHETIST, CERTIFIED REGISTERED

## 2021-11-23 PROCEDURE — C1884 EMBOLIZATION PROTECT SYST: HCPCS | Performed by: INTERNAL MEDICINE

## 2021-11-23 PROCEDURE — 82330 ASSAY OF CALCIUM: CPT

## 2021-11-23 PROCEDURE — 74011000250 HC RX REV CODE- 250: Performed by: INTERNAL MEDICINE

## 2021-11-23 PROCEDURE — 74011250636 HC RX REV CODE- 250/636: Performed by: INTERNAL MEDICINE

## 2021-11-23 PROCEDURE — 74011250637 HC RX REV CODE- 250/637: Performed by: PHYSICIAN ASSISTANT

## 2021-11-23 PROCEDURE — C1894 INTRO/SHEATH, NON-LASER: HCPCS | Performed by: INTERNAL MEDICINE

## 2021-11-23 PROCEDURE — 77030018548 HC SUT ETHBND2 J&J -B: Performed by: INTERNAL MEDICINE

## 2021-11-23 PROCEDURE — 82803 BLOOD GASES ANY COMBINATION: CPT

## 2021-11-23 PROCEDURE — 82947 ASSAY GLUCOSE BLOOD QUANT: CPT

## 2021-11-23 PROCEDURE — 86901 BLOOD TYPING SEROLOGIC RH(D): CPT

## 2021-11-23 PROCEDURE — 74011250636 HC RX REV CODE- 250/636: Performed by: NURSE ANESTHETIST, CERTIFIED REGISTERED

## 2021-11-23 PROCEDURE — C1760 CLOSURE DEV, VASC: HCPCS | Performed by: INTERNAL MEDICINE

## 2021-11-23 PROCEDURE — 02RF38Z REPLACEMENT OF AORTIC VALVE WITH ZOOPLASTIC TISSUE, PERCUTANEOUS APPROACH: ICD-10-PCS | Performed by: THORACIC SURGERY (CARDIOTHORACIC VASCULAR SURGERY)

## 2021-11-23 PROCEDURE — 2709999900 HC NON-CHARGEABLE SUPPLY

## 2021-11-23 PROCEDURE — C1725 CATH, TRANSLUMIN NON-LASER: HCPCS | Performed by: INTERNAL MEDICINE

## 2021-11-23 PROCEDURE — C1769 GUIDE WIRE: HCPCS | Performed by: INTERNAL MEDICINE

## 2021-11-23 PROCEDURE — 77030019905 HC CATH URETH INTMIT MDII -A: Performed by: INTERNAL MEDICINE

## 2021-11-23 PROCEDURE — 33361 REPLACE AORTIC VALVE PERQ: CPT | Performed by: THORACIC SURGERY (CARDIOTHORACIC VASCULAR SURGERY)

## 2021-11-23 PROCEDURE — 76010000153 HC OR TIME 1.5 TO 2 HR: Performed by: INTERNAL MEDICINE

## 2021-11-23 PROCEDURE — 85520 HEPARIN ASSAY: CPT

## 2021-11-23 PROCEDURE — 85347 COAGULATION TIME ACTIVATED: CPT

## 2021-11-23 PROCEDURE — 82962 GLUCOSE BLOOD TEST: CPT

## 2021-11-23 PROCEDURE — 77030042317 HC BND COMPR HEMSTAT -B: Performed by: INTERNAL MEDICINE

## 2021-11-23 PROCEDURE — 74011000636 HC RX REV CODE- 636: Performed by: INTERNAL MEDICINE

## 2021-11-23 PROCEDURE — 93005 ELECTROCARDIOGRAM TRACING: CPT | Performed by: INTERNAL MEDICINE

## 2021-11-23 PROCEDURE — 77030022513 HC GD NDL ACUSIT CIVC -B: Performed by: INTERNAL MEDICINE

## 2021-11-23 PROCEDURE — 77030013794 HC KT TRNSDUC BLD EDWD -B: Performed by: NURSE ANESTHETIST, CERTIFIED REGISTERED

## 2021-11-23 PROCEDURE — 77030040922 HC BLNKT HYPOTHRM STRY -A: Performed by: NURSE ANESTHETIST, CERTIFIED REGISTERED

## 2021-11-23 PROCEDURE — 77030005401 HC CATH RAD ARRO -A: Performed by: NURSE ANESTHETIST, CERTIFIED REGISTERED

## 2021-11-23 PROCEDURE — 77030016699 HC CATH ANGI DX INFN1 CARD -A: Performed by: INTERNAL MEDICINE

## 2021-11-23 PROCEDURE — 77030031139 HC SUT VCRL2 J&J -A: Performed by: INTERNAL MEDICINE

## 2021-11-23 PROCEDURE — 77030041292 HC SYS DEL CATH EVOLUT PP MEDT -H1: Performed by: INTERNAL MEDICINE

## 2021-11-23 PROCEDURE — 77030005318 HC CATH ELECTRD PACE TMP BARD -C: Performed by: INTERNAL MEDICINE

## 2021-11-23 PROCEDURE — 74011250636 HC RX REV CODE- 250/636: Performed by: ANESTHESIOLOGY

## 2021-11-23 PROCEDURE — 33361 REPLACE AORTIC VALVE PERQ: CPT | Performed by: INTERNAL MEDICINE

## 2021-11-23 PROCEDURE — 74011000258 HC RX REV CODE- 258: Performed by: NURSE ANESTHETIST, CERTIFIED REGISTERED

## 2021-11-23 PROCEDURE — 77030004558 HC CATH ANGI DX SUPR TORQ CARD -A: Performed by: INTERNAL MEDICINE

## 2021-11-23 PROCEDURE — 77030041297 HC SYS LOAD EVOLUT PP MEDT -E: Performed by: INTERNAL MEDICINE

## 2021-11-23 PROCEDURE — 77030016564 HC BLD STRNL SAW4 CNMD -B: Performed by: INTERNAL MEDICINE

## 2021-11-23 PROCEDURE — 77030002986 HC SUT PROL J&J -A: Performed by: INTERNAL MEDICINE

## 2021-11-23 PROCEDURE — 77030041294 HC VLV AORT BIOPROS EVOLUT PP2 MEDT -L: Performed by: INTERNAL MEDICINE

## 2021-11-23 PROCEDURE — 77030038269 HC DRN EXT URIN PURWCK BARD -A

## 2021-11-23 PROCEDURE — 77030005521 HC CATH URETH FOL38 BARD -B: Performed by: INTERNAL MEDICINE

## 2021-11-23 PROCEDURE — 86923 COMPATIBILITY TEST ELECTRIC: CPT

## 2021-11-23 PROCEDURE — 93321 DOPPLER ECHO F-UP/LMTD STD: CPT

## 2021-11-23 PROCEDURE — 77030040393 HC DRSG OPTIFOAM GENT MDII -B

## 2021-11-23 PROCEDURE — 83880 ASSAY OF NATRIURETIC PEPTIDE: CPT

## 2021-11-23 PROCEDURE — 76060000035 HC ANESTHESIA 2 TO 2.5 HR: Performed by: INTERNAL MEDICINE

## 2021-11-23 PROCEDURE — 77030020407 HC IV BLD WRMR ST 3M -A: Performed by: NURSE ANESTHETIST, CERTIFIED REGISTERED

## 2021-11-23 PROCEDURE — 77030013292 HC BOWL MX PRSM J&J -A: Performed by: NURSE ANESTHETIST, CERTIFIED REGISTERED

## 2021-11-23 PROCEDURE — 2709999900 HC NON-CHARGEABLE SUPPLY: Performed by: INTERNAL MEDICINE

## 2021-11-23 PROCEDURE — 65610000006 HC RM INTENSIVE CARE

## 2021-11-23 DEVICE — VLV EVPROPLUS-26 COMM US
Type: IMPLANTABLE DEVICE | Site: AORTA | Status: FUNCTIONAL
Brand: EVOLUT™ PRO+

## 2021-11-23 DEVICE — ANGIO-SEAL VIP VASCULAR CLOSURE DEVICE
Type: IMPLANTABLE DEVICE | Site: GROIN | Status: FUNCTIONAL
Brand: ANGIO-SEAL

## 2021-11-23 RX ORDER — INSULIN LISPRO 100 [IU]/ML
INJECTION, SOLUTION INTRAVENOUS; SUBCUTANEOUS
Status: DISCONTINUED | OUTPATIENT
Start: 2021-11-23 | End: 2021-11-25 | Stop reason: HOSPADM

## 2021-11-23 RX ORDER — GABAPENTIN 300 MG/1
600 CAPSULE ORAL 2 TIMES DAILY
Status: DISCONTINUED | OUTPATIENT
Start: 2021-11-23 | End: 2021-11-25 | Stop reason: HOSPADM

## 2021-11-23 RX ORDER — HYDROCORTISONE SODIUM SUCCINATE 100 MG/2ML
INJECTION, POWDER, FOR SOLUTION INTRAMUSCULAR; INTRAVENOUS AS NEEDED
Status: DISCONTINUED | OUTPATIENT
Start: 2021-11-23 | End: 2021-11-23 | Stop reason: HOSPADM

## 2021-11-23 RX ORDER — SODIUM CHLORIDE, SODIUM LACTATE, POTASSIUM CHLORIDE, CALCIUM CHLORIDE 600; 310; 30; 20 MG/100ML; MG/100ML; MG/100ML; MG/100ML
INJECTION, SOLUTION INTRAVENOUS
Status: DISCONTINUED | OUTPATIENT
Start: 2021-11-23 | End: 2021-11-23 | Stop reason: HOSPADM

## 2021-11-23 RX ORDER — CEFAZOLIN SODIUM IN 0.9 % NACL 2 G/50 ML
2 INTRAVENOUS SOLUTION, PIGGYBACK (ML) INTRAVENOUS EVERY 8 HOURS
Status: DISCONTINUED | OUTPATIENT
Start: 2021-11-23 | End: 2021-11-23

## 2021-11-23 RX ORDER — LIDOCAINE HYDROCHLORIDE 10 MG/ML
INJECTION, SOLUTION EPIDURAL; INFILTRATION; INTRACAUDAL; PERINEURAL AS NEEDED
Status: DISCONTINUED | OUTPATIENT
Start: 2021-11-23 | End: 2021-11-23 | Stop reason: HOSPADM

## 2021-11-23 RX ORDER — INSULIN GLARGINE 100 [IU]/ML
40 INJECTION, SOLUTION SUBCUTANEOUS
Status: DISCONTINUED | OUTPATIENT
Start: 2021-11-23 | End: 2021-11-25 | Stop reason: HOSPADM

## 2021-11-23 RX ORDER — PROPOFOL 10 MG/ML
INJECTION, EMULSION INTRAVENOUS
Status: DISCONTINUED | OUTPATIENT
Start: 2021-11-23 | End: 2021-11-23 | Stop reason: HOSPADM

## 2021-11-23 RX ORDER — SODIUM CHLORIDE 9 MG/ML
75 INJECTION, SOLUTION INTRAVENOUS CONTINUOUS
Status: DISCONTINUED | OUTPATIENT
Start: 2021-11-23 | End: 2021-11-24

## 2021-11-23 RX ORDER — HYDROCODONE BITARTRATE AND ACETAMINOPHEN 5; 325 MG/1; MG/1
1 TABLET ORAL
Status: DISCONTINUED | OUTPATIENT
Start: 2021-11-23 | End: 2021-11-25 | Stop reason: HOSPADM

## 2021-11-23 RX ORDER — GUAIFENESIN 100 MG/5ML
81 LIQUID (ML) ORAL DAILY
Status: DISCONTINUED | OUTPATIENT
Start: 2021-11-24 | End: 2021-11-25 | Stop reason: HOSPADM

## 2021-11-23 RX ORDER — ATORVASTATIN CALCIUM 10 MG/1
10 TABLET, FILM COATED ORAL
Status: DISCONTINUED | OUTPATIENT
Start: 2021-11-23 | End: 2021-11-25 | Stop reason: HOSPADM

## 2021-11-23 RX ORDER — LIDOCAINE HYDROCHLORIDE 10 MG/ML
0.3 INJECTION INFILTRATION; PERINEURAL ONCE
Status: DISCONTINUED | OUTPATIENT
Start: 2021-11-23 | End: 2021-11-23 | Stop reason: HOSPADM

## 2021-11-23 RX ORDER — ONDANSETRON 2 MG/ML
4 INJECTION INTRAMUSCULAR; INTRAVENOUS
Status: DISCONTINUED | OUTPATIENT
Start: 2021-11-23 | End: 2021-11-25 | Stop reason: HOSPADM

## 2021-11-23 RX ORDER — CEFAZOLIN SODIUM/WATER 2 G/20 ML
2 SYRINGE (ML) INTRAVENOUS ONCE
Status: COMPLETED | OUTPATIENT
Start: 2021-11-23 | End: 2021-11-23

## 2021-11-23 RX ORDER — MEMANTINE HYDROCHLORIDE 5 MG/1
10 TABLET ORAL 2 TIMES DAILY
Status: DISCONTINUED | OUTPATIENT
Start: 2021-11-23 | End: 2021-11-25 | Stop reason: HOSPADM

## 2021-11-23 RX ORDER — PROTAMINE SULFATE 10 MG/ML
INJECTION, SOLUTION INTRAVENOUS AS NEEDED
Status: DISCONTINUED | OUTPATIENT
Start: 2021-11-23 | End: 2021-11-23 | Stop reason: HOSPADM

## 2021-11-23 RX ORDER — NICARDIPINE HYDROCHLORIDE 0.1 MG/ML
INJECTION INTRAVENOUS AS NEEDED
Status: DISCONTINUED | OUTPATIENT
Start: 2021-11-23 | End: 2021-11-23 | Stop reason: HOSPADM

## 2021-11-23 RX ORDER — HYDROXYZINE 25 MG/1
25 TABLET, FILM COATED ORAL
Status: DISCONTINUED | OUTPATIENT
Start: 2021-11-23 | End: 2021-11-25 | Stop reason: HOSPADM

## 2021-11-23 RX ORDER — CEFAZOLIN SODIUM/WATER 2 G/20 ML
2 SYRINGE (ML) INTRAVENOUS EVERY 8 HOURS
Status: COMPLETED | OUTPATIENT
Start: 2021-11-23 | End: 2021-11-24

## 2021-11-23 RX ORDER — ACETAMINOPHEN 325 MG/1
650 TABLET ORAL
Status: DISCONTINUED | OUTPATIENT
Start: 2021-11-23 | End: 2021-11-25 | Stop reason: HOSPADM

## 2021-11-23 RX ORDER — FENTANYL CITRATE 50 UG/ML
INJECTION, SOLUTION INTRAMUSCULAR; INTRAVENOUS AS NEEDED
Status: DISCONTINUED | OUTPATIENT
Start: 2021-11-23 | End: 2021-11-23 | Stop reason: HOSPADM

## 2021-11-23 RX ORDER — CLOPIDOGREL BISULFATE 75 MG/1
75 TABLET ORAL DAILY
Status: DISCONTINUED | OUTPATIENT
Start: 2021-11-24 | End: 2021-11-25 | Stop reason: HOSPADM

## 2021-11-23 RX ORDER — SODIUM CHLORIDE, SODIUM LACTATE, POTASSIUM CHLORIDE, CALCIUM CHLORIDE 600; 310; 30; 20 MG/100ML; MG/100ML; MG/100ML; MG/100ML
100 INJECTION, SOLUTION INTRAVENOUS CONTINUOUS
Status: DISCONTINUED | OUTPATIENT
Start: 2021-11-23 | End: 2021-11-23 | Stop reason: HOSPADM

## 2021-11-23 RX ORDER — PROPOFOL 10 MG/ML
INJECTION, EMULSION INTRAVENOUS AS NEEDED
Status: DISCONTINUED | OUTPATIENT
Start: 2021-11-23 | End: 2021-11-23 | Stop reason: HOSPADM

## 2021-11-23 RX ORDER — SODIUM CHLORIDE 9 MG/ML
250 INJECTION, SOLUTION INTRAVENOUS AS NEEDED
Status: DISCONTINUED | OUTPATIENT
Start: 2021-11-23 | End: 2021-11-23 | Stop reason: HOSPADM

## 2021-11-23 RX ORDER — DULOXETIN HYDROCHLORIDE 30 MG/1
30 CAPSULE, DELAYED RELEASE ORAL DAILY
Status: DISCONTINUED | OUTPATIENT
Start: 2021-11-24 | End: 2021-11-25 | Stop reason: HOSPADM

## 2021-11-23 RX ORDER — NITROGLYCERIN 0.4 MG/1
0.4 TABLET SUBLINGUAL
Status: DISCONTINUED | OUTPATIENT
Start: 2021-11-23 | End: 2021-11-25 | Stop reason: HOSPADM

## 2021-11-23 RX ORDER — HEPARIN SODIUM 200 [USP'U]/100ML
INJECTION, SOLUTION INTRAVENOUS AS NEEDED
Status: DISCONTINUED | OUTPATIENT
Start: 2021-11-23 | End: 2021-11-23 | Stop reason: HOSPADM

## 2021-11-23 RX ORDER — MORPHINE SULFATE 2 MG/ML
2 INJECTION, SOLUTION INTRAMUSCULAR; INTRAVENOUS
Status: DISCONTINUED | OUTPATIENT
Start: 2021-11-23 | End: 2021-11-25 | Stop reason: HOSPADM

## 2021-11-23 RX ORDER — EPHEDRINE SULFATE 50 MG/ML
INJECTION, SOLUTION INTRAVENOUS AS NEEDED
Status: DISCONTINUED | OUTPATIENT
Start: 2021-11-23 | End: 2021-11-23 | Stop reason: HOSPADM

## 2021-11-23 RX ORDER — POLYETHYLENE GLYCOL 3350 17 G/17G
17 POWDER, FOR SOLUTION ORAL
Status: DISCONTINUED | OUTPATIENT
Start: 2021-11-24 | End: 2021-11-25 | Stop reason: HOSPADM

## 2021-11-23 RX ORDER — MIDAZOLAM HYDROCHLORIDE 1 MG/ML
2 INJECTION, SOLUTION INTRAMUSCULAR; INTRAVENOUS
Status: DISCONTINUED | OUTPATIENT
Start: 2021-11-23 | End: 2021-11-23 | Stop reason: HOSPADM

## 2021-11-23 RX ORDER — HEPARIN SODIUM 1000 [USP'U]/ML
INJECTION, SOLUTION INTRAVENOUS; SUBCUTANEOUS AS NEEDED
Status: DISCONTINUED | OUTPATIENT
Start: 2021-11-23 | End: 2021-11-23 | Stop reason: HOSPADM

## 2021-11-23 RX ADMIN — VANCOMYCIN HYDROCHLORIDE 1000 MG: 1 INJECTION, POWDER, LYOPHILIZED, FOR SOLUTION INTRAVENOUS at 09:56

## 2021-11-23 RX ADMIN — SODIUM CHLORIDE 120 MCG: 900 INJECTION, SOLUTION INTRAVENOUS at 12:48

## 2021-11-23 RX ADMIN — INSULIN LISPRO 9 UNITS: 100 INJECTION, SOLUTION INTRAVENOUS; SUBCUTANEOUS at 21:08

## 2021-11-23 RX ADMIN — PROTAMINE SULFATE 30 MG: 10 INJECTION, SOLUTION INTRAVENOUS at 13:35

## 2021-11-23 RX ADMIN — INSULIN GLARGINE 40 UNITS: 100 INJECTION, SOLUTION SUBCUTANEOUS at 21:09

## 2021-11-23 RX ADMIN — SODIUM CHLORIDE 60 MCG: 900 INJECTION, SOLUTION INTRAVENOUS at 12:41

## 2021-11-23 RX ADMIN — SODIUM CHLORIDE 60 MCG: 900 INJECTION, SOLUTION INTRAVENOUS at 13:02

## 2021-11-23 RX ADMIN — HEPARIN SODIUM 8000 UNITS: 1000 INJECTION, SOLUTION INTRAVENOUS; SUBCUTANEOUS at 12:46

## 2021-11-23 RX ADMIN — SODIUM CHLORIDE 60 MCG: 900 INJECTION, SOLUTION INTRAVENOUS at 13:04

## 2021-11-23 RX ADMIN — CEFAZOLIN SODIUM 2 G: 100 INJECTION, POWDER, LYOPHILIZED, FOR SOLUTION INTRAVENOUS at 21:03

## 2021-11-23 RX ADMIN — CEFAZOLIN SODIUM 2 G: 100 INJECTION, POWDER, LYOPHILIZED, FOR SOLUTION INTRAVENOUS at 17:55

## 2021-11-23 RX ADMIN — ONDANSETRON 4 MG: 2 INJECTION INTRAMUSCULAR; INTRAVENOUS at 23:44

## 2021-11-23 RX ADMIN — SODIUM CHLORIDE 75 ML/HR: 9 INJECTION, SOLUTION INTRAVENOUS at 19:32

## 2021-11-23 RX ADMIN — Medication 3 AMPULE: at 09:50

## 2021-11-23 RX ADMIN — EPHEDRINE SULFATE 5 MG: 50 INJECTION, SOLUTION INTRAVENOUS at 12:40

## 2021-11-23 RX ADMIN — HEPARIN SODIUM 2000 UNITS: 1000 INJECTION, SOLUTION INTRAVENOUS; SUBCUTANEOUS at 12:57

## 2021-11-23 RX ADMIN — PROPOFOL 50 MCG/KG/MIN: 10 INJECTION, EMULSION INTRAVENOUS at 12:02

## 2021-11-23 RX ADMIN — NICARDIPINE HYDROCHLORIDE 100 MCG: 0.1 INJECTION, SOLUTION INTRAVENOUS at 13:53

## 2021-11-23 RX ADMIN — MEMANTINE HYDROCHLORIDE 10 MG: 5 TABLET ORAL at 17:55

## 2021-11-23 RX ADMIN — EPHEDRINE SULFATE 10 MG: 50 INJECTION, SOLUTION INTRAVENOUS at 12:53

## 2021-11-23 RX ADMIN — DEXMEDETOMIDINE 1 MCG/KG/HR: 100 INJECTION, SOLUTION, CONCENTRATE INTRAVENOUS at 12:02

## 2021-11-23 RX ADMIN — SODIUM CHLORIDE 120 MCG: 900 INJECTION, SOLUTION INTRAVENOUS at 13:51

## 2021-11-23 RX ADMIN — SODIUM CHLORIDE 120 MCG: 900 INJECTION, SOLUTION INTRAVENOUS at 12:40

## 2021-11-23 RX ADMIN — INSULIN LISPRO 15 UNITS: 100 INJECTION, SOLUTION INTRAVENOUS; SUBCUTANEOUS at 18:04

## 2021-11-23 RX ADMIN — PROPOFOL 20 MG: 10 INJECTION, EMULSION INTRAVENOUS at 13:51

## 2021-11-23 RX ADMIN — SODIUM CHLORIDE 120 MCG: 900 INJECTION, SOLUTION INTRAVENOUS at 12:07

## 2021-11-23 RX ADMIN — EPHEDRINE SULFATE 5 MG: 50 INJECTION, SOLUTION INTRAVENOUS at 13:31

## 2021-11-23 RX ADMIN — SODIUM CHLORIDE, SODIUM LACTATE, POTASSIUM CHLORIDE, AND CALCIUM CHLORIDE 100 ML/HR: 600; 310; 30; 20 INJECTION, SOLUTION INTRAVENOUS at 09:51

## 2021-11-23 RX ADMIN — SODIUM CHLORIDE, SODIUM LACTATE, POTASSIUM CHLORIDE, AND CALCIUM CHLORIDE: 600; 310; 30; 20 INJECTION, SOLUTION INTRAVENOUS at 11:57

## 2021-11-23 RX ADMIN — HYDROCORTISONE SODIUM SUCCINATE 100 MG: 100 INJECTION, POWDER, FOR SOLUTION INTRAMUSCULAR; INTRAVENOUS at 13:21

## 2021-11-23 RX ADMIN — ATORVASTATIN CALCIUM 10 MG: 10 TABLET, FILM COATED ORAL at 21:03

## 2021-11-23 RX ADMIN — GABAPENTIN 600 MG: 300 CAPSULE ORAL at 17:55

## 2021-11-23 RX ADMIN — CEFAZOLIN 2 G: 1 INJECTION, POWDER, FOR SOLUTION INTRAVENOUS at 12:15

## 2021-11-23 RX ADMIN — PROPOFOL 20 MG: 10 INJECTION, EMULSION INTRAVENOUS at 12:02

## 2021-11-23 RX ADMIN — Medication 1 AMPULE: at 21:03

## 2021-11-23 RX ADMIN — FENTANYL CITRATE 50 MCG: 50 INJECTION INTRAMUSCULAR; INTRAVENOUS at 11:58

## 2021-11-23 NOTE — Clinical Note
Temporary pacemaker left in place. Inserted through the left groin. Temporary Pacer pacing in the right ventricle. Device secured using: tegaderm and suture. Electrical capture and mechanical capture obtained.

## 2021-11-23 NOTE — ANESTHESIA PROCEDURE NOTES
Arterial Line Placement    Start time: 11/23/2021 12:05 PM  End time: 11/23/2021 12:08 PM  Performed by: Mike Read CRNA  Authorized by:  Viktor Pritchard MD     Pre-Procedure  Indications:  Arterial pressure monitoring and blood sampling  Preanesthetic Checklist: patient identified, risks and benefits discussed, anesthesia consent, site marked, patient being monitored, timeout performed and patient being monitored      Procedure:   Prep:  ChloraPrep  Orientation:  Left  Location:  Radial artery  Catheter size:  20 G  Number of attempts:  1    Assessment:   Post-procedure:  Sterile dressing applied  Patient Tolerance:  Patient tolerated the procedure well with no immediate complications

## 2021-11-23 NOTE — Clinical Note
Right femoral artery. Accessed successfully. Micropuncture needle used. Using fluoro guidance.  Number of attempts =  1.

## 2021-11-23 NOTE — Clinical Note
Temporary pacemaker inserted. Inserted through the left groin. Temporary Pacer pacing in the right ventricle. Electrical capture and mechanical capture obtained.

## 2021-11-23 NOTE — H&P
[facility] CARDIOLOGY History & Physical                   Subjective:     Patient is a [de-identified] y.o. female who presents with severe symptomatic aortic stenosis. Plans for transcatheter aortic valve replacement. This has been discussed with the patient and family in detail.     Past Medical History:   Diagnosis Date    Anatomical narrow angle borderline glaucoma 6/10/2015    Blindness of left eye     Chronic angle-closure glaucoma 6/10/2015    COPD (chronic obstructive pulmonary disease) ([address] 06/10/2015    managed by family dr   [facility] Coronary artery disease involving native coronary artery of native heart 10/11/2021    stents x 2; no CP no SOB     Cortical, lamellar, or zonular cataract, nonsenile 6/10/2015    COVID-19 vaccine series completed 05/14/2021    J&J vaccine one dose regimen    Dementia ([address]     medications     Diabetes ([address]     Diverticulosis     Gastrointestinal disorder     diverticultits x 2 episodes    GERD (gastroesophageal reflux disease) 06/10/2015    controlled     Hyperlipemia 6/10/2015    Hypertension 06/10/2015    no meds at this time: BP is 111/50 as of 11/22/2021     Memory loss 06/10/2015    memantine     Methicillin resistant Staphylococcus aureus in conditions classified elsewhere and of unspecified site     Nearsightedness 6/10/2015    Neuropathy 06/10/2015    gabapentin    Nuclear cataract, nonsenile 6/10/2015    Obesity     Optic atrophy, 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    Osteoarthritis     Osteoarthrosis involving, or with mention of more than one site, but not specified as generalized, site unspecified(715.80) 6/10/2015    Other and combined forms of senile cataract 6/10/2015    Other ill-defined conditions(799.89)     back pain    Pain in limb     Psychiatric disorder     depression; memory issues     Regular astigmatism 6/10/2015    Severe stage glaucoma 06/10/2015    Stroke ([address]     over 20 years ago; blind in right eye due to stroke     Type II or unspecified type diabetes mellitus with neurological manifestations, not stated as uncontrolled(250.60) ([facility] [address] 06/10/2015    does not do fbs checks; today 20 fbs 250; Hgb A1c: 9.3 on 36    Umbilical hernia     no repair      Past Surgical History:   Procedure Laterality Date    COLONOSCOPY N/A 2020    COLONOSCOPY/ BMI 29 performed by Pam Campos DO at [facility] HX APPENDECTOMY      HX CHOLECYSTECTOMY      HX HEART CATHETERIZATION      x 2 stents     HX HEMORRHOIDECTOMY      HX HYSTERECTOMY      HX OTHER SURGICAL      benign growth removal from vocal cords    HX OTHER SURGICAL      full dentures r/t tooth extractions    HX OTHER SURGICAL      both eyes optic nerve    HX OTHER SURGICAL  2012    lipoma removal    HX OTHER SURGICAL      exploratory    HX OTHER SURGICAL  2012    removed blood clot from her anus    HX POLYPECTOMY      HX TONSILLECTOMY        Allergies   Allergen Reactions    Demerol [Meperidine] Nausea and Vomiting     Stomach upset    Morphine Nausea and Vomiting     Stomach upset     Social History     Tobacco Use    Smoking status: Former Smoker     Packs/day: 1.00     Years: 30.00     Pack years: 30.00     Quit date: 2000     Years since quittin.8    Smokeless tobacco: Never Used   Substance Use Topics    Alcohol use: No      FH:   Family History   Problem Relation Age of Onset    Diabetes Mother    [facility] Stroke Mother     Hypertension Mother     Diabetes Father     Heart Disease Father     Hypertension Father     Other Maternal Aubrievince Courtney tumors, cyst in kidneys    Diabetes Maternal Uncle     Cancer Paternal Aunt         lung    COPD Paternal Aunt     Cancer Paternal Uncle         throat cancer    Heart Disease Maternal Grandfather     Diabetes Maternal Grandfather     Cancer Paternal Grandfather         throat cancer    Other Other         clogged arteries diverticulitis    Breast Cancer Sister 65        Review of Systems   Constitutional: Negative for chills and fever. HENT: Negative for tinnitus. Eyes: Negative for blurred vision. Respiratory: Positive for shortness of breath. Negative for cough. Cardiovascular: Negative for orthopnea. Gastrointestinal: Negative for abdominal pain and nausea. Genitourinary: Negative for dysuria. Musculoskeletal: Positive for joint pain. Skin: Negative for rash. Neurological: Negative for tremors, sensory change and headaches. Endo/Heme/Allergies: Does not bruise/bleed easily. Psychiatric/Behavioral: Negative for depression and suicidal ideas. Objective:       Visit Vitals  /61 (BP 1 Location: Left upper arm, BP Patient Position: At rest)   Pulse 72   Temp 98.4 °F (36.9 °C)   Resp 18   Ht 5' 0.5\" (1.537 m)   Wt 158 lb (71.7 kg)   SpO2 99%   BMI 30.35 kg/m²       No intake/output data recorded. No intake/output data recorded. Physical Exam  HENT:      Head: Atraumatic. Eyes:      Conjunctiva/sclera: Conjunctivae normal.      Pupils: Pupils are equal, round, and reactive to light. Neck:      Thyroid: No thyromegaly. Vascular: No JVD. Cardiovascular:      Rate and Rhythm: Normal rate and regular rhythm. Heart sounds: Murmur heard. Pulmonary:      Effort: Pulmonary effort is normal.      Breath sounds: Normal breath sounds. Abdominal:      General: Bowel sounds are normal. There is no distension. Palpations: Abdomen is soft. Tenderness: There is no abdominal tenderness. Skin:     General: Skin is warm. Findings: No rash. Neurological:      Mental Status: She is alert and oriented to person, place, and time.    Psychiatric:         Mood and Affect: Mood normal.             Data Review:   Labs:   Recent Labs     11/22/21  0822   *   K 4.3   MG 2.2   BUN 19   CREA 0.98   *   WBC 5.9   HGB 13.7   HCT 39.0      INR 1.1      No results found for: TROIQ, STEVE, TROPT, TNIPOC        Assessment/Plan:   Active Problems:    Hypertension (6/10/2015)  Resume home medications post TAVR. Nonrheumatic aortic valve stenosis (9/16/2021)  Plans for transcatheter aortic valve replacement. Discussed risk, benefits, alternatives and procedure with patient and family. Informed consent obtained.                 STIVEN Hu  11/23/2021  11:20 AM

## 2021-11-23 NOTE — OP NOTES
Operative Report    Patient: Eder Alamo MRN: [id number]  SSN: [id number]    YOB: 1940  Age: [de-identified] y.o. Sex: female       Date of Surgery: 11/23/2021     Preoperative Diagnosis: Aortic valve stenosis, etiology of cardiac valve disease unspecified [I35.0]     Postoperative Diagnosis: Aortic valve stenosis, etiology of cardiac valve disease unspecified [I35.0]     Primary Cardiothoracic Surgeon: Ponce Shepherd MD     Primary Interventional Cardiologist: Swati Espinoza MD    Anesthesia: MAC     Procedure: Procedure(s):  TRANSCATHETER AORTIC VALVE REPLACEMENT (CATH)  TRANSCATHETER AORTIC VALVE REPLACEMENT   Transcatheter aortic valve replacement (26 mm Medtronic transcatheter aortic valve via right common femoral artery percutaneous approach). Findings:  Successful deployment of a 26 mm Medtronic transcatheter aortic valve via right common femoral artery percutaneous approach. good LV function with trace perivalvular regurgitation. The patient was in normal sinus rhythm at the conclusion of the procedure. Indication for Procedure: The patient is a [de-identified] y.o. female with symptomatic severe aortic stenosis. She was not felt to be a candidate for surgical aortic valve replacement and after discussion at the multidisciplinary valve board transcatheter aortic valve replacement was recommended. After a thorough discussion of all the risks and proposed benefits, the patient agreed to proceed. Procedure in Detail:   Patient premedicated and brought to the operating suite. Time-out performed. Standard monitoring lines placed, and the patient was intubated and placed under general anesthesia without event. Transesophageal echocardiogram was performed, confirming the above valve pathology. Intravenous cefazolin was administered. Patient prepped and draped in standard, meticulous surgical fashion.  Ioban drapes were used.     Access to the right common femoral artery and vein was obtained under ultrasound guidance, and 6-Rwandan sheaths were placed. A temporary transvenous right ventricular pacing wire was positioned via the femoral vein. Next, a micropuncture kit was used to gain access to the left common femoral artery and a 6-[place]an sheath was placed. This was upsized and the 14-[place] Abdi transcatheter heart valve sheath was placed after deploying Perclose devices in a pre-close fashion.      Next, a balloon valvuloplasty was performed. The patient remained hemodynamically stable. The 26mm Medtronic valve was then prepped and mounted onto the delivery system in the correct orientation. This was then positioned in the ascending aorta, and then advanced across the aortic valve and deployed successfully. The details of this are dictated under a separate cover in Dr. Shirley Linear report. Following deployment, an aortogram and transesophageal echocardiogram were performed, which demonstrated a trace aortic regurgitation and a well-seated, well-functioning prosthetic valve in the aortic position. The patient remained hemodynamically stable and in normal sinus rhythm. At this point, the delivery system and the sheath were removed from the right common femoral artery, and hemostasis was obtained using the Perclose sutures. Sheaths were also removed from the left common femoral artery and vein. At the conclusion of the procedure, the patient was hemodynamically stable and hemostasis was good. The patient was then transported to the CV ICU in critical but stable condition. At the end of the case, all sharp, sponge, and instrument counts were reported as being correct. Estimated Blood Loss:  minimal    Tourniquet Time: * No tourniquets in log *      Implants:   Implant Name Type Inv.  Item Serial No.  Lot No. LRB No. Used Action   EVOLUTPRO+ TRANSCATHETER AORTIC VALVE   [id number] MEDTRONIC  N/A 1 Implanted               Specimens: * No specimens in log *        Drains: None Complications: None    Counts: Sponge and needle counts were correct times two.     Signed By:  Ronald Johnson MD     November 23, 2021

## 2021-11-23 NOTE — Clinical Note
TRANSFER - OUT REPORT:     Verbal report given to: CVICU RN. Report consisted of patient's Situation, Background, Assessment and   Recommendations(SBAR). Opportunity for questions and clarification was provided. Patient transported with a Registered Nurse. Patient transported to: CVICU, 101.

## 2021-11-23 NOTE — Clinical Note
Sheath #4: Sheath: inserted. Sheath inserted/placed in the right femoral  artery.  preclosed RFA with perclose x2

## 2021-11-23 NOTE — ROUTINE PROCESS
TRANSFER - IN REPORT:    Verbal report received from [address] [place] [address] Freddy Sanchez on Celina Dubois  being received from OR for routine post - op      Report consisted of patients Situation, Background, Assessment and   Recommendations(SBAR). Information from the following report(s) SBAR, Kardex, OR Summary, Procedure Summary, Intake/Output, MAR, Recent Results and Cardiac Rhythm SR with BBB was reviewed with the receiving nurse. Opportunity for questions and clarification was provided. Assessment completed upon patients arrival to unit and care assumed per [facility].

## 2021-11-23 NOTE — BRIEF OP NOTE
Brief Postoperative Note    Patient: Fara Medina  YOB: 1940  MRN: [id number]    Date of Procedure: 11/23/2021     Pre-Op Diagnosis: Aortic valve stenosis, etiology of cardiac valve disease unspecified [I35.0]    Post-Op Diagnosis: Same as preoperative diagnosis. Procedure(s):  TRANSCATHETER AORTIC VALVE REPLACEMENT (CATH)  TRANSCATHETER AORTIC VALVE REPLACEMENT    Surgeon(s): MD Gerardo Nassar MD Sydnee Como, MD    Surgical Assistant: None    Anesthesia: MAC     Estimated Blood Loss (mL): Minimal    Complications: None    Specimens: * No specimens in log *     Implants:   Implant Name Type Inv.  Item Serial No.  Lot No. LRB No. Used Action   EVOLUTPRO+ TRANSCATHETER AORTIC VALVE   [id number] MEDTRONIC  N/A 1 Implanted       Drains: * No LDAs found *    Findings: as    Electronically Signed by Quincy Aviles MD on 11/23/2021 at 1:21 PM

## 2021-11-23 NOTE — ANESTHESIA PREPROCEDURE EVALUATION
Relevant Problems   RESPIRATORY SYSTEM   (+) COPD (chronic obstructive pulmonary disease) (HCC)      NEUROLOGY   (+) Recurrent depression (HCC)      CARDIOVASCULAR   (+) Coronary artery disease involving native coronary artery of native heart   (+) Hypertension   (+) Murmur, cardiac   (+) Nonrheumatic aortic valve stenosis      GASTROINTESTINAL   (+) GERD (gastroesophageal reflux disease)      ENDOCRINE   (+) Type 2 diabetes with nephropathy (HCC)       Anesthetic History   No history of anesthetic complications            Review of Systems / Medical History  Patient summary reviewed and pertinent labs reviewed    Pulmonary    COPD               Neuro/Psych       CVA  Psychiatric history and dementia (very mild)    Comments: Neuropathy  Cardiovascular    Hypertension  Valvular problems/murmurs: aortic stenosis        CAD, cardiac stents and hyperlipidemia    Exercise tolerance: <4 METS  Comments: Critical AV stenosis AV 0.5cm2, preserved LV systolic function    DESx2 to proximal and mid LAD Sept 2021, nonobstructive disease otherwise   GI/Hepatic/Renal     GERD           Endo/Other    Diabetes (reports normal 200s-500s, noncompliant with diet): poorly controlled, using insulin    Arthritis     Other Findings            Physical Exam    Airway  Mallampati: I  TM Distance: 4 - 6 cm  Neck ROM: normal range of motion   Mouth opening: Normal     Cardiovascular    Rhythm: regular  Rate: normal    Murmur: Grade 2, Aortic area  Pertinent negatives: No peripheral edema   Dental    Dentition: Edentulous     Pulmonary  Breath sounds clear to auscultation               Abdominal  GI exam deferred       Other Findings            Anesthetic Plan    ASA: 4  Anesthesia type: total IV anesthesia    Monitoring Plan: Arterial line      Induction: Intravenous  Anesthetic plan and risks discussed with: Patient and Spouse

## 2021-11-23 NOTE — Clinical Note
removed. Balloon/Stent status: deployed.  Recaptured and removed with [facility] wire

## 2021-11-23 NOTE — Clinical Note
Sheath #4: sheath exchanged for SYSTEM DEL CATH 14FR [address] L107CM CAP DIA6MM W/ 30CM INLINE.

## 2021-11-23 NOTE — Clinical Note
Single view of the aortic root obtained using power injection. Total volume = 20 mL. Rate = 15 mL/sec. Pressure = 600 PSI. Rate of rise = 0.5 sec.

## 2021-11-24 ENCOUNTER — APPOINTMENT (OUTPATIENT)
Dept: NON INVASIVE DIAGNOSTICS | Age: 81
DRG: 267 | End: 2021-11-24
Attending: INTERNAL MEDICINE
Payer: MEDICARE

## 2021-11-24 PROBLEM — Z95.3 S/P TAVR (TRANSCATHETER AORTIC VALVE REPLACEMENT), BIOPROSTHETIC: Status: ACTIVE | Noted: 2021-11-24

## 2021-11-24 PROBLEM — I35.0 AORTIC STENOSIS: Status: RESOLVED | Noted: 2021-11-23 | Resolved: 2021-11-24

## 2021-11-24 LAB
ANION GAP SERPL CALC-SCNC: 8 MMOL/L (ref 7–16)
BACTERIA SPEC CULT: NORMAL
BASELINE ACT - BAACT: 150 SEC
BUN SERPL-MCNC: 23 MG/DL (ref 8–23)
CALCIUM SERPL-MCNC: 8.8 MG/DL (ref 8.3–10.4)
CHLORIDE SERPL-SCNC: 108 MMOL/L (ref 98–107)
CO2 SERPL-SCNC: 25 MMOL/L (ref 21–32)
CREAT SERPL-MCNC: 0.76 MG/DL (ref 0.6–1)
ECHO AO ASC DIAM: 2.82 CM
ECHO AV AREA PEAK VELOCITY: 1.42 CM2
ECHO AV AREA VTI: 1.63 CM2
ECHO AV AREA/BSA PEAK VELOCITY: 0.8 CM2/M2
ECHO AV AREA/BSA VTI: 1 CM2/M2
ECHO AV MEAN GRADIENT: 10 MMHG
ECHO AV PEAK GRADIENT: 23 MMHG
ECHO AV PEAK VELOCITY: 239 CM/S
ECHO AV VTI: 47.3 CM
ECHO EST RA PRESSURE: 3 MMHG
ECHO LA AREA 2C: 20.7 CM2
ECHO LA AREA 4C: 22.5 CM2
ECHO LA MAJOR AXIS: 6.04 CM
ECHO LA MINOR AXIS: 3.57 CM
ECHO LV E' LATERAL VELOCITY: 5.32 CM/S
ECHO LV E' SEPTAL VELOCITY: 4.93 CM/S
ECHO LV EDV A4C: 48.9 CM3
ECHO LV ESV A4C: 14.5 CM3
ECHO LV INTERNAL DIMENSION DIASTOLIC: 3.76 CM (ref 3.9–5.3)
ECHO LV INTERNAL DIMENSION SYSTOLIC: 2.49 CM
ECHO LV IVSD: 1.03 CM (ref 0.6–0.9)
ECHO LV MASS 2D: 124.7 G (ref 67–162)
ECHO LV MASS INDEX 2D: 73.8 G/M2 (ref 43–95)
ECHO LV POSTERIOR WALL DIASTOLIC: 1.08 CM (ref 0.6–0.9)
ECHO LVOT DIAM: 1.87 CM
ECHO LVOT PEAK GRADIENT: 6 MMHG
ECHO LVOT VTI: 28 CM
ECHO MV A VELOCITY: 149 CM/S
ECHO MV E DECELERATION TIME (DT): 264 MS
ECHO MV E VELOCITY: 111 CM/S
ECHO MV E/A RATIO: 0.74
ECHO MV E/E' LATERAL: 20.86
ECHO MV E/E' RATIO (AVERAGED): 21.69
ECHO MV E/E' SEPTAL: 22.52
ECHO MV MAX VELOCITY: 174 CM/S
ECHO MV MEAN GRADIENT: 4 MMHG
ECHO MV PEAK GRADIENT: 12 MMHG
ECHO MV VTI: 47 CM
ECHO RIGHT VENTRICULAR SYSTOLIC PRESSURE (RVSP): 23 MMHG
ECHO RV INTERNAL DIMENSION: 2.71 CM
ECHO RV TAPSE: 2.05 CM (ref 1.5–2)
ECHO TV REGURGITANT MAX VELOCITY: 224 CM/S
ECHO TV REGURGITANT PEAK GRADIENT: 20 MMHG
ERYTHROCYTE [DISTWIDTH] IN BLOOD BY AUTOMATED COUNT: 11.5 % (ref 11.9–14.6)
GLUCOSE BLD STRIP.AUTO-MCNC: 132 MG/DL (ref 65–100)
GLUCOSE BLD STRIP.AUTO-MCNC: 173 MG/DL (ref 65–100)
GLUCOSE BLD STRIP.AUTO-MCNC: 174 MG/DL (ref 65–100)
GLUCOSE BLD STRIP.AUTO-MCNC: 82 MG/DL (ref 65–100)
GLUCOSE SERPL-MCNC: 95 MG/DL (ref 65–100)
HCT VFR BLD AUTO: 31.2 % (ref 35.8–46.3)
HEPARIN BOLUS-PATIENT - HBPAT: 7688 UNITS
HEPARIN BOLUS-PUMP - HBPUMP: 0 UNITS
HEPARIN BOLUS-TOTAL - HBTOT: 7688 UNITS
HGB BLD-MCNC: 10.7 G/DL (ref 11.7–15.4)
MAGNESIUM SERPL-MCNC: 2.1 MG/DL (ref 1.8–2.4)
MCH RBC QN AUTO: 30.1 PG (ref 26.1–32.9)
MCHC RBC AUTO-ENTMCNC: 34.3 G/DL (ref 31.4–35)
MCV RBC AUTO: 87.6 FL (ref 79.6–97.8)
NRBC # BLD: 0 K/UL (ref 0–0.2)
PLATELET # BLD AUTO: 121 K/UL (ref 150–450)
PMV BLD AUTO: 10.2 FL (ref 9.4–12.3)
POTASSIUM SERPL-SCNC: 3.7 MMOL/L (ref 3.5–5.1)
PROJECTED HEPARIN CONC - PHEP: 1.2 MG/KG
RBC # BLD AUTO: 3.56 M/UL (ref 4.05–5.2)
SERVICE CMNT-IMP: ABNORMAL
SERVICE CMNT-IMP: NORMAL
SERVICE CMNT-IMP: NORMAL
SLOPE: 126
SODIUM SERPL-SCNC: 141 MMOL/L (ref 136–145)
WBC # BLD AUTO: 7.4 K/UL (ref 4.3–11.1)

## 2021-11-24 PROCEDURE — 82962 GLUCOSE BLOOD TEST: CPT

## 2021-11-24 PROCEDURE — 2709999900 HC NON-CHARGEABLE SUPPLY

## 2021-11-24 PROCEDURE — 80048 BASIC METABOLIC PNL TOTAL CA: CPT

## 2021-11-24 PROCEDURE — 99232 SBSQ HOSP IP/OBS MODERATE 35: CPT | Performed by: INTERNAL MEDICINE

## 2021-11-24 PROCEDURE — 74011636637 HC RX REV CODE- 636/637: Performed by: INTERNAL MEDICINE

## 2021-11-24 PROCEDURE — 85027 COMPLETE CBC AUTOMATED: CPT

## 2021-11-24 PROCEDURE — 77030038269 HC DRN EXT URIN PURWCK BARD -A

## 2021-11-24 PROCEDURE — 93306 TTE W/DOPPLER COMPLETE: CPT

## 2021-11-24 PROCEDURE — 74011000250 HC RX REV CODE- 250: Performed by: INTERNAL MEDICINE

## 2021-11-24 PROCEDURE — 65660000000 HC RM CCU STEPDOWN

## 2021-11-24 PROCEDURE — 65660000004 HC RM CVT STEPDOWN

## 2021-11-24 PROCEDURE — 74011250636 HC RX REV CODE- 250/636: Performed by: INTERNAL MEDICINE

## 2021-11-24 PROCEDURE — 74011250637 HC RX REV CODE- 250/637: Performed by: INTERNAL MEDICINE

## 2021-11-24 PROCEDURE — 83735 ASSAY OF MAGNESIUM: CPT

## 2021-11-24 PROCEDURE — 74011250637 HC RX REV CODE- 250/637: Performed by: PHYSICIAN ASSISTANT

## 2021-11-24 RX ADMIN — ACETAMINOPHEN 650 MG: 325 TABLET ORAL at 15:03

## 2021-11-24 RX ADMIN — GABAPENTIN 600 MG: 300 CAPSULE ORAL at 08:47

## 2021-11-24 RX ADMIN — ACETAMINOPHEN 650 MG: 325 TABLET ORAL at 23:48

## 2021-11-24 RX ADMIN — INSULIN LISPRO 3 UNITS: 100 INJECTION, SOLUTION INTRAVENOUS; SUBCUTANEOUS at 20:09

## 2021-11-24 RX ADMIN — CLOPIDOGREL BISULFATE 75 MG: 75 TABLET ORAL at 08:48

## 2021-11-24 RX ADMIN — INSULIN LISPRO 3 UNITS: 100 INJECTION, SOLUTION INTRAVENOUS; SUBCUTANEOUS at 16:28

## 2021-11-24 RX ADMIN — INSULIN GLARGINE 15 UNITS: 100 INJECTION, SOLUTION SUBCUTANEOUS at 20:09

## 2021-11-24 RX ADMIN — HYDROXYZINE HYDROCHLORIDE 25 MG: 25 TABLET, FILM COATED ORAL at 20:07

## 2021-11-24 RX ADMIN — MEMANTINE HYDROCHLORIDE 10 MG: 5 TABLET ORAL at 17:59

## 2021-11-24 RX ADMIN — MEMANTINE HYDROCHLORIDE 10 MG: 5 TABLET ORAL at 08:48

## 2021-11-24 RX ADMIN — DULOXETINE HYDROCHLORIDE 30 MG: 30 CAPSULE, DELAYED RELEASE ORAL at 08:47

## 2021-11-24 RX ADMIN — Medication 1 AMPULE: at 08:47

## 2021-11-24 RX ADMIN — Medication 1 AMPULE: at 20:08

## 2021-11-24 RX ADMIN — CEFAZOLIN SODIUM 2 G: 100 INJECTION, POWDER, LYOPHILIZED, FOR SOLUTION INTRAVENOUS at 06:24

## 2021-11-24 RX ADMIN — GABAPENTIN 600 MG: 300 CAPSULE ORAL at 17:59

## 2021-11-24 RX ADMIN — ATORVASTATIN CALCIUM 10 MG: 10 TABLET, FILM COATED ORAL at 20:07

## 2021-11-24 RX ADMIN — ASPIRIN 81 MG: 81 TABLET, CHEWABLE ORAL at 08:47

## 2021-11-24 NOTE — PROGRESS NOTES
TRANSFER - OUT REPORT:    Verbal report given to Harman RN(name) on Celina Dubois  being transferred to [facility](unit) for routine progression of care       Report consisted of patients Situation, Background, Assessment and   Recommendations(SBAR). Information from the following report(s) SBAR, Kardex, OR Summary, Intake/Output, MAR, Recent Results, Med Rec Status and Cardiac Rhythm sr was reviewed with the receiving nurse. Lines:   Peripheral IV 11/23/21 Left; Posterior Hand (Active)   Site Assessment Clean, dry, & intact 11/24/21 0715   Phlebitis Assessment 0 11/24/21 0715   Infiltration Assessment 0 11/24/21 0715   Dressing Status Clean, dry, & intact 11/24/21 0715   Dressing Type Transparent 11/24/21 0715   Hub Color/Line Status Green; Flushed; Patent 11/24/21 0715   Alcohol Cap Used No 11/24/21 0300       Peripheral IV 11/23/21 Posterior; Right Hand (Active)   Site Assessment Clean, dry, & intact 11/24/21 0715   Phlebitis Assessment 0 11/24/21 0715   Infiltration Assessment 0 11/24/21 0715   Dressing Status Clean, dry, & intact 11/24/21 0715   Dressing Type Transparent 11/24/21 0715   Hub Color/Line Status Green; Flushed; Patent 11/24/21 0715   Alcohol Cap Used No 11/24/21 0300        Opportunity for questions and clarification was provided.       Patient transported with:   Registered Nurse

## 2021-11-24 NOTE — PROGRESS NOTES
Pt post TAVR. Up in chair, all procedure sites benign. SR/SB.  VSS, eating well. Problem: Diabetes Self-Management  Goal: *Disease process and treatment process  Description: Define diabetes and identify own type of diabetes; list 3 options for treating diabetes. Outcome: Progressing Towards Goal  Goal: *Incorporating nutritional management into lifestyle  Description: Describe effect of type, amount and timing of food on blood glucose; list 3 methods for planning meals. Outcome: Progressing Towards Goal  Goal: *Incorporating physical activity into lifestyle  Description: State effect of exercise on blood glucose levels. Outcome: Progressing Towards Goal  Goal: *Developing strategies to promote health/change behavior  Description: Define the ABC's of diabetes; identify appropriate screenings, schedule and personal plan for screenings. Outcome: Progressing Towards Goal  Goal: *Using medications safely  Description: State effect of diabetes medications on diabetes; name diabetes medication taking, action and side effects. Outcome: Progressing Towards Goal  Goal: *Monitoring blood glucose, interpreting and using results  Description: Identify recommended blood glucose targets  and personal targets. Outcome: Progressing Towards Goal  Goal: *Prevention, detection, treatment of acute complications  Description: List symptoms of hyper- and hypoglycemia; describe how to treat low blood sugar and actions for lowering  high blood glucose level. Outcome: Progressing Towards Goal  Goal: *Prevention, detection and treatment of chronic complications  Description: Define the natural course of diabetes and describe the relationship of blood glucose levels to long term complications of diabetes.   Outcome: Progressing Towards Goal  Goal: *Developing strategies to address psychosocial issues  Description: Describe feelings about living with diabetes; identify support needed and support network  Outcome: Progressing Towards Goal  Goal: *Insulin pump training  Outcome: Progressing Towards Goal  Goal: *Sick day guidelines  Outcome: Progressing Towards Goal  Goal: *Patient Specific Goal (EDIT GOAL, INSERT TEXT)  Outcome: Progressing Towards Goal     Problem: Patient Education: Go to Patient Education Activity  Goal: Patient/Family Education  Outcome: Progressing Towards Goal     Problem: Falls - Risk of  Goal: *Absence of Falls  Description: Document Flynn Fall Risk and appropriate interventions in the flowsheet. Outcome: Progressing Towards Goal  Note: Fall Risk Interventions:  Mobility Interventions: Bed/chair exit alarm, Patient to call before getting OOB, Utilize walker, cane, or other assistive device         Medication Interventions: Bed/chair exit alarm, Evaluate medications/consider consulting pharmacy, Patient to call before getting OOB    Elimination Interventions: Bed/chair exit alarm, Call light in reach, Patient to call for help with toileting needs    History of Falls Interventions: Bed/chair exit alarm, Evaluate medications/consider consulting pharmacy, Room close to nurse's station         Problem: Patient Education: Go to Patient Education Activity  Goal: Patient/Family Education  Outcome: Progressing Towards Goal     Problem: Pressure Injury - Risk of  Goal: *Prevention of pressure injury  Description: Document Mickey Scale and appropriate interventions in the flowsheet. Outcome: Progressing Towards Goal  Note: Pressure Injury Interventions:  Sensory Interventions: Assess changes in LOC, Turn and reposition approx.  every two hours (pillows and wedges if needed), Pressure redistribution bed/mattress (bed type), Keep linens dry and wrinkle-free, Maintain/enhance activity level, Minimize linen layers    Moisture Interventions: Check for incontinence Q2 hours and as needed, Internal/External urinary devices, Minimize layers    Activity Interventions: Pressure redistribution bed/mattress(bed type)    Mobility Interventions: HOB 30 degrees or less, Float heels, Pressure redistribution bed/mattress (bed type), Turn and reposition approx.  every two hours(pillow and wedges)    Nutrition Interventions: Document food/fluid/supplement intake    Friction and Shear Interventions: Foam dressings/transparent film/skin sealants, HOB 30 degrees or less, Lift sheet, Transferring/repositioning devices                Problem: Patient Education: Go to Patient Education Activity  Goal: Patient/Family Education  Outcome: Progressing Towards Goal     Problem: Patient Education: Go to Patient Education Activity  Goal: Patient/Family Education  Outcome: Progressing Towards Goal     Problem: Pre-procedure, TAVR  Goal: *Verbalizes Description of Procedure  Outcome: Progressing Towards Goal  Goal: *Hemodynamically stable  Outcome: Progressing Towards Goal  Goal: *Verbalizes Description of Procedure  Outcome: Progressing Towards Goal  Goal: Activity/Safety  Outcome: Progressing Towards Goal  Goal: Consults  Outcome: Progressing Towards Goal  Goal: Diagnostic Tests/Procedures  Outcome: Progressing Towards Goal  Goal: Nutrition/Diet  Outcome: Progressing Towards Goal  Goal: Medications  Outcome: Progressing Towards Goal  Goal: Respiratory  Outcome: Progressing Towards Goal  Goal: Treatments/Interventions/Procedures  Outcome: Progressing Towards Goal  Goal: Psychosocial Needs  Outcome: Progressing Towards Goal     Problem: Post-procedure,Day of TAVR  Goal: *Stable Cardiac Rhythm  Outcome: Progressing Towards Goal  Goal: *Hemodynamically stable  Outcome: Progressing Towards Goal  Goal: *Optimal pain control at patient's stated goal  Outcome: Progressing Towards Goal  Goal: *Lungs clear or at baseline  Outcome: Progressing Towards Goal  Goal: *Demonstrates progressive activity  Outcome: Progressing Towards Goal  Goal: *Procedure site is without bleeding and signs of infection six hours post sheath removal  Outcome: Progressing Towards Goal  Goal: *Pulses palpable, skin color within defined limits, skin temperature warm  Outcome: Progressing Towards Goal  Goal: *Bilateral lower extremities neurovascularly intact  Outcome: Progressing Towards Goal  Goal: Activity/Safety  Outcome: Progressing Towards Goal  Goal: Consults  Outcome: Progressing Towards Goal  Goal: Diagnostic Tests/Procedures  Outcome: Progressing Towards Goal  Goal: Nutrition/Diet  Outcome: Progressing Towards Goal  Goal: Discharge Planning  Outcome: Progressing Towards Goal  Goal: Medications  Outcome: Progressing Towards Goal  Goal: Respiratory  Outcome: Progressing Towards Goal  Goal: Treatments/Interventions/Procedures  Outcome: Progressing Towards Goal  Goal: Psychosocial Needs  Outcome: Progressing Towards Goal     Problem: Post-procedure Day 1,TAVR  Goal: *Stable Cardiac Rhythm  Outcome: Progressing Towards Goal  Goal: *Hemodynamically stable  Outcome: Progressing Towards Goal  Goal: *Optimal pain control at patient's stated goal  Outcome: Progressing Towards Goal  Goal: *Lungs clear or at baseline  Outcome: Progressing Towards Goal  Goal: *Demonstrates progressive activity  Outcome: Progressing Towards Goal  Goal: *No signs of infection or puncture site complication  Outcome: Progressing Towards Goal  Goal: *Verbalizes and demonstrates puncture site care  Outcome: Progressing Towards Goal  Goal: Activity/Safety  Outcome: Progressing Towards Goal  Goal: Consults  Outcome: Progressing Towards Goal  Goal: Diagnostic Tests/Procedures  Outcome: Progressing Towards Goal  Goal: Nutrition/Diet  Outcome: Progressing Towards Goal  Goal: Discharge Planning  Outcome: Progressing Towards Goal  Goal: Medications  Outcome: Progressing Towards Goal  Goal: Respiratory  Outcome: Progressing Towards Goal  Goal: Treatments/Interventions/Procedures  Outcome: Progressing Towards Goal  Goal: Psychosocial Needs  Outcome: Progressing Towards Goal     Problem: Post-procedure,Day 2/Day of Discharge,TAVR  Goal: *Stable Cardiac Rhythm  Outcome: Progressing Towards Goal  Goal: *Hemodynamically stable  Outcome: Progressing Towards Goal  Goal: *Optimal pain control at patient's stated goal  Outcome: Progressing Towards Goal  Goal: *Lungs clear or at baseline  Outcome: Progressing Towards Goal  Goal: *Demonstrates ability to perform prescribed activity without shortness of breath or discomfort  Outcome: Progressing Towards Goal  Goal: *Verbalizes Home Activity Guidelines  Outcome: Progressing Towards Goal  Goal: *No signs of infection or puncture site complication  Outcome: Progressing Towards Goal  Goal: *Verbalizes and demonstrates puncture site care  Outcome: Progressing Towards Goal  Goal: *Verbalizes understanding and describes prescribed diet  Outcome: Progressing Towards Goal  Goal: *Verbalizes understanding and describes medication purposes and frequencies  Outcome: Progressing Towards Goal  Goal: *Anxiety reduced or absent  Outcome: Progressing Towards Goal  Goal: *Understands and describes signs and symptoms to report to providers  Outcome: Progressing Towards Goal  Goal: *Describes follow-up/return visits to physicians  Outcome: Progressing Towards Goal  Goal: *Describes available resources and support systems  Outcome: Progressing Towards Goal  Goal: *Influenza immunization  Outcome: Progressing Towards Goal  Goal: *Pneumococcal immunization  Outcome: Progressing Towards Goal  Goal: *Identifies and verbalizes understanding of the valve ID card, antibiotic card and Red reminder bracelet  Outcome: Progressing Towards Goal  Goal: Activity/Safety  Outcome: Progressing Towards Goal  Goal: Consults  Outcome: Progressing Towards Goal  Goal: Diagnostic Tests/Procedures  Outcome: Progressing Towards Goal  Goal: Nutrition/Diet  Outcome: Progressing Towards Goal  Goal: Discharge Planning  Outcome: Progressing Towards Goal  Goal: Medications  Outcome: Progressing Towards Goal  Goal: Respiratory  Outcome: Progressing Towards Goal  Goal: Treatments/Interventions/Procedures  Outcome: Progressing Towards Goal  Goal: Psychosocial Needs  Outcome: Progressing Towards Goal

## 2021-11-24 NOTE — PROGRESS NOTES
Bedside shift report given to Yasmine Nolen RN (oncoming nurse) by Sina Schulte RN (offgoing nurse). Bedside shift report included the following information: SBAR, Kardex, Procedure Summary, MAR, and Recent Results.

## 2021-11-24 NOTE — ANESTHESIA POSTPROCEDURE EVALUATION
Procedure(s):  TRANSCATHETER AORTIC VALVE REPLACEMENT (CATH). total IV anesthesia    Anesthesia Post Evaluation      Multimodal analgesia: multimodal analgesia used between 6 hours prior to anesthesia start to PACU discharge  Patient location during evaluation: ICU  Patient participation: complete - patient participated  Level of consciousness: sleepy but conscious  Pain management: adequate  Anesthetic complications: no  Cardiovascular status: acceptable  Respiratory status: acceptable  Hydration status: acceptable  Post anesthesia nausea and vomiting:  none      INITIAL Post-op Vital signs:   Vitals Value Taken Time   BP 86/49 11/23/21 1924   Temp 36.4 °C (97.5 °F) 11/23/21 1700   Pulse 69 11/23/21 1930   Resp 12 11/23/21 1930   SpO2 99 % 11/23/21 1930   Vitals shown include unvalidated device data.

## 2021-11-24 NOTE — ROUTINE PROCESS
TRANSFER - IN REPORT:    Verbal report received from VIRIDIANA Nielson(name) on Celina Dubois  being received from [facility](unit) for routine post - op      Report consisted of patients Situation, Background, Assessment and   Recommendations(SBAR). Information from the following report(s) SBAR, Kardex, Procedure Summary, Intake/Output, MAR, Recent Results, Med Rec Status and Cardiac Rhythm NSR, sinus bradycardia was reviewed with the receiving nurse. Opportunity for questions and clarification was provided. Assessment completed upon patients arrival to unit and care assumed. Dual skin assessment completed with RN. Allevyn pulled back, skin inspected, no breakdown noted.

## 2021-11-24 NOTE — PROGRESS NOTES
[facility] CARDIOLOGY PROGRESS NOTE           11/24/2021 7:26 AM    Admit Date: 11/23/2021      Subjective:   Patient denies any complaints. No chest pain. Blood pressure mildly low. 1 short pause which likely is a nonconducted PAC but no evidence of heart block. Hemoglobin decreased from 13 to 10. Renal function normal    ROS:  Cardiovascular:  As noted above    Objective:      Vitals:    11/24/21 0615 11/24/21 0630 11/24/21 0645 11/24/21 0709   BP: (!) 98/49 (!) 98/55     Pulse: 61 (!) 59 61    Resp: 10 17 11    Temp:       SpO2: 98% 100% 98%    Weight:    159 lb 6.3 oz (72.3 kg)   Height:    5' 0.5\" (1.537 m)       Physical Exam:  General-No Acute Distress  Neck- supple, no JVD  CV- regular rate and rhythm no MRG  Lung- clear bilaterally  Abd- soft, nontender, nondistended  Ext- no edema bilaterally. Skin- warm and dry      Data Review:   Recent Labs     11/24/21  0307 11/22/21  0822    133*   K 3.7 4.3   MG 2.1 2.2   BUN 23 19   CREA 0.76 0.98   GLU 95 375*   WBC 7.4 5.9   HGB 10.7* 13.7   HCT 31.2* 39.0   * 154   INR  --  1.1      No results found for: Rajni Cover, TNIPOC    Assessment/Plan:     Active Problems:    Hypertension (6/10/2015)  Blood pressure is low. Encourage hydration. Hyperlipemia (6/10/2015)  Continue Lipitor. Type II diabetes mellitus with neurological manifestations, uncontrolled ([address] (6/10/2015)  Continue insulin therapy. Diabetic diet      Nonrheumatic aortic valve stenosis (9/16/2021)  Status post TAVR        S/p TAVR (transcatheter aortic valve replacement), bioprosthetic (11/24/2021)  Continue telemetry. Transfer out of ICU. Check echo today.                   Becca Robles MD  11/24/2021 7:26 AM

## 2021-11-24 NOTE — PROGRESS NOTES
Bedside shift report received from Mushtaq Bourgeois RN (offgoing nurse). Report given to Elisa Edwards RN. Vital signs stable. No complaints present. Patient in stable condition.

## 2021-11-24 NOTE — PROGRESS NOTES
Dr. Chidi Lawrence notified about sBPs 80s/90s, MAPs < 65. Orders to keep MAPs > 60, systolic BPs 86-01, and orders to start phenylephrine gtt if BPs start trending downward.

## 2021-11-25 VITALS
DIASTOLIC BLOOD PRESSURE: 58 MMHG | SYSTOLIC BLOOD PRESSURE: 113 MMHG | BODY MASS INDEX: 31.14 KG/M2 | TEMPERATURE: 98.2 F | RESPIRATION RATE: 18 BRPM | WEIGHT: 158.6 LBS | HEART RATE: 69 BPM | HEIGHT: 60 IN | OXYGEN SATURATION: 97 %

## 2021-11-25 LAB
ANION GAP SERPL CALC-SCNC: 5 MMOL/L (ref 7–16)
BUN SERPL-MCNC: 25 MG/DL (ref 8–23)
CALCIUM SERPL-MCNC: 8.8 MG/DL (ref 8.3–10.4)
CHLORIDE SERPL-SCNC: 110 MMOL/L (ref 98–107)
CO2 SERPL-SCNC: 27 MMOL/L (ref 21–32)
CREAT SERPL-MCNC: 0.85 MG/DL (ref 0.6–1)
ERYTHROCYTE [DISTWIDTH] IN BLOOD BY AUTOMATED COUNT: 11.8 % (ref 11.9–14.6)
GLUCOSE BLD STRIP.AUTO-MCNC: 99 MG/DL (ref 65–100)
GLUCOSE SERPL-MCNC: 102 MG/DL (ref 65–100)
HCT VFR BLD AUTO: 31.3 % (ref 35.8–46.3)
HGB BLD-MCNC: 10.8 G/DL (ref 11.7–15.4)
MCH RBC QN AUTO: 31.3 PG (ref 26.1–32.9)
MCHC RBC AUTO-ENTMCNC: 34.5 G/DL (ref 31.4–35)
MCV RBC AUTO: 90.7 FL (ref 79.6–97.8)
NRBC # BLD: 0 K/UL (ref 0–0.2)
PLATELET # BLD AUTO: 118 K/UL (ref 150–450)
PMV BLD AUTO: 10.5 FL (ref 9.4–12.3)
POTASSIUM SERPL-SCNC: 3.9 MMOL/L (ref 3.5–5.1)
RBC # BLD AUTO: 3.45 M/UL (ref 4.05–5.2)
SERVICE CMNT-IMP: NORMAL
SODIUM SERPL-SCNC: 142 MMOL/L (ref 136–145)
WBC # BLD AUTO: 6.2 K/UL (ref 4.3–11.1)

## 2021-11-25 PROCEDURE — 85027 COMPLETE CBC AUTOMATED: CPT

## 2021-11-25 PROCEDURE — 80048 BASIC METABOLIC PNL TOTAL CA: CPT

## 2021-11-25 PROCEDURE — 82962 GLUCOSE BLOOD TEST: CPT

## 2021-11-25 PROCEDURE — 36415 COLL VENOUS BLD VENIPUNCTURE: CPT

## 2021-11-25 PROCEDURE — 99238 HOSP IP/OBS DSCHRG MGMT 30/<: CPT | Performed by: INTERNAL MEDICINE

## 2021-11-25 PROCEDURE — 74011250637 HC RX REV CODE- 250/637: Performed by: INTERNAL MEDICINE

## 2021-11-25 RX ADMIN — CLOPIDOGREL BISULFATE 75 MG: 75 TABLET ORAL at 08:36

## 2021-11-25 RX ADMIN — DULOXETINE HYDROCHLORIDE 30 MG: 30 CAPSULE, DELAYED RELEASE ORAL at 08:36

## 2021-11-25 RX ADMIN — ACETAMINOPHEN 650 MG: 325 TABLET ORAL at 08:36

## 2021-11-25 RX ADMIN — Medication 1 AMPULE: at 08:36

## 2021-11-25 RX ADMIN — ASPIRIN 81 MG: 81 TABLET, CHEWABLE ORAL at 08:36

## 2021-11-25 RX ADMIN — GABAPENTIN 600 MG: 300 CAPSULE ORAL at 08:36

## 2021-11-25 RX ADMIN — MEMANTINE HYDROCHLORIDE 10 MG: 5 TABLET ORAL at 08:36

## 2021-11-25 NOTE — DISCHARGE INSTRUCTIONS
HOME INSTRUCTION FOLLOWING TRANSCATHETER AORTIC VALVE REPLACEMENT (TAVR)    What is my main problem? 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 Below is what you need to do when you go home. What do I need to do? ACTIVITY:  · Weigh yourself every day in the morning after using the bathroom. · Get up and get dressed every day. Do not stay in bed. · Set a daily routine.  Have someone stay with you for the first 5 days, do not be alone for long periods of time  · NO DRIVING For 1 WEEK. You may ride in a car. · Please let your doctor know if you need to leave town before your first follow-up visit. · Do NOT lift more than five to ten pounds, No Straining, Stooping, or Squatting for two weeks. · You may walk up and down a few steps but don't do a lot of stairs  · Walk as much as you can.  When you are tired, rest.  · Cough and deep breathe, and use your incentive spirometer 10 times each hour when you are awake. DIET:  · We recommend the Cardiac diet. Your nurse can provide a printed handout to you on this diet. INCISION CARE:  · Shower every day. You may shower after you go home   No tub baths for the first week you are at home. · Cleanse wounds with mild soap and water. Keep wounds dry. · A small amount of bloody or clear drainage is normal.  · Watch for signs of infection: redness, incision hot to the touch, fever greater than 101 degrees, swelling at the groin incision site, or discolored drainage from your incision. MEDICATIONS:  · DO NOT STOP TAKING YOUR MEDICINES WITHOUT SPEAKING WITH YOUR CARDIOLOGIST! · Take your pills as ordered at the time of discharge. · Do  not stop taking any medicine without first discussing it with your doctor. · Avoid all over the counter medications, herbal, and natural supplements unless you have discussed them with your doctor.   · It is important to follow your doctor's orders, especially if blood thinning drugs are prescribed. Your doctor will monitor your medicine and advise you when or if you can stop taking it. Why is it important for me to do this? · Regular check-ups by your cardiologist are very important. It is easier for patients with a heart valve replacement to get infections, which could lead to future heart damage. · Before any dental work or surgery is done, tell your dentist or surgeon about your heart valve replacement. Wait for 3-4 months to have any dental work completed. You may need to take antibiotics before and after some medical procedures to reduce the risk of infection. · Always tell the doctor (or medical technician) that you have an implanted heart valve. · Before an MRI (magnetic resonance imaging) procedure, always notify the doctor (or medical technician) that you have an implanted heart valve. What can I expect? HEALTHY HABITS: To maintain a healthy physical condition, we have the following suggestions:  · No smoking!!! If you need help to stop smoking, please contact your doctor. · Attempt to achieve and maintain your ideal body weight. · Walking is great exercise for the body and the mind! We suggest that you walk as much as you can. When to call the doctor or speak with the nurse? · Weight gain of 3 pounds in one day or 5 pounds in one week. Weight gain is NOT normal.  · Swelling of the legs, ankles, or feet  · Increasing shortness of breath  · Change in the color or temperature of your lower legs and feet. · Develop abdominal pain or unrelieved nausea and/or vomiting. · Develop any numbness, tingling, or limited movement of your arms or legs. · Signs of infection: redness, incision hot to the touch, fevers greater than 101 degrees, or colored drainage from your incision. · Seroma is an accumulation of fluid in the tissues at the groin surgical site.  Symptoms may include: a lump near the groin surgical site, clear fluid draining from the site, redness, warmth, or swelling. ADDITIONAL INFORMATION:  · Your follow-up echocardiogram has been scheduled along with your 30 day TAVR follow up visit. On this visit you will also have nonfasting lab work drawn at the office. This visit is very important for you, so be sure to attend this visit. We are here for you! If you have any questions, please feel free to call us. Office numbers are:    Dr. Chai Singh  [phone]                   Valve Coordinator- Glo Jimenez - [phone]          Patient Education        Transcatheter Aortic Valve Implantation (ISA): What to Expect at Home  Your Recovery     Transcatheter aortic valve implantation (ISA) is a procedure that implants a replacement aortic valve in the heart. Your doctor used a catheter to put in your new heart valve. After the procedure, you may spend up to a few days in the hospital.  Most people can return to regular activities in about 2 weeks. Your doctor may give you specific instructions on when you can do your normal activities again. Your doctor may suggest that you attend a cardiac rehab program. In cardiac rehab, a team of health professionals provides education and support to help you recover and prevent problems with your heart. Ask your doctor if rehab is right for you. This care sheet gives you a general idea about how long it will take for you to recover. But each person recovers at a different pace. Follow the steps below to feel better as quickly as possible. How can you care for yourself at home? Activity    · Do not do strenuous exercise and do not lift, pull, or push anything heavy until your doctor says it is okay.  This may be for several days.     · If the catheter was placed in your groin, try not to walk up stairs for the first couple of days.     · Rest when you feel tired.     · Ask your doctor when you can drive again. Diet    · Eat a heart-healthy diet. If you have not been eating this way, talk to your doctor. You also may want to talk to a dietitian. They can help you learn about healthy foods.     · If your bowel movements are not regular right after the procedure, try to avoid constipation and straining. Drink plenty of water. Your doctor may suggest fiber, a stool softener, or a mild laxative. Medicines    · Your doctor will tell you if and when you can restart your medicines. You will also get instructions about taking any new medicines.     · If you take aspirin or some other blood thinner, ask your doctor if and when to start taking it again. Make sure that you understand exactly what your doctor wants you to do.     · Your doctor may prescribe aspirin or some other blood-thinning medicine. Be sure to get instructions about how to take your medicine safely. Blood thinners can cause serious bleeding problems.     · Be safe with medicines. Take your medicines exactly as prescribed. Call your doctor if you think you are having a problem with your medicine. Care of the catheter site    · For 1 or 2 days, keep a bandage over the spot where the catheter was inserted. The bandage probably will fall off in this time.     · Put ice or a cold pack on the area for 10 to 20 minutes at a time to help with soreness or swelling. Put a thin cloth between the ice and your skin.     · You may shower 24 to 48 hours after the procedure, if your doctor okays it. Pat the incision dry.     · Do not soak the catheter site until it is healed. Don't take a bath for 1 week, or until your doctor tells you it is okay.     · Watch for bleeding from the site. A small amount of blood (up to the size of a quarter) on the bandage can be normal.     · If you are bleeding, lie down and press on the area for 15 minutes to try to make it stop.  If the bleeding does not stop, call your doctor or seek immediate medical care. Other    · Practice good dental hygiene and have regular checkups. Good dental health is especially important. That's because bacteria can spread from teeth and gums to the heart valves.     · Be sure to tell all of your doctors and your dentist that you have a replacement aortic valve. This is important because you may need to take antibiotics before certain procedures to prevent infection. Follow-up care is a key part of your treatment and safety. Be sure to make and go to all appointments, and call your doctor if you are having problems. It's also a good idea to know your test results and keep a list of the medicines you take. When should you call for help? Call 911 anytime you think you may need emergency care. For example, call if:    · You passed out (lost consciousness).     · You have symptoms of a stroke. These may include:  ? Sudden numbness, tingling, weakness, or loss of movement in your face, arm, or leg, especially on only one side of your body. ? Sudden vision changes. ? Sudden trouble speaking. ? Sudden confusion or trouble understanding simple statements. ? Sudden problems with walking or balance. ? A sudden, severe headache that is different from past headaches.     · You have symptoms of a heart attack. These may include:  ? Chest pain or pressure, or a strange feeling in the chest.  ? Sweating. ? Shortness of breath. ? Nausea or vomiting. ? Pain, pressure, or a strange feeling in the back, neck, jaw, or upper belly or in one or both shoulders or arms. ? A fast or irregular heartbeat. After you call 911, the  may tell you to chew 1 adult-strength or 2 to 4 low-dose aspirin. Wait for an ambulance. Do not try to drive yourself.    Call your doctor now or seek immediate medical care if:    · You are bleeding from the area where the catheter was put in your artery.     · You have a fast-growing, painful lump at the catheter site.     · You have signs of infection, such as:  ? Increased pain, swelling, warmth, or redness. ? Red streaks leading from the catheter site. ? Pus draining from the catheter site. ? A fever.     · Your leg is painful, looks blue, or feels cold, numb, or tingly. Watch closely for changes in your health, and be sure to contact your doctor if you have any problems. Where can you learn more? Go to http://www.gray.com/  Enter P870 in the search box to learn more about \"Transcatheter Aortic Valve Implantation (ISA): What to Expect at Home. \"  Current as of: April 29, 2021               Content Version: 13.0  © 5359-9039 [employer]. Care instructions adapted under license by [employer] (which disclaims liability or warranty for this information). If you have questions about a medical condition or this instruction, always ask your healthcare professional. [place] [address] any warranty or liability for your use of this information. Patient Education        Heart-Healthy Diet: Care Instructions  Your Care Instructions     A heart-healthy diet has lots of vegetables, fruits, nuts, beans, and whole grains, and is low in salt. It limits foods that are high in saturated fat, such as meats, cheeses, and fried foods. It may be hard to change your diet, but even small changes can lower your risk of heart attack and heart disease. Follow-up care is a key part of your treatment and safety. Be sure to make and go to all appointments, and call your doctor if you are having problems. It's also a good idea to know your test results and keep a list of the medicines you take. How can you care for yourself at home? Watch your portions  · Use food labels to learn what the recommended servings are for the foods you eat. · Eat only the number of calories you need to stay at a healthy weight.  If you need to lose weight, eat fewer calories than your body burns (through exercise and other physical activity). Eat more fruits and vegetables  · Eat a variety of fruit and vegetables every day. Dark green, deep orange, red, or yellow fruits and vegetables are especially good for you. Examples include spinach, carrots, peaches, and berries. · Keep carrots, celery, and other veggies handy for snacks. Buy fruit that is in season and store it where you can see it so that you will be tempted to eat it. · Cook dishes that have a lot of veggies in them, such as stir-fries and soups. Limit saturated fat  · Read food labels, and try to avoid saturated fats. They increase your risk of heart disease. · Use olive or canola oil when you cook. · Bake, broil, grill, or steam foods instead of frying them. · Choose lean meats instead of high-fat meats such as hot dogs and sausages. Cut off all visible fat when you prepare meat. · Eat fish, skinless poultry, and meat alternatives such as soy products instead of high-fat meats. Soy products, such as tofu, may be especially good for your heart. · Choose low-fat or fat-free milk and dairy products. Eat foods high in fiber  · Eat a variety of grain products every day. Include whole-grain foods that have lots of fiber and nutrients. Examples of whole-grain foods include oats, whole wheat bread, and brown rice. · Buy whole-grain breads and cereals, instead of white bread or pastries. Limit salt and sodium  · Limit how much salt and sodium you eat to help lower your blood pressure. · Taste food before you salt it. Add only a little salt when you think you need it. With time, your taste buds will adjust to less salt. · Eat fewer snack items, fast foods, and other high-salt, processed foods. Check food labels for the amount of sodium in packaged foods. · Choose low-sodium versions of canned goods (such as soups, vegetables, and beans). Limit sugar  · Limit drinks and foods with added sugar. These include candy, desserts, and soda pop.   Limit alcohol  · Limit alcohol to no more than 2 drinks a day for men and 1 drink a day for women. Too much alcohol can cause health problems. When should you call for help? Watch closely for changes in your health, and be sure to contact your doctor if:    · You would like help planning heart-healthy meals. Where can you learn more? Go to http://www.clifton.com/  Enter V137 in the search box to learn more about \"Heart-Healthy Diet: Care Instructions. \"  Current as of: December 17, 2020               Content Version: 13.0  © 2006-2021 [employer]. Care instructions adapted under license by [employer] (which disclaims liability or warranty for this information). If you have questions about a medical condition or this instruction, always ask your healthcare professional. [address] any warranty or liability for your use of this information.

## 2021-11-25 NOTE — ROUTINE PROCESS
Discharge instructions, follow up appointments and prescriptions reviewed with patient and family. Both verbalize understanding. All personal belongings taken with patient. Family member will drive patient home. Patient escorted to discharge area via wheelchair. Patient is stable at discharge. IVs and monitor removed.

## 2021-11-25 NOTE — DISCHARGE SUMMARY
[facility] Discharge Summary     Patient ID:  Tommy Grjaleesa  [id number]  34 y.o.  1940    Admit date: 11/23/2021    Discharge date: 11/25/21    Admitting Physician: Sierra Ramos MD     Discharge Physician: Dr. Zhane Tomas    Admission Diagnoses: Aortic valve stenosis, etiology of cardiac valve disease unspecified [I35.0]  Aortic valve stenosis [I35.0]  Aortic stenosis [I35.0]    Discharge Diagnoses:    Diagnosis    S/p TAVR (transcatheter aortic valve replacement), bioprosthetic    Aortic valve stenosis    Coronary artery disease involving native coronary artery of native heart       Cardiology Procedures this admission:  Transcatheter aortic valve replacement and echocardiogram     Consults: None    Hospital Course: Patient was seen at the office of [facility] by Dr. Zhane Tomas in follow up for severe aortic stenosis. The patient was felt to be an appropriate candidate for TAVR. She was admitted for planned TAVR. The patient underwent successful balloon valvuloplasty and transcatheter aortic valve replacement with a 26mm Medtronic Evolut Pro valve. The patient was monitored closely in the CVICU. The morning of 11/25/21,  the patient was up feeling well without any complaints of chest pain or shortness of breath. Patient's labs were stable. Patient's bilateral femoral sites without any bleeding, bruising or hematoma. An echocardiogram showed:   · LV: Estimated LVEF is 60 - 65%. Normal cavity size and systolic function (ejection fraction normal). Mild concentric hypertrophy. Left ventricular diastolic dysfunction. · AV: Prosthetic aortic valve. Aortic valve mean gradient is 8 mmHg. There is a prosthetic aortic valve from prior TAVR procedure. Prosthetic valve insufficiency is present. There is mild paravalvular leaking. · LA: Moderately dilated left atrium. · TV: Right Ventricular Arterial Pressure (RVSP) is 23 mmHg.   · MV: Mild mitral annular calcification. Patient was seen and examined by Dr. Zhane Tomas and determined stable and ready for discharge. Patient was instructed on the importance of medication compliance including dual anti-platelet therapy for at least 6 months. The patient will have transitional care follow up with [facility] Dr. Zhane Tomas on 12/8/21 in the [facility] office. A repeat echocardiogram s/p TAVR is scheduled for 1/12/22 and CBC/BMP will be done prior to that appt. The patient will see Dr. Zhane Tomas for 30 day follow up appt on 1/12/22. The patient was referred to cardiac rehab as well. DISPOSITION: The patient is being discharged home in stable condition on a low saturated fat, low cholesterol and low salt diet. The patient is instructed to advance activities as tolerated to the limit of fatigue or shortness of breath. The patient is instructed to avoid lifting anything heavier than 10 lbs for 2 weeks. The patient is instructed to avoid any straining, stooping or squatting for 2 weeks. The patient is instructed not to drive for 1 week. The patient is instructed to watch the groin site for bleeding/oozing; if seen, the patient is instructed to apply firm pressure with a clean cloth and call [facility] at [phone]. The patient is instructed to watch for signs of infection which include: increasing area of redness, fever/hot to touch or purulent drainage at the groin site. The patient is instructed not to soak in a bathtub for 7-10 days, but is cleared to shower. The patient is instructed to return to the ER immediately for any severe pain, color change, or temperature change in leg. The patient is informed that prophylaxis for bacterial endocarditis is recommended for high-risk procedures such a all dental procedures that involve manipulation of gingival tissue, the periapical region of teeth, or perforation of the oral mucosa.       Discharge Exam: Patient has been seen by Dr. Zhane Tomas: see his progress note for exam details.     Visit Vitals  BP (!) 114/58   Pulse 84   Temp 98.9 °F (37.2 °C)   Resp 16   Ht 5' (1.524 m)   Wt 71.7 kg (158 lb)   SpO2 98%   BMI 30.86 kg/m²       Recent Results (from the past 24 hour(s))   METABOLIC PANEL, BASIC    Collection Time: 11/24/21  3:07 AM   Result Value Ref Range    Sodium 141 136 - 145 mmol/L    Potassium 3.7 3.5 - 5.1 mmol/L    Chloride 108 (H) 98 - 107 mmol/L    CO2 25 21 - 32 mmol/L    Anion gap 8 7 - 16 mmol/L    Glucose 95 65 - 100 mg/dL    BUN 23 8 - 23 MG/DL    Creatinine 0.76 0.6 - 1.0 MG/DL    GFR est AA >60 >60 ml/min/1.73m2    GFR est non-AA >60 >60 ml/min/1.73m2    Calcium 8.8 8.3 - 10.4 MG/DL   CBC W/O DIFF    Collection Time: 11/24/21  3:07 AM   Result Value Ref Range    WBC 7.4 4.3 - 11.1 K/uL    RBC 3.56 (L) 4.05 - 5.2 M/uL    HGB 10.7 (L) 11.7 - 15.4 g/dL    HCT 31.2 (L) 35.8 - 46.3 %    MCV 87.6 79.6 - 97.8 FL    MCH 30.1 26.1 - 32.9 PG    MCHC 34.3 31.4 - 35.0 g/dL    RDW 11.5 (L) 11.9 - 14.6 %    PLATELET 582 (L) 162 - 450 K/uL    MPV 10.2 9.4 - 12.3 FL    ABSOLUTE NRBC 0.00 0.0 - 0.2 K/uL   MAGNESIUM    Collection Time: 11/24/21  3:07 AM   Result Value Ref Range    Magnesium 2.1 1.8 - 2.4 mg/dL   GLUCOSE, POC    Collection Time: 11/24/21  6:19 AM   Result Value Ref Range    Glucose (POC) 82 65 - 100 mg/dL    Performed by Claudene Providence    ECHO ADULT COMPLETE    Collection Time: 11/24/21  8:45 AM   Result Value Ref Range    Left Atrium Minor Axis 3.57 cm    Left Atrium Major [place] 6.04 cm    LA Area 2C 20.70 cm2    LA Area 4C 22.50 cm2    LV ED Vol A4C 48.90 cm3    LV ES Vol A4C 14.50 cm3    IVSd 1.03 (A) 0.6 - 0.9 cm    LVIDd 3.76 (A) 3.9 - 5.3 cm    LVIDs 2.49 cm    LVOT d 1.87 cm    LVOT VTI 28.00 cm    LVOT Peak Gradient 6.00 mmHg    LVPWd 1.08 (A) 0.6 - 0.9 cm    LV E' Lateral Velocity 5.32 cm/s    LV E' Septal Velocity 4.93 cm/s    Aortic Valve Systolic Mean Gradient 25.43 mmHg    AoV VTI 47.30 cm    Aortic Valve Systolic Peak Velocity 239.00 cm/s    AoV PG 23.00 mmHg    Aortic Valve Area by Continuity of VTI 1.63 cm2    Aortic Valve Area by Continuity of Peak Velocity 1.42 cm2    AO ASC D 2.82 cm    Mitral Valve E Wave Deceleration Time 264.00 ms    MV A Leonides 149.00 cm/s    MV E Leonides 111.00 cm/s    MV Mean Gradient 4.00 mmHg    Mitral Valve Annulus Velocity Time Integral 47.00 cm    Mitral Valve Max Velocity 174.00 cm/s    MV Peak Gradient 12.00 mmHg    Est. RA Pressure 3.00 mmHg    RVIDd 2.71 cm    Tapse 2.05 (A) 1.5 - 2.0 cm    TR Max Velocity 224.00 cm/s    Triscuspid Valve Regurgitation Peak Gradient 20.00 mmHg    MV E/A 0.74     LV Mass .7 67 - 162 g    LV Mass AL Index 73.8 43 - 95 g/m2    E/E' lateral 20.86     E/E' septal 22.52     RVSP 23.0 mmHg    E/E' ratio (averaged) 21.69     MARTIN/BSA Pk Leonides 0.8 cm2/m2    MARTIN/BSA VTI 1.0 cm2/m2   GLUCOSE, POC    Collection Time: 11/24/21 11:21 AM   Result Value Ref Range    Glucose (POC) 132 (H) 65 - 100 mg/dL    Performed by Gagandeep    GLUCOSE, POC    Collection Time: 11/24/21  3:02 PM   Result Value Ref Range    Glucose (POC) 173 (H) 65 - 100 mg/dL    Performed by Lavon    GLUCOSE, POC    Collection Time: 11/24/21  8:08 PM   Result Value Ref Range    Glucose (POC) 174 (H) 65 - 100 mg/dL    Performed by Eddi Trinh          Patient Instructions:   Current Discharge Medication List      CONTINUE these medications which have NOT CHANGED    Details   linaCLOtide (Linzess) 145 mcg cap capsule Take 145 mcg by mouth Daily (before breakfast). memantine (NAMENDA) 10 mg tablet Take 1 Tablet by mouth two (2) times a day for 90 days. Qty: 180 Tablet, Refills: 0    Associated Diagnoses: Memory loss      aspirin 81 mg chewable tablet Take 1 Tablet by mouth daily. atorvastatin (LIPITOR) 10 mg tablet Take 1 Tablet by mouth nightly. Qty: 30 Tablet, Refills: 11      clopidogreL (PLAVIX) 75 mg tab Take 1 Tablet by mouth daily.   Qty: 90 Tablet, Refills: 3      hydrOXYzine HCL (ATARAX) 25 mg tablet 1-2 every 8-12hours prn anxiety/sleep  Qty: 60 Tablet, Refills: 1    Associated Diagnoses: Situational anxiety      DULoxetine (CYMBALTA) 30 mg capsule Take 1 Capsule by mouth daily. 1 per day for mood  Qty: 90 Capsule, Refills: 1    Comments: Please have patient call office for follow up and for future refills  Associated Diagnoses: Memory loss; Recurrent depression ([facility])      empagliflozin-linagliptin (Glyxambi) 25-5 mg tab 1 per day for diabetes  Qty: 90 Tablet, Refills: 3    Associated Diagnoses: Type 2 diabetes mellitus with diabetic autonomic neuropathy, with long-term current use of insulin ([facility])      gabapentin (NEURONTIN) 600 mg tablet 1 tid for neuropathy  Qty: 90 Tablet, Refills: 2    Associated Diagnoses: Neuropathy      insulin detemir U-100 (Levemir FlexTouch U-100 Insuln) 100 unit/mL (3 mL) inpn 50 Units by SubCUTAneous route nightly for 90 days. 50 units q pm  Dispense 15 pens  Indications: type 2 diabetes mellitus  Qty: 45 mL, Refills: 3    Associated Diagnoses: Type 2 diabetes mellitus with diabetic autonomic neuropathy, with long-term current use of insulin ([facility])      omeprazole (PRILOSEC) 20 mg capsule For heartburn/reflux  TAKE 1 CAPSULE BY MOUTH EVERY DAY  Qty: 90 Capsule, Refills: 3    Comments: Patient needs to call office to schedule next appt for next refill. Associated Diagnoses: Gastroesophageal reflux disease without esophagitis      calcium carbonate-vitamin D3 (Caltrate 600 plus D) 600 mg (1,500 mg)-800 unit chew Take 1 Tab by mouth daily. Indications: osteoporosis, a condition of weak bones  Qty: 90 Tab, Refills: 3    Associated Diagnoses: Osteopenia, unspecified location      ProAir HFA 90 mcg/actuation inhaler Take 1 Puff by inhalation three (3) times daily as needed for Wheezing or Shortness of Breath.   Qty: 8.5 Inhaler, Refills: 3    Associated Diagnoses: Simple chronic bronchitis ([facility])      polyethylene glycol (MIRALAX) 17 gram/dose powder Take 17 g by mouth two (2) times a day. Indications: constipation  Qty: 510 g, Refills: 1    Associated Diagnoses: Chronic idiopathic constipation      glucose blood VI test strips (BLOOD GLUCOSE TEST) strip One touch verio or other if other glucometer dispensed to test 5 times daily  Qty: 100 Strip, Refills: 11    Associated Diagnoses: Type 2 diabetes mellitus with diabetic autonomic neuropathy, with long-term current use of insulin ([facility])      OTHER One touch verio glucometer  Ok to dispense other if not formulary  Qty: 1 Each, Refills: 0    Associated Diagnoses: Type 2 diabetes mellitus with diabetic autonomic neuropathy, with long-term current use of insulin ([facility])      Lancets misc To be used with dispensed glucometer to test 5 times daily  Qty: 100 Each, Refills: 11    Associated Diagnoses: Type 2 diabetes mellitus with diabetic autonomic neuropathy, with long-term current use of insulin ([facility])      Blood-Glucose Meter monitoring kit One touch verio glucometer Dispense other if formulary requiresDispense 100 ea lancets/test stripsCall for questions  Qty: 1 Kit, Refills: 0    Associated Diagnoses: Type 2 diabetes mellitus with diabetic autonomic neuropathy, with long-term current use of insulin ([facility])      nitroglycerin (NITROSTAT) 0.4 mg SL tablet 1 Tablet by SubLINGual route every five (5) minutes as needed for Chest Pain for up to 3 doses. Up to 3 doses.   Qty: 30 Tablet, Refills: 11

## 2021-11-27 LAB
ABO + RH BLD: NORMAL
BLD PROD TYP BPU: NORMAL
BLOOD GROUP ANTIBODIES SERPL: NORMAL
BPU ID: NORMAL
CROSSMATCH RESULT,%XM: NORMAL
SPECIMEN EXP DATE BLD: NORMAL
STATUS OF UNIT,%ST: NORMAL
UNIT DIVISION, %UDIV: 0

## 2021-12-09 DIAGNOSIS — Z95.3 S/P TAVR (TRANSCATHETER AORTIC VALVE REPLACEMENT), BIOPROSTHETIC: Primary | ICD-10-CM

## 2021-12-29 ENCOUNTER — TELEPHONE (OUTPATIENT)
Dept: PHARMACY | Age: 81
End: 2021-12-29

## 2021-12-29 NOTE — TELEPHONE ENCOUNTER
CLINICAL PHARMACY: ADHERENCE REVIEW  Identified care gap per Marielle; fills at [facility]: Diabetes and Statin adherence    Last Visit: 12/8/21 cardiology    Patient identified as LIS = 1, therefore patient may be able to receive a 90-day supply for the same cost as a 30-day supply    Patient also appears to be prescribed: DM regimen including insulin    ASSESSMENT  DIABETES ADHERENCE    Per Insurance Records through 12/26/21 (2020 [facility] = n/a; YTD PDC = 79%; Potential Fail Date: IMPOSSIBLE in 2021):   Glyxambi 25-5 mg tab last filled on 10/18/21 for 90 day supply. Next refill due: 1/16/22  See 10/13/21 refill encounter, had to order medication in stock - perhaps explains later claim date? Pt has also been filling Levemir insulin, but seems not adjudicating through [facility] insurance    Per Reconciled Dispense Report: 6/12/21 to 12/8/21  GLYXAMBI 25 MG-5 MG TABLET 10/18/2021 90 90 Each BARRINGTON CASTELLON [facility]/pharmacy #[id number]- P. .. LEVEMIR FLEXTOUCH  100 UNIT/ML SOPN 09/16/2021 90 45 mL BARRINGTON CASTELLON [facility]/pharmacy #[id number]- P. .. Per [facility] pharmacy:  - Levemir not filled at their store since 6/2020? Rx on profile from 9/9/21 - Rx processed, $9.20 for 3 boxes    Lab Results   Component Value Date/Time    Hemoglobin A1c 9.9 (H) 11/22/2021 08:22 AM    Hemoglobin A1c 9.3 (H) 02/04/2020 11:40 AM    Hemoglobin A1c 9.3 (H) 05/06/2019 11:39 AM    Hemoglobin A1c (POC) 9.5 (A) 03/28/2017 09:11 AM    Hemoglobin A1c (POC) 9.2 (A) 12/14/2016 10:56 AM    Hemoglobin A1c, External 8.3 04/07/2015 12:00 AM     NOTE A1c >9% - per 10/7/21 OV family self-adjusted medication, stopped short acting insulin    STATIN ADHERENCE    Per Insurance Records through 12/26/21 (2020 [facility] = n/a; YTD PDC = filled only once): Atorvastatin 10 mg tab last filled on 10/2/21 for 90 day supply. Next refill due: 12/31/21    Per Reconciled Dispense Report: 6/12/21 to 12/8/21  ATORVASTATIN 10 MG TABLET 10/02/2021 90 90 Each JULIA RANGEL [facility]/pharmacy #[id number]- P. .. Per [facility]:   Atorvastatin ready for  $2.54. Lab Results   Component Value Date/Time    Cholesterol, total 192 10/01/2021 04:45 AM    HDL Cholesterol 48 10/01/2021 04:45 AM    LDL, calculated 105 (H) 10/01/2021 04:45 AM    VLDL, calculated 39 (H) 10/01/2021 04:45 AM    Triglyceride 195 (H) 10/01/2021 04:45 AM    CHOL/HDL Ratio 4.0 10/01/2021 04:45 AM     ALT (SGPT)   Date Value Ref Range Status   11/22/2021 15 12 - 65 U/L Final     AST (SGOT)   Date Value Ref Range Status   11/22/2021 8 (L) 15 - 37 U/L Final     The ASCVD Risk score (Lucía Yip, et al., 2013) failed to calculate for the following reasons: The 2013 ASCVD risk score is only valid for ages 36 to 78     PLAN  The following are interventions that have been identified:  - Atorvastatin @due now, ready for  at [address] [place] [address]? Pharmacy previously said was filling but today said has not been picked up since 6/2020? Attempting to reach patient to review.  Left message asking for return call.       Future Appointments   Date Time Provider Jero Lauren   1/11/2022 11:00 AM Bisi Gaona MD [facility]   1/12/2022  8:30 AM ECHO [facility]   1/12/2022  9:45 AM Valeria Salinas MD [facility]       Ladan Palencia, PharmD, [facility]  Department, toll free: [phone]

## 2022-01-01 RX ORDER — GABAPENTIN 600 MG/1
TABLET ORAL
Qty: 90 TABLET | Refills: 1 | Status: SHIPPED | OUTPATIENT
Start: 2022-01-01 | End: 2023-05-21

## 2022-01-01 RX ORDER — AMITRIPTYLINE HYDROCHLORIDE 25 MG/1
TABLET, FILM COATED ORAL
COMMUNITY
Start: 2022-01-01 | End: 2022-01-01 | Stop reason: SDUPTHER

## 2022-01-01 RX ORDER — OMEPRAZOLE 20 MG/1
20 CAPSULE, DELAYED RELEASE ORAL DAILY
Qty: 90 CAPSULE | Refills: 1 | Status: SHIPPED | OUTPATIENT
Start: 2022-01-01

## 2022-01-01 RX ORDER — AMITRIPTYLINE HYDROCHLORIDE 25 MG/1
TABLET, FILM COATED ORAL
Qty: 60 TABLET | Refills: 1 | Status: SHIPPED | OUTPATIENT
Start: 2022-01-01

## 2022-01-05 NOTE — TELEPHONE ENCOUNTER
Reached pt's  to review. States has not yet picked up atorvastatin but intends to do so, aware is ready at pharmacy. Discussed Levemir use - states is still using, had large surplus of Levemir in refrigerator. On last box of Levemir now so intending to  when at pharmacy for other Rxs. Pt has been using Levemir 15 units BID, Glyxambi once daily. Blood sugars \"better than before\" but still in 300s (previuosly had been in 500-600s). Having trouble minimizing CHO intake (sweets).  tries to hide or not buy but neighbor will bring things over. Edu provided. Has upcoming PCP.     For Pharmacy [id number] in place: No   Recommendation Provided To: Patient/Caregiver: 2 via Telephone and Pharmacy: 2   Intervention Detail: Adherence Monitorin   Gap Closed?: Yes   Intervention Accepted By: Patient/Caregiver: 2 and Pharmacy: 2   Time Spent (min): 20

## 2022-01-17 ENCOUNTER — TELEPHONE (OUTPATIENT)
Dept: CASE MANAGEMENT | Age: 82
End: 2022-01-17

## 2022-01-17 NOTE — TELEPHONE ENCOUNTER
Spoke with spouse, Abdiel Nazario, who states there was a conflict in schedule for the 1/12 appt where they were a no show for the 30 day FU for the pt. Echo and OV rescheduled and encouraged attendance. Pt remains on her ASA and plavix, spouse states pt \"feels much better and is breathing a lot better\" since her TAVR procedure.      Jaciel Adams, Structural Heart Navigator

## 2022-03-18 PROBLEM — F33.9 RECURRENT DEPRESSION (HCC): Status: ACTIVE | Noted: 2018-01-03

## 2022-03-18 PROBLEM — K57.90 DIVERTICULOSIS: Status: ACTIVE | Noted: 2020-12-08

## 2022-03-18 PROBLEM — I35.0 NONRHEUMATIC AORTIC VALVE STENOSIS: Status: ACTIVE | Noted: 2021-09-16

## 2022-03-19 PROBLEM — M65.9 FLEXOR TENOSYNOVITIS OF FINGER: Status: ACTIVE | Noted: 2017-09-29

## 2022-03-19 PROBLEM — E86.0 DEHYDRATION, MODERATE: Status: ACTIVE | Noted: 2017-09-28

## 2022-03-19 PROBLEM — Z91.81 AT HIGH RISK FOR FALLS: Status: ACTIVE | Noted: 2021-05-13

## 2022-03-19 PROBLEM — I25.10 CORONARY ARTERY DISEASE INVOLVING NATIVE CORONARY ARTERY OF NATIVE HEART: Status: ACTIVE | Noted: 2021-10-11

## 2022-03-19 PROBLEM — L03.012 CELLULITIS OF LEFT INDEX FINGER: Status: ACTIVE | Noted: 2017-09-28

## 2022-03-19 PROBLEM — Z95.3 S/P TAVR (TRANSCATHETER AORTIC VALVE REPLACEMENT), BIOPROSTHETIC: Status: ACTIVE | Noted: 2021-11-24

## 2022-03-19 PROBLEM — E11.21 TYPE 2 DIABETES WITH NEPHROPATHY (HCC): Status: ACTIVE | Noted: 2019-05-14

## 2022-03-19 PROBLEM — E55.9 VITAMIN D DEFICIENCY: Status: ACTIVE | Noted: 2018-01-03

## 2022-03-19 PROBLEM — Z91.14 NON COMPLIANCE W MEDICATION REGIMEN: Status: ACTIVE | Noted: 2017-06-09

## 2022-03-19 PROBLEM — Z91.148 NON COMPLIANCE W MEDICATION REGIMEN: Status: ACTIVE | Noted: 2017-06-09

## 2022-03-19 PROBLEM — Z98.61 POST PTCA: Status: ACTIVE | Noted: 2021-09-30

## 2022-03-19 PROBLEM — R01.1 MURMUR, CARDIAC: Status: ACTIVE | Noted: 2018-05-11

## 2022-03-20 PROBLEM — S61.259A CAT BITE OF FINGER: Status: ACTIVE | Noted: 2017-09-28
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
## 2022-03-20 PROBLEM — I35.0 AORTIC VALVE STENOSIS: Status: ACTIVE | Noted: 2021-11-23

## 2022-03-20 PROBLEM — G63 POLYNEUROPATHY ASSOCIATED WITH UNDERLYING DISEASE (HCC): Status: ACTIVE | Noted: 2017-09-29

## 2022-03-20 PROBLEM — Z91.81 AT MODERATE RISK FOR FALL: Status: ACTIVE | Noted: 2021-02-16

## 2022-05-09 ENCOUNTER — HOSPITAL ENCOUNTER (OUTPATIENT)
Dept: MAMMOGRAPHY | Age: 82
Discharge: HOME | End: 2022-05-09
Attending: CLINIC/CENTER
Payer: MEDICARE

## 2022-05-09 DIAGNOSIS — N63.15 MASS OVERLAPPING MULTIPLE QUADRANTS OF RIGHT BREAST: ICD-10-CM

## 2022-05-09 PROCEDURE — 76642 ULTRASOUND BREAST LIMITED: CPT

## 2022-05-09 PROCEDURE — 77066 DX MAMMO INCL CAD BI: CPT

## 2022-05-16 ENCOUNTER — HOSPITAL ENCOUNTER (OUTPATIENT)
Dept: MAMMOGRAPHY | Age: 82
Discharge: HOME | End: 2022-05-16
Attending: CLINIC/CENTER
Payer: MEDICARE

## 2022-05-16 VITALS — DIASTOLIC BLOOD PRESSURE: 70 MMHG | HEART RATE: 86 BPM | SYSTOLIC BLOOD PRESSURE: 161 MMHG

## 2022-05-16 DIAGNOSIS — R93.89 ABNORMAL ULTRASOUND: ICD-10-CM

## 2022-05-16 DIAGNOSIS — N63.10 BREAST MASS, RIGHT: ICD-10-CM

## 2022-05-16 DIAGNOSIS — R92.8 ABNORMAL MAMMOGRAM: ICD-10-CM

## 2022-05-16 PROCEDURE — 74011000250 HC RX REV CODE- 250: Performed by: CLINIC/CENTER

## 2022-05-16 PROCEDURE — 88334 PATH CONSLTJ SURG CYTO XM EA: CPT

## 2022-05-16 PROCEDURE — 77065 DX MAMMO INCL CAD UNI: CPT

## 2022-05-16 PROCEDURE — 88361 TUMOR IMMUNOHISTOCHEM/COMPUT: CPT

## 2022-05-16 PROCEDURE — 76942 ECHO GUIDE FOR BIOPSY: CPT

## 2022-05-16 PROCEDURE — 88305 TISSUE EXAM BY PATHOLOGIST: CPT

## 2022-05-16 PROCEDURE — 19083 BX BREAST 1ST LESION US IMAG: CPT

## 2022-05-16 PROCEDURE — 88333 PATH CONSLTJ SURG CYTO XM 1: CPT

## 2022-05-16 RX ORDER — LIDOCAINE HYDROCHLORIDE 10 MG/ML
10 INJECTION INFILTRATION; PERINEURAL
Status: COMPLETED | OUTPATIENT
Start: 2022-05-16 | End: 2022-05-16

## 2022-05-16 RX ADMIN — LIDOCAINE HYDROCHLORIDE 10 ML: 10 INJECTION, SOLUTION INFILTRATION; PERINEURAL at 10:15

## 2022-05-19 ENCOUNTER — NURSE NAVIGATOR (OUTPATIENT)
Dept: CASE MANAGEMENT | Age: 82
End: 2022-05-19

## 2022-05-19 NOTE — PROGRESS NOTES
5/19/2022  Breast Navigation  intake complete for New Patient Breast Cancer. Call to patient spoke with patient and spouse Elaina Severance. Patient has diagnosis of Dementia, on Namenda and spouse manages all of her medical care and decisions per patient and spouse. Patient gave full permission to discuss all care with spouse. Reviewed role of navigation, gave contact information for navigators, discussed pathology report, reviewed upcoming appointments. Patient is not employed outside of home. Barriers:  No financial, psychosocial,or transportation barriers noted. Lives with spouse Ahsan Art who is her primary support person and caretaker. Patients adult son lives in the home as well, just had colon and bladder removed for cancer. Family history of breast cancer:  Twin sister with breast cancer diagnosed age 79, daughter diagnosed with breast cancer, unsure of age. Type of cancer: Right breast IDC, low grade, with Right axilla macromet. MRI pending for 5-23-22. Social determinants of health and Depression screen complete. Confirmed PCP:  Cas Keith MD  Added MD and Navigator to treatment team   Appointment with Oncology: Dr. Nicolasa King 5/31/22 at 10:15. Appointment with Surgery: Dr. Spencer Cuadra 6-6-22 at 9:40 am.  Breast Multidisciplinary Conference presentation date:  pending for 6-2-22  Card mailed to patient/spouse  with navigation contact information and upcoming appointments. Routed note to PCP regarding intake and upcoming appointments. Of note, spouse is scheduled for heart surgery on 6-9-22 and notes will have several days in hospital.  Referral to social work to potentially assist with needs upcoming and potential assist with  HCPOA.

## 2022-05-23 ENCOUNTER — TELEPHONE (OUTPATIENT)
Dept: CASE MANAGEMENT | Age: 82
End: 2022-05-23

## 2022-05-23 NOTE — TELEPHONE ENCOUNTER
Patients spouse Nehemiah York called and states he wanted to reschedule patients appointment to later in June as he has heart surgery coming up June 9th and will be in [place] upcoming and will not be able to make the appointments already scheduled for patient. Will reschedule appointments to 3rd or 4th week in June per his requests. He states at this point the patient is reluctant to do any treatment but he will try to get her to the appointments. The patient has significant demential per spouse and they are undecided on pursuing plan. Asked if patient would tolerate MRI, he states unsure, will reschedule MRI if desired after oncology appt. .  Will reschedule appts and reach back out to patients spouse.   Mailed card to patient and spouse with upcoming appointments per their request.

## 2022-05-25 ENCOUNTER — CLINICAL DOCUMENTATION (OUTPATIENT)
Dept: CASE MANAGEMENT | Age: 82
End: 2022-05-25

## 2022-06-08 DIAGNOSIS — C50.911 MALIGNANT NEOPLASM OF RIGHT FEMALE BREAST, UNSPECIFIED ESTROGEN RECEPTOR STATUS, UNSPECIFIED SITE OF BREAST (HCC): Primary | ICD-10-CM

## 2022-06-09 DIAGNOSIS — C50.919 INFILTRATING DUCT CARCINOMA (HCC): Primary | ICD-10-CM

## 2022-06-09 DIAGNOSIS — C76.1 CANCER OF AXILLA (HCC): ICD-10-CM

## 2022-06-20 NOTE — PATIENT INSTRUCTIONS
Patient Instructions from Today's Visit    Reason for Visit:  New Patient Visit - IDC    Diagnosis Information:  [email or web address] net/about-us/asco-answers-patient-education-materials/dewh-hqqiwmj-gbmh-sheets  ASCO ANSWERS is a collection of oncologist-approved patient education materials developed by the [employer] of Clinical Oncology (ASCO) for people with cancer and their caregivers. Breast Cancer    What is breast cancer? Breast cancer begins when healthy breast cells change and grow out of control, usually forming a mass called a tumor. Breast cancer is the most common type of cancer diagnosed in women in the [facility] (excluding skin cancer). What are the parts of the breast?   Most of the breast is fatty tissue. However, it also contains a network of lobes that are made up of tiny, tube-like structures called lobules that contain milk glands. Tiny ducts connect the glands, lobules, and lobes, and carry milk from the lobes to the nipple. Most breast cancers begin in the cells lining the milk ducts and are called ductal carcinomas. The second most common type starts in the lobules and is called lobular carcinoma. What does stage mean? The stage is a way of describing where the cancer is located, how much the cancer has grown, and if or where it has spread. There are 5 stages for breast cancer: stage 0 (zero), which is called noninvasive cancer or ductal carcinoma in situ (DCIS), and stages I through IV (1 through 4). Descriptions and illustrations of these stages are available at www.cancer. net/breast.     How is breast cancer treated? The biology and behavior of a breast cancer affect the treatment plan, and every persons cancer is different.  Doctors consider many factors when recommending a treatment plan, including the cancers stage; the tumors human epidermal growth factor receptor 2 (HER2) status and the hormone receptor status, which includes estrogen receptors (ER) and progesterone receptors (FL); the presence of known mutations (changes) in breast cancer genes; and the womans age, general health, and whether she has experienced menopause. For earlier stages of cancer, surgery to remove the tumor and nearby lymph nodes usually is the first treatment. Additional treatment with chemotherapy, radiation therapy, hormonal therapy, or targeted therapy is usually given after surgery to lower the risk of the cancer returning. These treatments may also be given before surgery to shrink the size of the tumor. The treatment of cancer that has spread or come back after treatment depends on many factors. It can include the therapies listed above used in a different combination or at a different pace. When making treatment decisions, women may also consider a clinical trial; talk with your doctor about all treatment options. The side effects of breast cancer treatment can be reduced or managed with a variety of medications and the help of your health care team. This is called palliative care and is an important part of the overall treatment plan. How can I cope with breast cancer? Absorbing the news of a cancer diagnosis and communicating with your health care team are key parts of the coping process. Seeking support, organizing your health information, making sure all of your questions are answered, and participating in the decision-making process are other steps. Talk with your health care team about any concerns. Understanding your emotions and those of people close to you can be helpful in managing the diagnosis, treatment, and healing process. [address]. © 2004 AMERICAN SOCIETY OF CLINICAL ONCOLOGY. MADE AVAILABLE THROUGH   Questions to ask the health care team   Regular communication is important in making informed decisions about your health care.  Consider asking the following questions of your health care team:    What type of breast cancer do I have? Can you explain my pathology report (laboratory test results) to me? What stage is the breast cancer? What does this mean? What is the ER/CA status of the tumor? The HER2 status? What does this                       mean? Would you explain my treatment options? What clinical trials are available for me? Where are they located, and how do I                 find out more about them? What treatment plan do you recommend? Why? Should treatment before surgery be considered? What is the goal of each treatment? Is it to eliminate the cancer, help me feel                   better, or both? Who will be part of my treatment team, and what does each member do? How will this treatment affect my daily life? Will I be able to work, exercise, and                perform my usual activities? Will this treatment affect my ability to become pregnant or have children? What                can be done to preserve my fertility? What long-term side effects are associated with my cancer treatment? If Im worried about managing the costs of cancer care, who can help me? Where can I find emotional support for me and my family? Whom should I call with questions or problems? Is there anything else I should be asking? Find more questions to ask the health care team at [address] net/breast and www.cancer. net/metastaticbreast. For a digital list of questions, download Cancer. Nets free mobile lauren at www.cancer. net/lauren. The ideas and opinions expressed here do not necessarily reflect the opinions of the American Society of Clinical Oncology (ASCO) or [employer]. The information in this fact sheet is not intended as medical or legal advice, or as a substitute for consultation with a physician or other licensed health care provider.  Patients with health care-related questions should call or see their physician or other health care provider promptly and should not disregard professional medical advice, or delay seeking it, because of information encountered here. The mention of any product, service, or treatment in this fact sheet should not be construed as an ASCO endorsement. ASCO is not responsible for any injury or damage to persons or property arising out of or related to any use of ASCOs patient education materials, or to any errors or omissions. To order more printed copies, please call [phone] or visit www.cancer. net/estore. TERMS TO KNOW     Benign: A growth that is not cancerous     Biopsy: Removal of a small tissue sample that is examined under a microscope to check for cancer cells     Chemotherapy: The use of drugs to destroy cancer cells     DCIS: Ductal carcinoma in situ; cancer that has not spread past the ducts and is not invasive     Lymph node: A tiny, bean-shaped organ that fights infection     Lumpectomy: The surgical removal of the tumor and an area of healthy tissue around the tumor     Malignant: A cancerous growth or mass     Mastectomy: Surgical removal of the entire breast     Metastasis: The spread of cancer to another part of the body, usually to another organ     Oncologist: A doctor who specializes in treating cancer     Radiation therapy: The use of high-energy x-rays to destroy cancer cells     Tumor: An abnormal growth of body tissue     [address] [facility], [address] [place] [address], [address] [place] [address]  Toll Free: [phone]  Phone: [phone] www. [employer]. [employer]  www.[employer]. org © 2017 American Society of Clinical Oncology. For permissions information, contact [email or web address].   [id number]       Plan:  - We recommend having the surgery first.   - After surgery and results are reviewed from the final pathology we will likely recommend that you have radiation therapy and then take an oral medication daily that blocks estrogen, that you will take for about 5 years.    - At this time we are hopeful that you will not need chemotherapy, but the final determination is dependent upon the pathology after surgery. Follow Up:  - 6-8 weeks        Treatment Summary has been discussed and given to patient: n/a        -------------------------------------------------------------------------------------------------------------------  Please call our office at [phone] if you have any  of the following symptoms:   · Fever of 100.5 or greater  · Chills  · Shortness of breath  · Swelling or pain in one leg    After office hours an answering service is available and will contact a provider for emergencies or if you are experiencing any of the above symptoms.  Patient has not express an interest in My Chart. My Chart log in information explained on the after visit summary printout at the [facility] [address] desk.     Leeanne Kruger RN

## 2022-06-23 NOTE — PROGRESS NOTES
Gildardo [facility] Abstract      Reason for Referral:  Breast cancer    Referring Provider:  Dr. Candelario Cranker, [facility]    Primary Care Provider:  Dr. Candelario Cranker, [facility]    Family History of Cancer/Hematologic Disorders:  twin sister with breast cancer age 72, daughter breast cancer age 62, son with bladder cancer, paternal grandfather with head and neck cancer, maternal aunt with brain tumor, paternal aunt with lung cancer, and paternal uncle with head and neck cancer. Presenting Symptoms:   Palpable breast mass discovered last year    Narrative with recent with Results/Procedures/Biopsies and Dates completed:  Mrs. Gildardo Kowk is a [de-identified]year old  female with a medical history of HTN, diabetes, neuropathy, GERD, CAD, COPD, hyperlipidemia, dementia, osteoarthritis, stroke, blind right eye, glaucoma, and diverticulitis. Surgical history includes appendectomy, cardiac cath with 2 stents (9/2021), cholecystectomy, vocal cord growth removed, bilateral optic nerve surgery, lipoma removal, and hemorrhoid surgery. Family history of HTN, diabetes, stroke, heart disease, and cancer above. She is a former smoker but quit in 2000. Denies alcohol or drug use. Patient had a palpable right breast mass, onset last year. Workup was delayed due to other health issues - heart cath, sons cancer surgery, and s heart surgery. On 5/9/22 she presented to [address] [place] [address] to start work up for breast mass. Imaging showed 2.2 cm mass in the 6:00 position of right breast.  Biopsy recommended. On 5/16/22 right breast core biopsy and right axilla core biopsy was completed. Pathology showed infiltrating ductal carcinoma, low grade (well differentiated) and axilla showed macro metastatic carcinoma. ER 99%, SC, 100%, and HER2 negative (0). Patient wanted to defer MRI breast imaging until after seen by medical oncology and surgery.   She was referred to oncology program and breast navigation team contacted patient. Referrals for medical oncology and surgery for evaluation and treatment recommendations. 5/9/22 [facility] Diagnostic Mammogram and US  FINDINGS: Bilateral digital diagnostic mammography was performed, and is interpreted in conjunction with a computer assisted detection (CAD) system. The breast parenchyma is heterogeneously dense, which may limit detection of small masses. Biopsy clips are present in the inferior right breast at middle depth and outer left breast posteriorly. There is a stable asymmetry in the outer left breast at middle to posterior depth. Typically benign-appearing calcifications are scattered throughout both breasts. There is an asymmetry in the inferior posterior right breast on the MLO and MLO spot compression views only corresponding to the area of palpable concern. The patient was referred to sonography. RIGHT BREAST ULTRASOUND: Sonography of the palpable abnormality in the 6:00 right breast 8 cm from the nipple demonstrates an irregular 2.2 cm hypoechoic mass. An ultrasound-guided biopsy is recommended. IMPRESSION:  Palpable 2.2 cm hypoechoic mass in the 6:00 posterior right breast 8 cm from the nipple. Recommend biopsy for further evaluation. 5/16/22 [facility] Pathology Right Breast Biopsy  DIAGNOSIS     A:  \" RIGHT BREAST, 6:00 POSITION, 8 CM FROM NIPPLE, CORE BIOPSY\":         INFILTRATING DUCTAL CARCINOMA, LOW GRADE (WELL DIFFERENTIATED).    DEFINITE IN SITU COMPONENT AND LYMPHOVASCULAR INVASION ARE NOT IDENTIFIED          B:  \" RIGHT AXILLA, CORE BIOPSY\":         MACRO METASTATIC CARCINOMA SIMILAR TO A FOCALLY INVOLVING CONNECTIVE   TISSUE CONSISTENT WITH EXTRACAPSULAR EXTENSION    TEST NAME:                            RESULTS:                INTERNAL CONTROLS:   ESTROGEN RECEPTOR:                 Positive (99%)             Present, Positive   PROGESTERONE RECEPTOR:           Positive (100%) Present, Positive   HER-2/DAVID:                            Negative (0)                Percentage of Cells with     Uniform Intense Complete Membrane Stainin%      Notes from Referring Provider:  22 progress note PCP  Mass overlapping multiple quadrants of right breast-mmg with ultrasound, and biopsy needed as deferred by family illness. Pt agreeable to studies when rescheduled pending family surgery    Other Pertinent Information:  Covid vaccination J&J 21     MRI of breast was ordered but patient wanted to defer scheduling until after seeing oncology. Consult with Dr. Luis Charles is scheduled for 22.     Presented at Tumor Board:  22

## 2022-06-24 ENCOUNTER — OFFICE VISIT (OUTPATIENT)
Dept: ONCOLOGY | Age: 82
End: 2022-06-24
Payer: MEDICARE

## 2022-06-24 VITALS
RESPIRATION RATE: 18 BRPM | BODY MASS INDEX: 31.81 KG/M2 | DIASTOLIC BLOOD PRESSURE: 76 MMHG | WEIGHT: 162.9 LBS | OXYGEN SATURATION: 99 % | HEART RATE: 97 BPM | TEMPERATURE: 98 F | SYSTOLIC BLOOD PRESSURE: 129 MMHG

## 2022-06-24 DIAGNOSIS — C50.511 MALIGNANT NEOPLASM OF LOWER-OUTER QUADRANT OF RIGHT BREAST OF FEMALE, ESTROGEN RECEPTOR POSITIVE (HCC): Primary | ICD-10-CM

## 2022-06-24 DIAGNOSIS — Z17.0 MALIGNANT NEOPLASM OF LOWER-OUTER QUADRANT OF RIGHT BREAST OF FEMALE, ESTROGEN RECEPTOR POSITIVE (HCC): Primary | ICD-10-CM

## 2022-06-24 PROCEDURE — 99205 OFFICE O/P NEW HI 60 MIN: CPT | Performed by: INTERNAL MEDICINE

## 2022-06-24 PROCEDURE — 1123F ACP DISCUSS/DSCN MKR DOCD: CPT | Performed by: INTERNAL MEDICINE

## 2022-06-24 ASSESSMENT — PATIENT HEALTH QUESTIONNAIRE - PHQ9
SUM OF ALL RESPONSES TO PHQ QUESTIONS 1-9: 0
SUM OF ALL RESPONSES TO PHQ9 QUESTIONS 1 & 2: 0
2. FEELING DOWN, DEPRESSED OR HOPELESS: 0
1. LITTLE INTEREST OR PLEASURE IN DOING THINGS: 0
SUM OF ALL RESPONSES TO PHQ QUESTIONS 1-9: 0

## 2022-06-24 NOTE — PROGRESS NOTES
[facility] Hematology and Oncology: Office Visit New Patient H & P    Chief Complaint:    Chief Complaint   Patient presents with    Breast Cancer         History of Present Illness:  Ms. Ahmet Robles is a [de-identified] y.o. female who presents today for evaluation regarding breast cancer. She had a palpable right breast mass that she noted last year. Her workup was delayed due to other personal and family health issues - heart cath, sons cancer surgery, and s heart surgery. On 5/9/22 she presented to [address] [place] [address] to start work up for her breast mass. Imaging showed a 2.2 cm mass in the 6:00 position of right breast and biopsy was recommended. On 5/16/22 right breast core biopsy and right axilla core biopsy was completed. Pathology showed infiltrating ductal carcinoma, low grade (well differentiated) and biopsy of a right axilla showed macrometastatic carcinoma. ER 99%, DC, 100%, and HER2 negative (0). The patient wanted to defer MRI breast imaging until after seen by medical oncology and surgery. She was referred to the oncology program and breast navigation team contacted patient. Referrals for medical oncology and surgery for evaluation and treatment recommendations were placed, and she presented for discussion. She is to see Dr. Jareth Ashton next week. Review of Systems:  Constitutional: Negative. HENT: Negative. Eyes: Negative. Respiratory: Negative. Cardiovascular: Negative. Gastrointestinal: Negative. Genitourinary: Negative. Musculoskeletal: Negative. Skin: Negative. Neurological: Negative. Endo/Heme/Allergies: Negative. Psychiatric/Behavioral: Negative. All other systems reviewed and are negative.      Allergies   Allergen Reactions    Meperidine Nausea And Vomiting     Stomach upset    Morphine Nausea And Vomiting     Stomach upset     Past Medical History:   Diagnosis Date    Anatomical narrow angle borderline glaucoma 6/10/2015    Blindness of left eye stents     CHOLECYSTECTOMY      HEMORRHOID SURGERY      HYSTERECTOMY (CERVIX STATUS UNKNOWN)      25s    OTHER SURGICAL HISTORY  2012    removed blood clot from her anus    OTHER SURGICAL HISTORY      benign growth removal from vocal cords    OTHER SURGICAL HISTORY      full dentures r/t tooth extractions    OTHER SURGICAL HISTORY      both eyes optic nerve    OTHER SURGICAL HISTORY  2012    lipoma removal    OTHER SURGICAL HISTORY      exploratory    POLYPECTOMY      TONSILLECTOMY      US BREAST NEEDLE BIOPSY RIGHT Right 2022    US BREAST NEEDLE BIOPSY RIGHT 2022 [facility]    US LYMPH NODE BIOPSY  2022    US LYMPH NODE BIOPSY 2022 [facility]     Family History   Problem Relation Age of Onset    Heart Disease Maternal Grandfather     Cancer Paternal Uncle         throat cancer    Cancer Paternal Grandfather         throat cancer    Other Other         clogged arteries diverticulitis    Breast Cancer Sister 72    Breast Cancer Daughter 62    Diabetes Mother    Floydene Ada Stroke Mother     Hypertension Mother     Diabetes Father     Heart Disease Father     Hypertension Father     Other Maternal Daphne Copping tumors, cyst in kidneys    Diabetes Maternal Uncle     Cancer Paternal Aunt         lung    COPD Paternal Aunt     Diabetes Maternal Grandfather      Social History     Socioeconomic History    Marital status:      Spouse name: Not on file    Number of children: Not on file    Years of education: Not on file    Highest education level: Not on file   Occupational History    Not on file   Tobacco Use    Smoking status: Former Smoker     Packs/day: 1.00     Quit date: 2000     Years since quittin.4    Smokeless tobacco: Never Used   Substance and Sexual Activity    Alcohol use: No    Drug use: No    Sexual activity: Not on file   Other Topics Concern    Not on file   Social History Narrative    Not on file     Social Determinants of Health     Financial Resource Strain:     Difficulty of Paying Living Expenses: Not on file   Food Insecurity:     Worried About Running Out of Food in the Last Year: Not on file    Jon of Food in the Last Year: Not on file   Transportation Needs:     Lack of Transportation (Medical): Not on file    Lack of Transportation (Non-Medical):  Not on file   Physical Activity:     Days of Exercise per Week: Not on file    Minutes of Exercise per Session: Not on file   Stress:     Feeling of Stress : Not on file   Social Connections:     Frequency of Communication with Friends and Family: Not on file    Frequency of Social Gatherings with Friends and Family: Not on file    Attends [employer] Services: Not on file    Active Member of [address] [place] [address] [employer] or Organizations: Not on file    Attends Club or Organization Meetings: Not on file    Marital Status: Not on file   Intimate Partner Violence:     Fear of Current or Ex-Partner: Not on file    Emotionally Abused: Not on file    Physically Abused: Not on file    Sexually Abused: Not on file   Housing Stability:     Unable to Pay for Housing in the Last Year: Not on file    Number of [place] in the Last Year: Not on file    Unstable Housing in the Last Year: Not on file     Current Outpatient Medications   Medication Sig Dispense Refill    Lancets MISC To be used with dispensed glucometer to test 5 times daily      albuterol sulfate HFA (PROAIR HFA) 108 (90 Base) MCG/ACT inhaler Inhale 1 puff into the lungs 3 times daily as needed      aspirin 81 MG chewable tablet Take 81 mg by mouth daily      atorvastatin (LIPITOR) 10 MG tablet Take 10 mg by mouth      Calcium Carb-Cholecalciferol (CALTRATE 600+D3 SOFT) 600-800 MG-UNIT CHEW Take 1 tablet by mouth daily      clopidogrel (PLAVIX) 75 MG tablet Take 75 mg by mouth daily      DULoxetine (CYMBALTA) 30 MG extended release capsule Take 30 mg by mouth daily      Empagliflozin-linaGLIPtin (GLYXAMBI) 25-5 MG TABS 1 per day for diabetes      gabapentin (NEURONTIN) 600 MG tablet 1 tid for neuropathy      hydrocortisone acetate (PROCTOCORT) 10 % external foam Place 1 applicator rectally every other day      hydrOXYzine (ATARAX) 25 MG tablet 1-2 every 8-12hours prn anxiety/sleep      insulin detemir (LEVEMIR FLEXTOUCH) 100 UNIT/ML injection pen 20 units every evening for diabetes Indications: type 2 diabetes mellitus      linaclotide (LINZESS) 145 MCG capsule Take 145 mcg by mouth every morning (before breakfast)      nitroGLYCERIN (NITROSTAT) 0.4 MG SL tablet Place 0.4 mg under the tongue      omeprazole (PRILOSEC) 20 MG delayed release capsule For heartburn/refluxTAKE 1 CAPSULE BY MOUTH EVERY DAY      polyethylene glycol (GLYCOLAX) 17 GM/SCOOP powder Take 17 g by mouth 2 times daily       No current facility-administered medications for this visit. OBJECTIVE:  /76 (Site: Left Upper Arm, Position: Standing)   Pulse 97   Temp 98 °F (36.7 °C) (Oral)   Resp 18   Wt 162 lb 14.4 oz (73.9 kg)   SpO2 99%   BMI 31.81 kg/m²     Physical Exam:  Constitutional: Well developed, well nourished female in no acute distress, sitting comfortably on the examination table. HEENT: Normocephalic and atraumatic. Sclerae anicteric. Neck supple without JVD. No thyromegaly present. Lymph node   No palpable submandibular, cervical, supraclavicular, axillary lymph nodes. Skin Warm and dry. No bruising and no rash noted. No erythema. No pallor. Respiratory Lungs are clear to auscultation bilaterally without wheezes, rales or rhonchi, normal air exchange without accessory muscle use. CVS Normal rate, regular rhythm and normal S1 and S2. No murmurs, gallops, or rubs. Abdomen Soft, nontender and nondistended, normoactive bowel sounds. No palpable mass. No hepatosplenomegaly. Neuro Grossly nonfocal with no obvious sensory or motor deficits.    MSK Normal range of motion in general.  No edema and no tenderness. Breast Right breast with small 1-2 cm nodule in inferolateral portion. Psych Appropriate mood and affect. Some repetition of questions but very pleasant and appropriate. Labs:  No results found for this or any previous visit (from the past 24 hour(s)). Imaging:  STUDY:  Bilateral digital diagnostic mammogram         INDICATION: Palpable right breast lump in right breast pain;       COMPARISON:   2/25/2014       FINDINGS: Bilateral digital diagnostic mammography was performed, and is   interpreted in conjunction with a computer assisted detection (CAD) system.         The breast parenchyma is heterogeneously dense, which may limit detection of   small masses.         Biopsy clips are present in the inferior right breast at middle depth and outer   left breast posteriorly. There is a stable asymmetry in the outer left breast at   middle to posterior depth. Typically benign-appearing calcifications are   scattered throughout both breasts.       There is an asymmetry in the inferior posterior right breast on the MLO and MLO   spot compression views only corresponding to the area of palpable concern.       The patient was referred to sonography.       RIGHT BREAST ULTRASOUND: Sonography of the palpable abnormality in the 6:00   right breast 8 cm from the nipple demonstrates an irregular 2.2 cm hypoechoic   mass. An ultrasound-guided biopsy is recommended.                 Impression   Palpable 2.2 cm hypoechoic mass in the 6:00 posterior right breast 8   cm from the nipple. Recommend biopsy for further evaluation.       BI-RADS Assessment Category 4: Suspicious Finding- Biopsy should be considered.                 Pathology:  * * *DIAGNOSIS* * * * * * * * * * * * * * * * * * * * * * * * * * * * * * *            A:  \" RIGHT BREAST, 6:00 POSITION, 8 CM FROM NIPPLE, CORE BIOPSY\":         INFILTRATING DUCTAL CARCINOMA, LOW GRADE (WELL DIFFERENTIATED).    DEFINITE IN SITU COMPONENT AND LYMPHOVASCULAR INVASION ARE NOT IDENTIFIED          B:  \" RIGHT AXILLA, CORE BIOPSY\":         MACRO METASTATIC CARCINOMA SIMILAR TO A FOCALLY INVOLVING CONNECTIVE   TISSUE CONSISTENT WITH EXTRACAPSULAR EXTENSION             jtl/2022     * * *Electronically Signed Out* * *         Sign Out Date: 2022  NICHOLAS Campbell M.D.           * * *PROCEDURES/ADDENDA* * * * * * * * * * * * * * * * * * * * *  * * * *                         STF- ER/AZ/RFW1AKF BY IHC                                                                                   Status:  Reviewed By:   Mirela Farooq MD on 2022                                 Interpretation                       [employer] IHC Quantitative Breast Panel     TEST NAME:                         RESULTS:               INTERNAL   CONTROLS:     ESTROGEN RECEPTOR:               Positive (99%)               Present,   Positive   PROGESTERONE RECEPTOR:          Positive (100%)               Present,   Positive   HER-2/DAVID:                         Negative (0)               Percentage   of Cells with Uniform        Intense Complete Membrane   Stainin%       ASSESSMENT:   Diagnosis Orders   1.  Malignant neoplasm of lower-outer quadrant of right breast of female, estrogen receptor positive ([facility] [address]       Patient Active Problem List   Diagnosis    Diverticulosis    Recurrent depression ([facility] [address] [facility]    Optic atrophy    Nonrheumatic aortic valve stenosis    Chronic angle-closure glaucoma    Post PTCA    Cellulitis of left index finger    Flexor tenosynovitis of finger    At high risk for falls    Type 2 diabetes with nephropathy (HCC)    Non compliance w medication regimen    S/p TAVR (transcatheter aortic valve replacement), bioprosthetic    Dehydration, moderate    Neuropathy    Hypertension    Coronary artery disease involving native coronary artery of native heart    Murmur, cardiac    Type II diabetes mellitus with neurological manifestations, uncontrolled ([facility] [address]    Anatomical narrow angle borderline glaucoma    COPD (chronic obstructive pulmonary disease) (HCC)    Nearsightedness    Hyperlipemia    Osteoarthritis of multiple joints    Severe stage glaucoma    Vitamin D deficiency    Regular astigmatism    Cat bite of finger    Polyneuropathy associated with underlying disease ([facility] [address]    Nuclear cataract, nonsenile    Aortic valve stenosis    Memory loss    At moderate risk for fall    GERD (gastroesophageal reflux disease)           PLAN:  Lab studies and imaging studies ere personally reviewed. Pertinent old records were reviewed. Breast cancer: 2.2 cm on ultrasound, low grade, right breast, 1 suspicious lymph node biopsied and positive in the right axilla. [id number]. I discussed the pathophysiology and natural history of breast cancer with Ms. Laverne Encinas. The usual treatment modalities employed for breast cancer include surgery, chemotherapy, radiation, or endocrine therapy in some combination. Because of the strong ER/CO expression and HER2 negative tumor, I would advise upfront surgery, likely lumpectomy and axillary dissection. After surgery is completed, I would recommend follow-up with oncology for discussion of adjuvant therapy, hopefully if she has pathologic T2N1 disease we would be able to perform gene recurrence testing for risk stratification, and if low we can safely forgo chemotherapy and proceed with radiation, followed by endocrine therapy. I note that she also has some mild-moderate dementia, so chemotherapy would only be offered if absolutely necessary for risk reduction. They are in agreement to proceed and will meet with surgery next week for discussion. All questions were asked and answered to the best of my ability.             Cricket Grove MD, MD  [facility] Hematology and Oncology  [address] [place] [address]  Office : [phone]  Fax : [phone]

## 2022-06-29 ENCOUNTER — OFFICE VISIT (OUTPATIENT)
Dept: SURGERY | Age: 82
End: 2022-06-29
Payer: MEDICARE

## 2022-06-29 VITALS
SYSTOLIC BLOOD PRESSURE: 101 MMHG | DIASTOLIC BLOOD PRESSURE: 68 MMHG | HEIGHT: 60 IN | BODY MASS INDEX: 31.8 KG/M2 | WEIGHT: 162 LBS | HEART RATE: 63 BPM

## 2022-06-29 DIAGNOSIS — C50.911 INFILTRATING DUCTAL CARCINOMA OF BREAST, RIGHT (HCC): Primary | ICD-10-CM

## 2022-06-29 PROCEDURE — 99204 OFFICE O/P NEW MOD 45 MIN: CPT | Performed by: STUDENT IN AN ORGANIZED HEALTH CARE EDUCATION/TRAINING PROGRAM

## 2022-06-29 PROCEDURE — 1123F ACP DISCUSS/DSCN MKR DOCD: CPT | Performed by: STUDENT IN AN ORGANIZED HEALTH CARE EDUCATION/TRAINING PROGRAM

## 2022-06-29 NOTE — PROGRESS NOTES
General Surgery New Patient Office Note:    6/29/2022    Camilla Arriaga  MRN: [id number]      CHIEF COMPLAINT: Right breast mass      PRIMARY CARE PHYSICIAN: Justin Montelongo MD      HISTORY: Pt presents with a right breast mass. Patient presents with a right palpable breast mass. She states that she noticed this about a month ago. She states that she was going to try to ignore it. Patient ultimately underwent a mammogram.  Ultimately she was diagnosed with right infiltrating ductal carcinoma, ER positive, DE positive, HER2 negative. She also has 1 biopsy confirmed metastatic lymph node. Patient does have a strong family history of breast cancer with her daughter having breast cancer at the age of 62, her twin sister having breast cancer as well as her niece of her twin sister. Patient is on aspirin and Plavix for a stent approximately 4 months ago.       REVIEW OF SYSTEMS: 10 Point ROS negative except what is in HPI      Past Medical History:   Diagnosis Date    Anatomical narrow angle borderline glaucoma 6/10/2015    Blindness of left eye     Cat bite of finger     Chronic angle-closure glaucoma 6/10/2015    COPD (chronic obstructive pulmonary disease) ([address] 06/10/2015    managed by family dr   [facility] Coronary artery disease involving native coronary artery of native heart 10/11/2021    stents x 2; no CP no SOB     Cortical, lamellar, or zonular cataract, nonsenile 6/10/2015    COVID-19 vaccine series completed 05/14/2021    J&J vaccine one dose regimen    Dementia ([address]     medications     Diabetes ([address]     Diverticulosis     Gastrointestinal disorder     diverticultits x 2 episodes    GERD (gastroesophageal reflux disease) 06/10/2015    controlled     Hyperlipemia 6/10/2015    Hypertension 06/10/2015    no meds at this time: BP is 111/50 as of 11/22/2021     Memory loss 06/10/2015    memantine     Menopause     Methicillin resistant Staphylococcus aureus in conditions classified elsewhere and of unspecified site     Nearsightedness 6/10/2015    Neuropathy 06/10/2015    gabapentin    Nuclear cataract, nonsenile 6/10/2015    Obesity     Optic atrophy, 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    Osteoarthritis     Osteoarthrosis involving, or with mention of more than one site, but not specified as generalized, site unspecified(715.80) 6/10/2015    Other and combined forms of senile cataract 6/10/2015    Other ill-defined conditions(029.89)     back pain    Pain in limb     Psychiatric disorder     depression; memory issues     Regular astigmatism 6/10/2015    Severe stage glaucoma 06/10/2015    St[address]     over 20 years ago; blind in right eye due to stroke     Type II or unspecified type diabetes mellitus with neurological manifestations, not stated as uncontrolled(250.60) 06/10/2015    does not do fbs checks; today 12/8/20 fbs 250; Hgb A1c: 9.3 on 9/4/61    Umbilical hernia     no repair       Current Outpatient Medications   Medication Sig Dispense Refill    Lancets MISC To be used with dispensed glucometer to test 5 times daily      albuterol sulfate HFA (PROAIR HFA) 108 (90 Base) MCG/ACT inhaler Inhale 1 puff into the lungs 3 times daily as needed      aspirin 81 MG chewable tablet Take 81 mg by mouth daily      atorvastatin (LIPITOR) 10 MG tablet Take 10 mg by mouth      Calcium Carb-Cholecalciferol (CALTRATE 600+D3 SOFT) 600-800 MG-UNIT CHEW Take 1 tablet by mouth daily      clopidogrel (PLAVIX) 75 MG tablet Take 75 mg by mouth daily      DULoxetine (CYMBALTA) 30 MG extended release capsule Take 30 mg by mouth daily      Empagliflozin-linaGLIPtin (GLYXAMBI) 25-5 MG TABS 1 per day for diabetes      gabapentin (NEURONTIN) 600 MG tablet 1 tid for neuropathy      hydrocortisone acetate (PROCTOCORT) 10 % external foam Place 1 applicator rectally every other day      hydrOXYzine (ATARAX) 25 MG tablet 1-2 every 8-12hours prn anxiety/sleep      insulin detemir (LEVEMIR FLEXTOUCH) 100 UNIT/ML injection pen 20 units every evening for diabetes Indications: type 2 diabetes mellitus      linaclotide (LINZESS) 145 MCG capsule Take 145 mcg by mouth every morning (before breakfast)      nitroGLYCERIN (NITROSTAT) 0.4 MG SL tablet Place 0.4 mg under the tongue      omeprazole (PRILOSEC) 20 MG delayed release capsule For heartburn/refluxTAKE 1 CAPSULE BY MOUTH EVERY DAY      polyethylene glycol (GLYCOLAX) 17 GM/SCOOP powder Take 17 g by mouth 2 times daily       No current facility-administered medications for this visit.        Family History   Problem Relation Age of Onset    Heart Disease Maternal Grandfather     Cancer Paternal Uncle         throat cancer    Cancer Paternal Grandfather         throat cancer    Other Other         clogged arteries diverticulitis    Breast Cancer Sister 72    Breast Cancer Daughter 62    Diabetes Mother     Stroke Mother     Hypertension Mother     Diabetes Father     Heart Disease Father     Hypertension Father     Other Maternal [place] tumors, cyst in kidneys    Diabetes Maternal Uncle     Cancer Paternal Aunt         lung    COPD Paternal Aunt     Diabetes Maternal Grandfather        Social History     Socioeconomic History    Marital status:      Spouse name: None    Number of children: None    Years of education: None    Highest education level: None   Occupational History    None   Tobacco Use    Smoking status: Former Smoker     Packs/day: 1.00     Quit date: 2000     Years since quittin.4    Smokeless tobacco: Never Used   Substance and Sexual Activity    Alcohol use: No    Drug use: No    Sexual activity: None   Other Topics Concern    None   Social History Narrative    None     Social Determinants of Health     Financial Resource Strain:     Difficulty of Paying Living Expenses: Not on file   Food Insecurity:     Worried About Running Out of Food in the Last Year: Not on file    [address] in the Last Year: Not on file   Transportation Needs:     Lack of Transportation (Medical): Not on file    Lack of Transportation (Non-Medical): Not on file   Physical Activity:     Days of Exercise per Week: Not on file    Minutes of Exercise per Session: Not on file   Stress:     Feeling of Stress : Not on file   Social Connections:     Frequency of Communication with Friends and Family: Not on file    Frequency of Social Gatherings with Friends and Family: Not on file    Attends L[employer] day Services: Not on file    Active Member of [address] [place] [address] [employer] or Organizations: Not on file    Attends Club or Organization Meetings: Not on file    Marital Status: Not on file   Intimate Partner Violence:     Fear of Current or Ex-Partner: Not on file    Emotionally Abused: Not on file    Physically Abused: Not on file    Sexually Abused: Not on file   Housing Stability:     Unable to Pay for Housing in the Last Year: Not on file    Number of [place] in the Last Year: Not on file    Unstable Housing in the Last Year: Not on file         PHYSICAL EXAMINATION:    /68   Pulse 63   Ht 5' (1.524 m)   Wt 162 lb (73.5 kg)   BMI 31.64 kg/m²     General Appearance AOx3, in no acute distress  Eyes Conjunctivae/corneas clear. PERRL, EOM's intact. No scleral icterus  Ears, Nose, Throat ENT exam normal, no neck nodes or sinus tenderness  Neck supple, no significant adenopathy. No notable JVD  Breast: Right palpable lesion at the 6 o'clock position near the inframammary fold. Respiratory No chest wall deformities or tenderness, respiratory effort normal, no use of accessory muscles. Cardiovascular RRR. No chest wall tenderness. Gastrointestinal Abdomen soft, nontender, nondistended. BS x4. No rebound, guarding, or rigidity present. No palpable masses. No CVA tenderness  Lymphatics No palpable lymphadenopathy, no hepatosplenomegaly  Musculoskeletal No joint tenderness, deformity or swelling. Full ROM UE/LE.  Distal pulses intact UE/LE. No edema, cyanosis, or venous stasis changes. Skin Normal coloration and turgor, no rashes, no suspicious skin lesions noted  Neurological alert, oriented, normal speech, no focal findings or movement disorder noted, CN II-XII intact  Psychiatric Alert and oriented, appropriate affect      STUDIES: MRI Result (most recent):  No results found for this or any previous visit from the past 3650 days. Mammogram Result (most recent): MAMMOGRAM POST BX CLIP PLACEMENT RIGHT 05/16/2022    Narrative  This is a summary report. The complete report is available in the patient's medical record. If you cannot access the medical record, please contact the sending organization for a detailed fax or copy. POSTBIOPSY MAMMOGRAM, 5/16/2022. CLINICAL HISTORY: Status post percutaneous biopsy. FINDINGS:    CC, ML views of the right breast demonstrate the biopsy clip in the posterior,  inferior soft tissues of the right breast. No significant post biopsy hematoma  is seen. There has been successful placement of the biopsy clip which occurs  within the previously described indeterminate asymmetry. Impression  1. Successful biopsy and placement of marker clip. This is a post biopsy mammogram and does not require BI-RADS categorization. IMPRESSION:  Right breast infiltrating ductal carcinoma with metastasis to right axillary lymph node    PLAN:  Discussed pathology in detail with pt. ER/ND/[id number] status also reviewed and we discussed how this may modify her pre and post-surgical management including treatment with neoadjuvant chemo, adjuvant chemo, anti-estrogen agents or the need to stop any hormonal supplementation she may be taking. No plans for neoadjuvant chemotherapy at this point in time. She will need postoperative antiestrogen agents. Discussed family history as it may relate to any value to investigation of a genetic basis for her cancer.   Patient does have a strong family history of breast cancer. I will refer her to genetic testing. She states that she does not think her daughter underwent genetic testing at the time of her diagnosis. Discussed management options. Initial treatment should be surgical. Reviewed total vs. Partial mastectomy (w/ XRT) including different local recurrence rates but equivalent long term survival rates. Discussed potential indications for post-mastectomy XRT as well. I discussed with the patient at this time I think that we can get away with a targeted axillary dissection meaning I would have radiology to localize the positive lymph node and then do a sentinel lymph node biopsy on the remainder. If 3 or more lymph nodes came back as positive then we would proceed to an axillary lymph node dissection. After discussion, we will proceed with right localized lumpectomy with localized lymph node excision and sentinel lymph node biopsy. We have discussed the technical details of the procedure(s) in detail. Risks reviewed include anesthetic risks, bleeding, infection, wound healing problems and cosmetic abnormalities, need for further surgical intervention depending on pathology reports, lymphedema if axillary procedure planned, and recurrence. All questions are answered.

## 2022-07-07 ENCOUNTER — PREP FOR PROCEDURE (OUTPATIENT)
Dept: SURGERY | Age: 82
End: 2022-07-07

## 2022-07-08 NOTE — PROGRESS NOTES
[facility] - Initial Evaluation   Patient: Taryn Narvaez   YOB: 1940      Location: [facility] HEMATOLOGY AND ONCOLOGY - Provider participated in today's evaluation via telemedicine in [place], [place]. Patient completed today's evaluation from home. Date of Evaluation: 7/14/2022      Referral Source: Shawn Tracy,    Referral Reason: H99.494 (ICD-10-CM) - Infiltrating ductal carcinoma of breast, right [facility])      Genetic Counselor: Adam Guadalupe MS, [employer] (shadowed by  Greta Houser)      Taryn Narvaez was referred for evaluation for the potential of hereditary cancer due to due to her diagnosis of breast cancer. Jack Lopez has consented to a visit via telehealth in lieu of a face to face office visit during the coronavirus pandemic. Jack Lopez was accompanied to today's visit by her , who answered most of the history questions. Personalized risk assessment was performed and genetic testing options were reviewed. For full details, please see Risk Assessment and Discussion in this document. Following genetic counseling, Malathi's action plan is:   DEFERS TESTING: Following today's discussion, Jack Lopez was not interested in pursuing genetic testing at this time. Relevant Personal Medical History   Jack Lopez is a 28-year-old female who was diagnosed with infiltrating ductal carcinoma of the right breast at age [de-identified]. Tumor markers were ER and NE positive, HER2 negative. Treatment will start with surgery. Malathi's surgical decision is dependent on whether or not Jack Lopez has a hereditary predisposition to cancer. Genetic testing could be utmost importance in ensuring proper medical management of Malathi.     Hormonal history:   Age of Menarche: 16   Number of Pregnancies: 5   Number of Live Births: 11   Age of First Live Birth: 17   Breast Feeding History: Denies   Fertility Treatment: Denies   Contraception Use: Denies   Age of Menopause: 45s   Hormonal Replacement Therapy: Denies    Screening history:   Last gynecology appt: A few months ago   Latest colonoscopy: March 2022   History of polyps: N/A   Last dermatological exam: Denies   History of additional abnormal cancer screening: Concern for skin cancer but was negative    Social history:   Tobacco use: Denies   Alcohol use: Denies    General medical history:  Past Medical History:   Diagnosis Date    Anatomical narrow angle borderline glaucoma 6/10/2015    Blindness of left eye     Cat bite of finger     Chronic angle-closure glaucoma 6/10/2015    COPD (chronic obstructive pulmonary disease) ([facility] [address] 06/10/2015    managed by family dr Noam Thompson Coronary artery disease involving native coronary artery of native heart 10/11/2021    stents x 2; no CP no SOB     Cortical, lamellar, or zonular cataract, nonsenile 6/10/2015    COVID-19 vaccine series completed 05/14/2021    J&J vaccine one dose regimen    Dementia ([facility] [address]     medications     Diabetes ([facility] [address]     Diverticulosis     Gastrointestinal disorder     diverticultits x 2 episodes    GERD (gastroesophageal reflux disease) 06/10/2015    controlled     Hyperlipemia 6/10/2015    Hypertension 06/10/2015    no meds at this time: BP is 111/50 as of 11/22/2021     Memory loss 06/10/2015    memantine     Menopause     Methicillin resistant Staphylococcus aureus in conditions classified elsewhere and of unspecified site     Nearsightedness 6/10/2015    Neuropathy 06/10/2015    gabapentin    Nuclear cataract, nonsenile 6/10/2015    Obesity     Optic atrophy, 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    Osteoarthritis     Osteoarthrosis involving, or with mention of more than one site, but not specified as generalized, site unspecified(715.80) 6/10/2015    Other and combined forms of senile cataract 6/10/2015    Other ill-defined conditions(799.89)     back pain    Pain in limb     Psychiatric disorder     depression; memory issues     Regular astigmatism 6/10/2015    Severe stage glaucoma 06/10/2015    Stroke ([facility] [address]     over 20 years ago; blind in right eye due to stroke     Type II or unspecified type diabetes mellitus with neurological manifestations, not stated as uncontrolled(250.60) 06/10/2015    does not do fbs checks; today 12/8/20 fbs 250; Hgb A1c: 9.3 on 2/4/65    Umbilical hernia     no repair      Surgical history:   Hysterectomy: In 25s   Bilateral salpingo oophorectomy: In 20s  Past Surgical History:   Procedure Laterality Date    APPENDECTOMY      BREAST BIOPSY Right 05/16/2022    u/s: breast and axilla    CARDIAC CATHETERIZATION      x 2 stents     CHOLECYSTECTOMY  2000    HEMORRHOID SURGERY  1982    HYSTERECTOMY (CERVIX STATUS UNKNOWN)      25s    OTHER SURGICAL HISTORY  2/2012    removed blood clot from her anus    OTHER SURGICAL HISTORY  1978    benign growth removal from vocal cords    OTHER SURGICAL HISTORY      full dentures r/t tooth extractions    OTHER SURGICAL HISTORY  2001    both eyes optic nerve    OTHER SURGICAL HISTORY  4/2012    lipoma removal    OTHER SURGICAL HISTORY  1978    exploratory    POLYPECTOMY  1992    TONSILLECTOMY      US BREAST NEEDLE BIOPSY RIGHT Right 5/16/2022    US BREAST NEEDLE BIOPSY RIGHT 5/16/2022 [facility]    US LYMPH NODE BIOPSY  5/16/2022    US LYMPH NODE BIOPSY 5/16/2022 [facility]         Family History   A detailed three-generation family history was taken today. Minerva Velasco reported the following family history of cancer:    Breast cancer   Daughter, age 62   Twin sister, diagnosed 72years of age  Christina Niece, diagnosed 37years of age  Christina Maternal aunt, diagnosed 45s     Throat cancer    Paternal grandfather   Paternal uncle    Unknown cancer   Maternal aunt   Maternal uncle   Maternal uncle  Christina Maternal uncle   Lung cancer   Mother  There is no known Ashkenazi [employer]orationism ancestry. There is no report of consanguinity.  The history is by Malathi's report solely, and our counseling and risk assessment is based on the accuracy of this provided history. Should the information collected be found to be changed or different, our original assessment and recommendations may change. A copy of the pedigree can be found in media manager. Risk Assessment and Discussion   GENETICS OF HEREDITARY CANCER SYNDROMES: We first explained that our genetic material, which we inherit from both our parents, is like a set of directions that tells our body how to grow and function. Our genetic material can be further broken down into sections called genes, and these are specific sets of directions that are important in specific body functions. Harmful differences in the amount, spelling or order of our genetic material that cause genes not to function correctly are called pathogenic variants, and can cause symptoms or disease. During the appointment we reviewed that around 5-10% of individuals diagnosed with cancer will have a hereditary cancer susceptibility syndrome. Hereditary cancer susceptibility syndromes are caused most often by pathogenic (harmful) variants (genetic differences) in a group of genes called tumor suppressor genes. Tumor suppressor genes typically function to protect an individual from developing cancer. If an individual has a pathogenic variant in one of their two tumor suppressor genes, that copy does not function as it usually would to protect the body from cancer development and the individual is at increased risk to develop cancer. When an individual is born with one nonfunctioning tumor suppressor gene, the one working copy that is protecting the individual from cancer development can acquire a change through various environmental factors (example: UV radiation, diet, smoking, etc.) and can become nonfunctional as well.  With two nonfunctional tumor suppressor genes, the individual does not have any of the protection from that gene and is at risk to develop cancer. The specific cancer type(s) an individual is at increased risk for depend on the specific tumor suppressor gene identified to have a pathogenic variant. Changes to medical management may be recommended for this patient if they are shown to have a pathogenic variant in a tumor suppressor gene, and these are dependent on the gene. A majority of cases of hereditary breast cancer are due to pathogenic variants in the BRCA1 and BRCA2 genes, which are associated with Hereditary Breast and Ovarian Cancer Syndrome. There are additional genes that can also cause increased risk for breast cancer, but these make up a smaller proportion of overall cases. We determined that Malathi's family history is suspicious for a hereditary cancer syndrome because of the multiple diagnoses of the same cancer type, especially at a young age. INHERITANCE OF HEREDITARY CANCER SYNDROMES: We discussed that hereditary cancer syndromes usually run through families (or are inherited) in an autosomal dominant pattern. In autosomal dominant conditions, the term \"autosomal\" means that both females and males have an increased chance of having signs or symptoms of a condition. The term \"dominant\" means that an individual needs to have a harmful change in one copy of a gene (either the one inherited from a person's mother or the one inherited from a person's father) to be at risk for the signs or symptoms of a condition. Individuals who have a dominant genetic condition have a 50% (1 in 2) chance of passing on the harmful gene change for each pregnancy. POSSIBLE RESULTS OF GENETIC TESTING: We reviewed the possible results of genetic testing. We reviewed that testing could return a positive result, meaning we found a pathogenic variant that increases cancer risk.  In the context of a positive result, a finding could be unexpected, such as finding a pathogenic variant in a gene that increases the lifetime risk for a cancer the patient and their family have no history of. It is very possible that a positive result would impact current medical management of the patient and could result in increased or additional screenings, or even consideration of prophylactic surgery to decrease cancer risk. We could receive a negative result, meaning we did not find a pathogenic variant. We discussed that a negative result does not mean a patient will never get cancer, just that testing did not find a pathogenic variant that is responsible for increased cancer risk over the general population. We encouraged the patient that in the event of a negative result, it is still necessary to continue recommended cancer screening. We also discussed that the result could be negative for various reasons, and in some cases may not mean an individual's estimated risk should be lowered to population risk for cancer. Lastly, we could receive a result known as a variant (genetic difference) of uncertain significance. A variant of uncertain significance is a when we identify a genetic difference in a gene, but based on the current data and information available the testing laboratory cannot say whether or not that difference increases lifetime risk for cancer. We discussed that if we were to get this result, we would treat it as a negative, and not recommend changes to medical management or testing of additional family members for this variant unless requested by the laboratory to provide clarification. We discussed that changes in recommendations for medical management and family members eligible for testing would likely not take place unless the variant is reclassified. LIMITATIONS TO GENETIC TESTING: We also discussed several limitations regarding genetic testing in the context of hereditary cancer syndrome. We first discussed the concept of reduced penetrance.  This refers to the idea that a person may carry a harmful, disease-causing genetic change but never develop signs of symptoms of the disease. This is the case with hereditary cancer syndromes. A person may carry a change that puts him/her at an increased risk of developing cancer but may never actually develop cancer. Even if we were to identify a disease-causing genetic change in Shiva, for example, we would not be able to predict whether or not she would develop the related complications and concerns. In addition, we discussed the idea of variable expressivity. This refers to the idea that two people may carry the exact same genetic change but have different signs, symptoms, and progression of a disease. So overall, we discussed that even if a disease-causing change is identified in Shiva, we would not be able to fully predict if/when she would develop symptoms and the progression of her disease. Finally, we discussed the implications of the Genetic Information Nondiscrimination Act (EDDA) of 2008. This law provides federal protection from genetic discrimination in regards to health insurance and employment. EDDA prevents health insurance companies from using genetic information when making decisions regarding eligibility or premiums. In addition, this law also protects against genetic discrimination by most employers. EDDA does not apply to government employees, members of the [employer], or to employers with fewer than 15 employees. The other main limitation of EDDA is that the law does not cover life insurance, long-term care insurance or disability insurance. Additionally, EDDA does not protect an individual if they already have the disease; it only protects an individual that has a genetic predisposition to a disease. GENETIC TESTING FOR HEREDITARY CANCER SYNDROMES: We discussed that genetic testing for hereditary cancer syndromes can be done by looking at one specific gene, a few genes related to a specific type of cancer, or many genes related to common hereditary cancers.   All of these testing

## 2022-07-14 ENCOUNTER — TELEMEDICINE (OUTPATIENT)
Dept: ONCOLOGY | Age: 82
End: 2022-07-14

## 2022-07-14 DIAGNOSIS — Z17.0 MALIGNANT NEOPLASM OF LOWER-OUTER QUADRANT OF RIGHT BREAST OF FEMALE, ESTROGEN RECEPTOR POSITIVE (HCC): Primary | ICD-10-CM

## 2022-07-14 DIAGNOSIS — Z80.3 FAMILY HISTORY OF MALIGNANT NEOPLASM OF BREAST: ICD-10-CM

## 2022-07-14 DIAGNOSIS — C50.511 MALIGNANT NEOPLASM OF LOWER-OUTER QUADRANT OF RIGHT BREAST OF FEMALE, ESTROGEN RECEPTOR POSITIVE (HCC): Primary | ICD-10-CM

## 2022-07-22 DIAGNOSIS — C50.911 INFILTRATING DUCTAL CARCINOMA OF BREAST, RIGHT (HCC): Primary | ICD-10-CM

## 2022-08-18 ENCOUNTER — TELEPHONE (OUTPATIENT)
Dept: MAMMOGRAPHY | Age: 82
End: 2022-08-18

## 2022-08-22 ENCOUNTER — TELEPHONE (OUTPATIENT)
Dept: SURGERY | Age: 82
End: 2022-08-22

## 2022-08-22 NOTE — TELEPHONE ENCOUNTER
L/M with patient's son; for patient's , Vazquez Breach. Patient out of town due to a family emergency. Request to cancel and reschedule procedure on Wednesday, 8/24/22.

## 2022-09-27 ENCOUNTER — OFFICE VISIT (OUTPATIENT)
Dept: PRIMARY CARE CLINIC | Facility: CLINIC | Age: 82
End: 2022-09-27
Payer: MEDICARE

## 2022-09-27 VITALS
DIASTOLIC BLOOD PRESSURE: 70 MMHG | WEIGHT: 150 LBS | RESPIRATION RATE: 14 BRPM | SYSTOLIC BLOOD PRESSURE: 123 MMHG | HEIGHT: 60 IN | HEART RATE: 87 BPM | BODY MASS INDEX: 29.45 KG/M2 | TEMPERATURE: 97.2 F | OXYGEN SATURATION: 97 %

## 2022-09-27 DIAGNOSIS — Z98.61 POST PTCA: ICD-10-CM

## 2022-09-27 DIAGNOSIS — Z23 ENCOUNTER FOR IMMUNIZATION: ICD-10-CM

## 2022-09-27 DIAGNOSIS — G89.29 OTHER CHRONIC PAIN: ICD-10-CM

## 2022-09-27 DIAGNOSIS — I25.10 CORONARY ARTERY DISEASE INVOLVING NATIVE CORONARY ARTERY OF NATIVE HEART WITHOUT ANGINA PECTORIS: ICD-10-CM

## 2022-09-27 DIAGNOSIS — E78.2 MIXED HYPERLIPIDEMIA: ICD-10-CM

## 2022-09-27 DIAGNOSIS — Z95.3 S/P TAVR (TRANSCATHETER AORTIC VALVE REPLACEMENT), BIOPROSTHETIC: ICD-10-CM

## 2022-09-27 DIAGNOSIS — C50.911 INFILTRATING DUCTAL CARCINOMA OF BREAST, RIGHT (HCC): ICD-10-CM

## 2022-09-27 DIAGNOSIS — J41.0 SIMPLE CHRONIC BRONCHITIS (HCC): ICD-10-CM

## 2022-09-27 DIAGNOSIS — Z79.899 MEDICATION MANAGEMENT: ICD-10-CM

## 2022-09-27 DIAGNOSIS — Z71.89 ACP (ADVANCE CARE PLANNING): ICD-10-CM

## 2022-09-27 DIAGNOSIS — I50.22 CHRONIC SYSTOLIC (CONGESTIVE) HEART FAILURE (HCC): ICD-10-CM

## 2022-09-27 DIAGNOSIS — Z91.81 AT HIGH RISK FOR FALLS: ICD-10-CM
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
## 2022-09-27 PROBLEM — S61.259A CAT BITE OF FINGER: Status: RESOLVED | Noted: 2017-09-28 | Resolved: 2022-09-27

## 2022-09-27 PROBLEM — E55.9 VITAMIN D DEFICIENCY: Status: RESOLVED | Noted: 2018-01-03 | Resolved: 2022-01-01

## 2022-09-27 PROBLEM — L03.012 CELLULITIS OF LEFT INDEX FINGER: Status: RESOLVED | Noted: 2017-09-28 | Resolved: 2022-09-27

## 2022-09-27 LAB
ALBUMIN SERPL-MCNC: 3.9 G/DL (ref 3.2–4.6)
ALBUMIN/GLOB SERPL: 1.2 {RATIO} (ref 1.2–3.5)
ALP SERPL-CCNC: 114 U/L (ref 50–136)
ALT SERPL-CCNC: 14 U/L (ref 12–65)
ANION GAP SERPL CALC-SCNC: 8 MMOL/L (ref 4–13)
AST SERPL-CCNC: 5 U/L (ref 15–37)
BASOPHILS # BLD: 0 K/UL (ref 0–0.2)
BASOPHILS NFR BLD: 0 % (ref 0–2)
BILIRUB SERPL-MCNC: 0.7 MG/DL (ref 0.2–1.1)
BUN SERPL-MCNC: 19 MG/DL (ref 8–23)
CALCIUM SERPL-MCNC: 9.8 MG/DL (ref 8.3–10.4)
CHLORIDE SERPL-SCNC: 105 MMOL/L (ref 101–110)
CO2 SERPL-SCNC: 28 MMOL/L (ref 21–32)
CREAT SERPL-MCNC: 1 MG/DL (ref 0.6–1)
DIFFERENTIAL METHOD BLD: NORMAL
EOSINOPHIL # BLD: 0 K/UL (ref 0–0.8)
EOSINOPHIL NFR BLD: 1 % (ref 0.5–7.8)
ERYTHROCYTE [DISTWIDTH] IN BLOOD BY AUTOMATED COUNT: 12.7 % (ref 11.9–14.6)
GLOBULIN SER CALC-MCNC: 3.2 G/DL (ref 2.3–3.5)
GLUCOSE SERPL-MCNC: 330 MG/DL (ref 65–100)
HCT VFR BLD AUTO: 41.4 % (ref 35.8–46.3)
HGB BLD-MCNC: 14.4 G/DL (ref 11.7–15.4)
IMM GRANULOCYTES # BLD AUTO: 0 K/UL (ref 0–0.5)
IMM GRANULOCYTES NFR BLD AUTO: 0 % (ref 0–5)
LDLC SERPL DIRECT ASSAY-MCNC: 145 MG/DL
LYMPHOCYTES # BLD: 1.1 K/UL (ref 0.5–4.6)
LYMPHOCYTES NFR BLD: 20 % (ref 13–44)
MCH RBC QN AUTO: 32.6 PG (ref 26.1–32.9)
MCHC RBC AUTO-ENTMCNC: 34.8 G/DL (ref 31.4–35)
MCV RBC AUTO: 93.7 FL (ref 79.6–97.8)
MONOCYTES # BLD: 0.4 K/UL (ref 0.1–1.3)
MONOCYTES NFR BLD: 7 % (ref 4–12)
NEUTS SEG # BLD: 3.8 K/UL (ref 1.7–8.2)
NEUTS SEG NFR BLD: 72 % (ref 43–78)
NRBC # BLD: 0 K/UL (ref 0–0.2)
PLATELET # BLD AUTO: 153 K/UL (ref 150–450)
PMV BLD AUTO: 11 FL (ref 9.4–12.3)
POTASSIUM SERPL-SCNC: 5.1 MMOL/L (ref 3.5–5.1)
PROT SERPL-MCNC: 7.1 G/DL (ref 6.3–8.2)
RBC # BLD AUTO: 4.42 M/UL (ref 4.05–5.2)
SODIUM SERPL-SCNC: 141 MMOL/L (ref 136–145)
TSH, 3RD GENERATION: 0.99 UIU/ML (ref 0.36–3.74)
WBC # BLD AUTO: 5.4 K/UL (ref 4.3–11.1)

## 2022-09-27 PROCEDURE — 90674 CCIIV4 VAC NO PRSV 0.5 ML IM: CPT | Performed by: FAMILY MEDICINE

## 2022-09-27 PROCEDURE — 1123F ACP DISCUSS/DSCN MKR DOCD: CPT | Performed by: FAMILY MEDICINE

## 2022-09-27 PROCEDURE — 99214 OFFICE O/P EST MOD 30 MIN: CPT | Performed by: FAMILY MEDICINE

## 2022-09-27 PROCEDURE — G0008 ADMIN INFLUENZA VIRUS VAC: HCPCS | Performed by: FAMILY MEDICINE

## 2022-09-27 PROCEDURE — 3046F HEMOGLOBIN A1C LEVEL >9.0%: CPT | Performed by: FAMILY MEDICINE

## 2022-09-27 RX ORDER — CLOPIDOGREL BISULFATE 75 MG/1
75 TABLET ORAL DAILY
Qty: 90 TABLET | Refills: 5 | Status: SHIPPED | OUTPATIENT
Start: 2022-09-27

## 2022-09-27 RX ORDER — DULOXETIN HYDROCHLORIDE 30 MG/1
30 CAPSULE, DELAYED RELEASE ORAL DAILY
Qty: 90 CAPSULE | Refills: 2 | Status: SHIPPED | OUTPATIENT
Start: 2022-09-27

## 2022-09-27 RX ORDER — LOSARTAN POTASSIUM 50 MG/1
50 TABLET ORAL DAILY
Qty: 90 TABLET | Refills: 1 | Status: SHIPPED | OUTPATIENT
Start: 2022-09-27

## 2022-09-27 RX ORDER — ATORVASTATIN CALCIUM 40 MG/1
40 TABLET, FILM COATED ORAL DAILY
Qty: 90 TABLET | Refills: 5 | Status: SHIPPED | OUTPATIENT
Start: 2022-09-27

## 2022-09-27 RX ORDER — METOPROLOL SUCCINATE 50 MG/1
50 TABLET, EXTENDED RELEASE ORAL DAILY
Qty: 30 TABLET | Refills: 3 | Status: SHIPPED | OUTPATIENT
Start: 2022-09-27

## 2022-09-27 RX ORDER — MULTIVIT-MIN/IRON FUM/FOLIC AC 7.5 MG-4
TABLET ORAL
Qty: 100 TABLET | Refills: 3 | Status: SHIPPED | OUTPATIENT
Start: 2022-09-27

## 2022-09-27 RX ORDER — INSULIN DETEMIR 100 [IU]/ML
INJECTION, SOLUTION SUBCUTANEOUS
Qty: 5 ADJUSTABLE DOSE PRE-FILLED PEN SYRINGE | Refills: 3 | Status: SHIPPED | OUTPATIENT
Start: 2022-09-27

## 2022-09-27 ASSESSMENT — ENCOUNTER SYMPTOMS
EYE PAIN: 0
DIARRHEA: 0
TROUBLE SWALLOWING: 0
COUGH: 0
SINUS PRESSURE: 0
EYE DISCHARGE: 0
CHOKING: 0
WHEEZING: 0
CHEST TIGHTNESS: 0
VOMITING: 0
ABDOMINAL PAIN: 0
SORE THROAT: 0
BLOOD IN STOOL: 0
PHOTOPHOBIA: 0
NAUSEA: 0
VOICE CHANGE: 0
CONSTIPATION: 0
SINUS PAIN: 0
RHINORRHEA: 0
ABDOMINAL DISTENTION: 0
BACK PAIN: 0
EYE REDNESS: 0
SHORTNESS OF BREATH: 0
COLOR CHANGE: 0

## 2022-09-27 ASSESSMENT — PATIENT HEALTH QUESTIONNAIRE - PHQ9
SUM OF ALL RESPONSES TO PHQ QUESTIONS 1-9: 0
SUM OF ALL RESPONSES TO PHQ QUESTIONS 1-9: 0
1. LITTLE INTEREST OR PLEASURE IN DOING THINGS: 0
SUM OF ALL RESPONSES TO PHQ9 QUESTIONS 1 & 2: 0
2. FEELING DOWN, DEPRESSED OR HOPELESS: 0
SUM OF ALL RESPONSES TO PHQ QUESTIONS 1-9: 0
SUM OF ALL RESPONSES TO PHQ QUESTIONS 1-9: 0

## 2022-09-27 NOTE — PATIENT INSTRUCTIONS
Diabetic teaching, diet, increase vegetables- at least 5 portions a day, roughly half plate, beans, whole grains, grilled or baked white meats , dairy products, exercise at least an hr - brisk walk aerobic of choice, No sugar/ suggary drinks including juice/ fats/ fried foods.  starch- bread/ potato/ rice. ( It takes roughly 40 minute of walk  To burn a 12 oz regular drink/ juice). Cutting out 12 oz drink a day equals to roughly 4 lb weight a yr! Decrease screen time- TV, Video, computer , cell phones- recommended less than 2 hrs a day     Type 2 diabetes is very common, obesity is the main reason for diabetes and  insulin resistance, most of the type 2 diabetes can be cured by weight management exercise. . Most type 2 diabetes has high insulin level  and high insulin level causes most of diabetic complications microvascular and macrovascular, damage to kidneys, eyes , cardiovascular , and neuropathy,, medications that correct insulin resistance such as metformin has been shown to decrease these complications by lowering insulin level and correcting insulin resistance. Frequent blood sugar checking is unnecessary    Frequent blood sugar checking is not necessity, normal person without diabetess fasting blood sugar is usually less than 105, after 3 -4 weeks of treatment, either diet alone, or diet and metformin, if fasting blood sugar less than 120, frequent BS checking is not necessary and continue diet exercise Metformin is enough. Starting metformin early and preventing diabetic complications. Exercise and weight management is most important    Adding insulin and continuing increasing dose,  not usually prevent diabetic complications .  Some newer medications that do not cause low BS, may help diabeted by supressing apetite and making pee sugar , may help loose weight ,  may be more beneficial when over weight, but are quite expensive and often not covered by insurance, long term benefits are not known, benefit of folic acid vitamin D, has already mineral vitamin is recommended doses.   Multiple different vitamins not recommended may carry increased risk, including vitamin E, take foods rich in omega 3 and fibre, pills are not recommended, including omega 3 in high doses, may have increased risks

## 2022-09-27 NOTE — PROGRESS NOTES
Here for follow-up along with her . Numerous medical problems. Recently diagnosed with right breast cancer and has follow-up with heme oncology for management treatment options. Diabetes poorly controlled last hemoglobin and previous hemoglobin over 10. Recommend metformin 500 twice a day recommended as first-line therapy with normal GFR irrespective of any other treatment she will take 20 units Levemir daily, may benefit from adding Ozempic however will avoid polypharmacy. Chronic diabetic complications including neuropathy. Will reduce dose of gabapentin. Mild cognitive impairment. She does not drive. History chronic smoking history aortic valve replacement. We will optimize medical management including statins higher dose recommended due to history angioplasty along with beta-blocker and ACE inhibitor's if tolerated start with half tablet a day. Multiple pains discussed pain management risk of pain medication continue Cymbalta acetaminophen over-the-counter and sparingly recheck after lab test    Review of Systems   Constitutional:  Negative for activity change, appetite change, chills, diaphoresis, fatigue, fever and unexpected weight change. HENT:  Negative for congestion, ear pain, hearing loss, nosebleeds, rhinorrhea, sinus pressure, sinus pain, sore throat, trouble swallowing and voice change. Eyes:  Negative for photophobia, pain, discharge, redness and visual disturbance. Respiratory:  Negative for cough, choking, chest tightness, shortness of breath and wheezing. Cardiovascular:  Negative for chest pain, palpitations and leg swelling. Gastrointestinal:  Negative for abdominal distention, abdominal pain, blood in stool, constipation, diarrhea, nausea and vomiting. Endocrine: Negative for cold intolerance, heat intolerance, polydipsia, polyphagia and polyuria. Genitourinary:  Negative for difficulty urinating, dysuria, frequency, genital sores, hematuria and urgency. Musculoskeletal:  Positive for arthralgias. Negative for back pain, gait problem, joint swelling, myalgias and neck pain. Skin:  Negative for color change and rash. Allergic/Immunologic: Negative for environmental allergies. Neurological:  Negative for dizziness, tremors, seizures, syncope, speech difficulty, weakness, numbness and headaches. Hematological:  Negative for adenopathy. Does not bruise/bleed easily. Psychiatric/Behavioral:  Negative for behavioral problems, confusion, decreased concentration, hallucinations, self-injury, sleep disturbance and suicidal ideas. The patient is not nervous/anxious. Physical Exam  Vitals and nursing note reviewed. Exam conducted with a chaperone present. Constitutional:       General: She is not in acute distress. Appearance: Normal appearance. She is obese. She is not ill-appearing or toxic-appearing. HENT:      Head: Normocephalic and atraumatic. Right Ear: External ear normal.      Left Ear: External ear normal.      Nose: Nose normal. No congestion or rhinorrhea. Mouth/Throat:      Mouth: Mucous membranes are moist.      Pharynx: No oropharyngeal exudate. Eyes:      General: No scleral icterus. Right eye: No discharge. Left eye: No discharge. Extraocular Movements: Extraocular movements intact. Conjunctiva/sclera: Conjunctivae normal.      Pupils: Pupils are equal, round, and reactive to light. Cardiovascular:      Rate and Rhythm: Normal rate and regular rhythm. Pulses: Normal pulses. Heart sounds: Normal heart sounds. No murmur heard. No gallop. Pulmonary:      Effort: Pulmonary effort is normal. No respiratory distress. Breath sounds: Normal breath sounds. No stridor. No wheezing, rhonchi or rales. Chest:      Chest wall: No tenderness. Abdominal:      General: Abdomen is flat. There is no distension. Palpations: Abdomen is soft. There is no mass. Tenderness:  There is no abdominal tenderness. There is no right CVA tenderness, guarding or rebound. Hernia: No hernia is present. Genitourinary:     Vagina: No vaginal discharge. Musculoskeletal:         General: No swelling, tenderness, deformity or signs of injury. Normal range of motion. Cervical back: Normal range of motion and neck supple. No rigidity. Right lower leg: No edema. Left lower leg: No edema. Lymphadenopathy:      Cervical: No cervical adenopathy. Skin:     General: Skin is warm. Capillary Refill: Capillary refill takes less than 2 seconds. Coloration: Skin is not jaundiced or pale. Findings: No bruising, erythema, lesion or rash. Neurological:      General: No focal deficit present. Mental Status: She is alert and oriented to person, place, and time. Mental status is at baseline. Cranial Nerves: No cranial nerve deficit. Motor: No weakness. Coordination: Coordination normal.      Gait: Gait abnormal.   Psychiatric:         Mood and Affect: Mood normal.         Behavior: Behavior normal.      Comments: Mild cognitive impairment        1. Type II diabetes mellitus with neurological manifestations, uncontrolled ([address]  Type 2 diabetes is very common, obesity is the main reason for diabetes and  insulin resistance, most of the type 2 diabetes can be cured by weight management exercise. . Most type 2 diabetes has high insulin level  and high insulin level causes most of diabetic complications microvascular and macrovascular, damage to kidneys, eyes , cardiovascular , and neuropathy,, medications that correct insulin resistance such as metformin has been shown to decrease these complications by lowering insulin level and correcting insulin resistance.   Frequent blood sugar checking is unnecessary    Frequent blood sugar checking is not necessity, normal person without diabetess fasting blood sugar is usually less than 105, after 3 -4 weeks of treatment, either diet alone, or diet and metformin, if fasting blood sugar less than 120, frequent BS checking is not necessary and continue diet exercise Metformin is enough. Starting metformin early and preventing diabetic complications. Exercise and weight management is most important    Adding insulin and continuing increasing dose,  not usually prevent diabetic complications . Some newer medications that do not cause low BS, may help diabeted by supressing apetite and making pee sugar , may help loose weight ,  may be more beneficial when over weight, but are quite expensive and often not covered by insurance, long term benefits are not known, and do have lot of side effects and risks    High blood sugar less than 300 usually causes no symptoms and patient is unaware, of the diabetes, and causes a significant diabetic complications and #1 cause of losing legs , kidneys and eye sight and cardiovascular risk     Focusing on blood sugar does not prevent diabetic complication, but diet, exercise , weight management ,  metformin early on , do prevent diabetic complications    If insulin do become necessary, usually 30-40 unit long acting insulin taken bed time, with small frequent meals may be more beneficial, keeping fasting blood sugar less than 140, through diet , exercise, weight management and metformin- recommended as first line diabetic medication with GFR more than 30 by all most medical organizations, and need be continued with or without insulin. In normal weight persons BMI less than 25, may be insulin deficient and Insulin log acting usually less than 30 units may help , with or without metformin if fasting BS more than 140  Metformin 500 mg twice a day along with insulin 20 units daily, will simplify medications  - Comprehensive Metabolic Panel;  Future  - Hemoglobin A1C; Future  - metFORMIN (GLUCOPHAGE) 500 MG tablet; 1 twice a day, recommended irrespective of any other treatment for diabetes as first-line with GFR over 30 Dispense: 180 tablet; Refill: 5  - losartan (COZAAR) 50 MG tablet; Take 1 tablet by mouth daily  Dispense: 90 tablet; Refill: 1    2. S/p TAVR (transcatheter aortic valve replacement), bioprosthetic  Cardiology follow-up    3. Coronary artery disease involving native coronary artery of native heart without angina pectoris  Optimize medical management high-dose statins recommended along with beta-blocker losartan start half tablet a day continue Plavix  - atorvastatin (LIPITOR) 40 MG tablet; Take 1 tablet by mouth daily  Dispense: 90 tablet; Refill: 5  - clopidogrel (PLAVIX) 75 MG tablet; Take 1 tablet by mouth daily  Dispense: 90 tablet; Refill: 5  - losartan (COZAAR) 50 MG tablet; Take 1 tablet by mouth daily  Dispense: 90 tablet; Refill: 1  - metoprolol succinate (TOPROL XL) 50 MG extended release tablet; Take 1 tablet by mouth daily  Dispense: 30 tablet; Refill: 3    4. Simple chronic bronchitis (HCC)  No COPD exacerbation    5. Infiltrating ductal carcinoma of breast, right (HCC)  Follow-up with heme oncology    6. Post PTCA  Optimize medical management no chest pain angina  - atorvastatin (LIPITOR) 40 MG tablet; Take 1 tablet by mouth daily  Dispense: 90 tablet; Refill: 5  - clopidogrel (PLAVIX) 75 MG tablet; Take 1 tablet by mouth daily  Dispense: 90 tablet; Refill: 5  - losartan (COZAAR) 50 MG tablet; Take 1 tablet by mouth daily  Dispense: 90 tablet; Refill: 1    7. At high risk for falls  Discussed fall precautions    8. Mixed hyperlipidemia  Statins,  cholesterol lowering agents, simvastatin Lipitor pravastatin, has unequivocal evidence of decreased heart attack strokes, long-term benefits,  with very little risks,  side effects, in spite of all the  the negative publicity, strongly recommended, can reduce dose to half pill , not stop. If diabetic and CKD benefit of taking statins are profound, irrespective of baseline LDL , even if less than 70.   High intensity statin therapy is recommended inpatient with stable coronary artery disease history, irrespective of LDL level by [employer] of cardiology     - LDL Cholesterol, Direct; Future  - atorvastatin (LIPITOR) 40 MG tablet; Take 1 tablet by mouth daily  Dispense: 90 tablet; Refill: 5    9. Medication management    - CBC with Auto Differential; Future  - TSH; Future    10. Other chronic pain  Discussed management of pain continue Cymbalta will reduce dose of gabapentin acetaminophen over-the-counter  - DULoxetine (CYMBALTA) 30 MG extended release capsule; Take 1 capsule by mouth daily  Dispense: 90 capsule; Refill: 2   . Increase fruits vegetables, fiber, whole grains, beans, exercise, tree nuts, will decrease risk of heart attacks and strokes, may reduce cancer risks     once a day multivitamin such as Centrum silver store brand, due to benefit of folic acid vitamin D, has already mineral vitamin is recommended doses.   Multiple different vitamins not recommended may carry increased risk, including vitamin E, take foods rich in omega 3 and fibre, pills are not recommended, including omega 3 in high doses, may have increased risks      Stephanie Mchugh MD

## 2022-09-28 ENCOUNTER — CLINICAL DOCUMENTATION (OUTPATIENT)
Dept: CASE MANAGEMENT | Age: 82
End: 2022-09-28

## 2022-09-28 LAB
EST. AVERAGE GLUCOSE BLD GHB EST-MCNC: 260 MG/DL
HBA1C MFR BLD: 10.7 % (ref 4.8–5.6)

## 2022-09-28 NOTE — ACP (ADVANCE CARE PLANNING)
Advance Care Planning   Ambulatory ACP Specialist Patient Outreach    Date:  9/28/2022; 10/10/2022; 10/20/2022    ACP Specialist:  TANAY Lowe    Outreach call to patient in follow-up to ACP Specialist referral from: Henrey Heimlich, MD    [x] PCP  [] Provider   [] Ambulatory Care Management [] Other for Reason:    [x] Advance Directive Assistance  [x] Code Status Discussion  [] Complete Portable DNR Order  [x] Discuss Goals of Care  [] Complete POST/MOST  [x] Early ACP Decision-Making  [] Other    Date Referral Received:9/27/2022    Today's Outreach:  [x] First   [] Second  [] Third                               Third outreach made by []  phone  [] email []   Mohivet     Intervention:  [x] Spoke with Patient  [] Left VM requesting return call      Outcome:SW called and spoke with pt and her  who said that they would like info to review and are interested in moving forward with an ACP conversation. Will follow up in 1-2 weeks       10/10  SW called pt back to see if she and her spouse have had a chance to review ACP info and if they would like to move forward with a conversation. Pt stated her  isn't home and she would like for him to give SW a call back. SW will follow up with pt/spouse in a week if SW hasn't heard from them before then. 10/20  SW called and spoke with pt's  who said that they didn't want to move forward with ACP at this time. SW made sure pt knew that her  would be Floy Countess and they voiced understanding. Advance Care Planning   Healthcare Decision Maker:    Primary Decision Maker: Abimael Sam - Spouse - [phone]    Today we documented Decision Maker(s) consistent with Legal Next of Kin hierarchy.          Next Step:   [] ACP scheduled conversation  [] Outreach again in one             [x] Email / Mail ACP Info Sheets  [x] Email / Mail Advance Directive            [x] Close Referral. Routing closure to referring provider/staff and to ACP Specialist . [] Closure Letter mailed to Patient with Invitation to Contact ACP Specialist if/when ready.     Thank you for this referral.

## 2022-09-30 ENCOUNTER — TELEPHONE (OUTPATIENT)
Dept: PRIMARY CARE CLINIC | Facility: CLINIC | Age: 82
End: 2022-09-30

## 2022-09-30 NOTE — TELEPHONE ENCOUNTER
----- Message from Paradise Correa MD sent at 9/28/2022  8:41 AM EDT -----  Poorly controlled diabetes medication as directed including metformin schedule follow-up 1 month bring all medications

## 2022-10-10 ENCOUNTER — CLINICAL DOCUMENTATION (OUTPATIENT)
Dept: CASE MANAGEMENT | Age: 82
End: 2022-10-10

## 2022-10-20 ENCOUNTER — CLINICAL DOCUMENTATION (OUTPATIENT)
Dept: CASE MANAGEMENT | Age: 82
End: 2022-10-20

## 2022-11-14 ENCOUNTER — TELEPHONE (OUTPATIENT)
Dept: SURGERY | Age: 82
End: 2022-11-14

## 2022-11-14 ENCOUNTER — TELEPHONE (OUTPATIENT)
Dept: CASE MANAGEMENT | Age: 82
End: 2022-11-14

## 2022-11-14 NOTE — TELEPHONE ENCOUNTER
Patient called she states a doctor told her not to have breast surgery and if she did she would likely die. Discussed with patient other options and asked patient if she would come back in and speak with Dr. Isamar Chi again and she consented. She states her spouse Alvin Chandra is her decision maker and he does not have a cell phone and she will have him call me back to schedule an appt with Dr. Isamar Chi. The name of Sreedhar Carrillo is listed as primary consult was also called first and she states she is daughter in law and to call Alvin Chandra as he is her decision maker. Patiens spouse Alvin Chandra called back and states patient has agreed to come and see Dr. Isamar Chi after [place] to discuss potential alternatives to surgery. Patient has  adamantly refused surgery. Appt given for 1-4-23 at 11:15 am with Dr. Isamar Chi.

## 2022-11-14 NOTE — TELEPHONE ENCOUNTER
I called to follow up with the patient to confirm if she is ready to reschedule her Lumpectomy. I spoke to the  and he asked the patient if she is wanting to reschedule. She stated that her doctor told her to \"absolutely not have the surgery. \" The patient stated that she no longer wants the lumpectomy.

## 2022-11-21 ENCOUNTER — HOSPITAL ENCOUNTER (OUTPATIENT)
Age: 82
Setting detail: OBSERVATION
Discharge: HOME | End: 2022-11-23
Attending: STUDENT IN AN ORGANIZED HEALTH CARE EDUCATION/TRAINING PROGRAM | Admitting: STUDENT IN AN ORGANIZED HEALTH CARE EDUCATION/TRAINING PROGRAM
Payer: MEDICARE

## 2022-11-21 ENCOUNTER — APPOINTMENT (OUTPATIENT)
Dept: CT IMAGING | Age: 82
End: 2022-11-21
Payer: MEDICARE

## 2022-11-21 ENCOUNTER — APPOINTMENT (OUTPATIENT)
Dept: GENERAL RADIOLOGY | Age: 82
End: 2022-11-21
Payer: MEDICARE

## 2022-11-21 DIAGNOSIS — R55 SYNCOPE AND COLLAPSE: Primary | ICD-10-CM

## 2022-11-21 DIAGNOSIS — S09.90XA CLOSED HEAD INJURY, INITIAL ENCOUNTER: ICD-10-CM

## 2022-11-21 PROBLEM — Z79.4 TYPE 2 DIABETES MELLITUS WITH DIABETIC NEPHROPATHY, WITH LONG-TERM CURRENT USE OF INSULIN (HCC): Chronic | Status: ACTIVE | Noted: 2019-05-14

## 2022-11-21 PROBLEM — G63 POLYNEUROPATHY ASSOCIATED WITH UNDERLYING DISEASE (HCC): Status: ACTIVE | Noted: 2017-09-29

## 2022-11-21 PROBLEM — Z95.3 S/P TAVR (TRANSCATHETER AORTIC VALVE REPLACEMENT), BIOPROSTHETIC: Status: ACTIVE | Noted: 2021-11-24

## 2022-11-21 PROBLEM — F33.9 RECURRENT DEPRESSION (HCC): Status: ACTIVE | Noted: 2018-01-03

## 2022-11-21 PROBLEM — I25.10 CORONARY ARTERY DISEASE INVOLVING NATIVE CORONARY ARTERY OF NATIVE HEART: Status: ACTIVE | Noted: 2021-10-11

## 2022-11-21 PROBLEM — Z91.81 AT HIGH RISK FOR FALLS: Status: ACTIVE | Noted: 2021-05-13

## 2022-11-21 PROBLEM — I50.32 CHRONIC HEART FAILURE WITH PRESERVED EJECTION FRACTION (HFPEF) (HCC): Chronic | Status: ACTIVE | Noted: 2022-11-21

## 2022-11-21 PROBLEM — E11.21 TYPE 2 DIABETES MELLITUS WITH DIABETIC NEPHROPATHY, WITH LONG-TERM CURRENT USE OF INSULIN (HCC): Chronic | Status: ACTIVE | Noted: 2019-05-14

## 2022-11-21 PROBLEM — B37.0 THRUSH, ORAL: Status: ACTIVE | Noted: 2022-11-21

## 2022-11-21 LAB
ALBUMIN SERPL-MCNC: 3.4 G/DL (ref 3.2–4.6)
ALBUMIN/GLOB SERPL: 0.9 {RATIO} (ref 0.4–1.6)
ALP SERPL-CCNC: 141 U/L (ref 50–136)
ALT SERPL-CCNC: 17 U/L (ref 12–65)
ANION GAP SERPL CALC-SCNC: 6 MMOL/L (ref 2–11)
APPEARANCE UR: ABNORMAL
AST SERPL-CCNC: 11 U/L (ref 15–37)
BACTERIA URNS QL MICRO: 0 /HPF
BASOPHILS # BLD: 0 K/UL (ref 0–0.2)
BASOPHILS NFR BLD: 0 % (ref 0–2)
BILIRUB SERPL-MCNC: 0.6 MG/DL (ref 0.2–1.1)
BILIRUB UR QL: NEGATIVE
BUN SERPL-MCNC: 10 MG/DL (ref 8–23)
CALCIUM SERPL-MCNC: 9.6 MG/DL (ref 8.3–10.4)
CHLORIDE SERPL-SCNC: 104 MMOL/L (ref 101–110)
CO2 SERPL-SCNC: 29 MMOL/L (ref 21–32)
COLOR UR: ABNORMAL
CREAT SERPL-MCNC: 0.9 MG/DL (ref 0.6–1)
D DIMER PPP FEU-MCNC: 0.51 UG/ML(FEU)
DIFFERENTIAL METHOD BLD: ABNORMAL
EKG ATRIAL RATE: 72 BPM
EKG DIAGNOSIS: NORMAL
EKG P AXIS: 60 DEGREES
EKG P-R INTERVAL: 194 MS
EKG Q-T INTERVAL: 419 MS
EKG QRS DURATION: 101 MS
EKG QTC CALCULATION (BAZETT): 459 MS
EKG R AXIS: -62 DEGREES
EKG T AXIS: 59 DEGREES
EKG VENTRICULAR RATE: 72 BPM
EOSINOPHIL # BLD: 0.1 K/UL (ref 0–0.8)
EOSINOPHIL NFR BLD: 1 % (ref 0.5–7.8)
ERYTHROCYTE [DISTWIDTH] IN BLOOD BY AUTOMATED COUNT: 12.3 % (ref 11.9–14.6)
GLOBULIN SER CALC-MCNC: 3.7 G/DL (ref 2.8–4.5)
GLUCOSE BLD STRIP.AUTO-MCNC: 262 MG/DL (ref 65–100)
GLUCOSE BLD STRIP.AUTO-MCNC: 342 MG/DL (ref 65–100)
GLUCOSE SERPL-MCNC: 320 MG/DL (ref 65–100)
GLUCOSE UR STRIP.AUTO-MCNC: >1000 MG/DL
HCT VFR BLD AUTO: 36.9 % (ref 35.8–46.3)
HGB BLD-MCNC: 12.9 G/DL (ref 11.7–15.4)
HGB UR QL STRIP: NEGATIVE
IMM GRANULOCYTES # BLD AUTO: 0 K/UL (ref 0–0.5)
IMM GRANULOCYTES NFR BLD AUTO: 0 % (ref 0–5)
KETONES UR QL STRIP.AUTO: NEGATIVE MG/DL
LEUKOCYTE ESTERASE UR QL STRIP.AUTO: ABNORMAL
LYMPHOCYTES # BLD: 0.8 K/UL (ref 0.5–4.6)
LYMPHOCYTES NFR BLD: 9 % (ref 13–44)
MAGNESIUM SERPL-MCNC: 1.8 MG/DL (ref 1.8–2.4)
MCH RBC QN AUTO: 32 PG (ref 26.1–32.9)
MCHC RBC AUTO-ENTMCNC: 35 G/DL (ref 31.4–35)
MCV RBC AUTO: 91.6 FL (ref 82–102)
MONOCYTES # BLD: 0.5 K/UL (ref 0.1–1.3)
MONOCYTES NFR BLD: 5 % (ref 4–12)
NEUTS SEG # BLD: 7.7 K/UL (ref 1.7–8.2)
NEUTS SEG NFR BLD: 85 % (ref 43–78)
NITRITE UR QL STRIP.AUTO: NEGATIVE
NRBC # BLD: 0 K/UL (ref 0–0.2)
OTHER OBSERVATIONS: ABNORMAL
PH UR STRIP: 6.5 [PH] (ref 5–9)
PLATELET # BLD AUTO: 150 K/UL (ref 150–450)
PMV BLD AUTO: 10.4 FL (ref 9.4–12.3)
POTASSIUM SERPL-SCNC: 4.3 MMOL/L (ref 3.5–5.1)
PROT SERPL-MCNC: 7.1 G/DL (ref 6.3–8.2)
PROT UR STRIP-MCNC: NEGATIVE MG/DL
RBC # BLD AUTO: 4.03 M/UL (ref 4.05–5.2)
RBC #/AREA URNS HPF: ABNORMAL /HPF
SERVICE CMNT-IMP: ABNORMAL
SERVICE CMNT-IMP: ABNORMAL
SODIUM SERPL-SCNC: 139 MMOL/L (ref 133–143)
SP GR UR REFRACTOMETRY: 1.02 (ref 1–1.02)
TROPONIN I SERPL HS-MCNC: 7.9 PG/ML (ref 0–14)
UROBILINOGEN UR QL STRIP.AUTO: 1 EU/DL (ref 0.2–1)
WBC # BLD AUTO: 9.1 K/UL (ref 4.3–11.1)

## 2022-11-21 PROCEDURE — 85025 COMPLETE CBC W/AUTO DIFF WBC: CPT

## 2022-11-21 PROCEDURE — 93005 ELECTROCARDIOGRAM TRACING: CPT | Performed by: STUDENT IN AN ORGANIZED HEALTH CARE EDUCATION/TRAINING PROGRAM

## 2022-11-21 PROCEDURE — 71045 X-RAY EXAM CHEST 1 VIEW: CPT

## 2022-11-21 PROCEDURE — 96365 THER/PROPH/DIAG IV INF INIT: CPT

## 2022-11-21 PROCEDURE — 83735 ASSAY OF MAGNESIUM: CPT

## 2022-11-21 PROCEDURE — 84484 ASSAY OF TROPONIN QUANT: CPT

## 2022-11-21 PROCEDURE — 6360000002 HC RX W HCPCS: Performed by: HOSPITALIST

## 2022-11-21 PROCEDURE — 73060 X-RAY EXAM OF HUMERUS: CPT

## 2022-11-21 PROCEDURE — 73030 X-RAY EXAM OF SHOULDER: CPT

## 2022-11-21 PROCEDURE — 96372 THER/PROPH/DIAG INJ SC/IM: CPT

## 2022-11-21 PROCEDURE — 72070 X-RAY EXAM THORAC SPINE 2VWS: CPT

## 2022-11-21 PROCEDURE — 73110 X-RAY EXAM OF WRIST: CPT

## 2022-11-21 PROCEDURE — 80053 COMPREHEN METABOLIC PANEL: CPT

## 2022-11-21 PROCEDURE — 81001 URINALYSIS AUTO W/SCOPE: CPT

## 2022-11-21 PROCEDURE — 6370000000 HC RX 637 (ALT 250 FOR IP): Performed by: FAMILY MEDICINE

## 2022-11-21 PROCEDURE — 94762 N-INVAS EAR/PLS OXIMTRY CONT: CPT

## 2022-11-21 PROCEDURE — 99285 EMERGENCY DEPT VISIT HI MDM: CPT

## 2022-11-21 PROCEDURE — 72125 CT NECK SPINE W/O DYE: CPT

## 2022-11-21 PROCEDURE — 70450 CT HEAD/BRAIN W/O DYE: CPT

## 2022-11-21 PROCEDURE — 6360000002 HC RX W HCPCS: Performed by: FAMILY MEDICINE

## 2022-11-21 PROCEDURE — G0378 HOSPITAL OBSERVATION PER HR: HCPCS

## 2022-11-21 PROCEDURE — 2580000003 HC RX 258: Performed by: FAMILY MEDICINE

## 2022-11-21 PROCEDURE — 82962 GLUCOSE BLOOD TEST: CPT

## 2022-11-21 PROCEDURE — 96366 THER/PROPH/DIAG IV INF ADDON: CPT

## 2022-11-21 PROCEDURE — 85379 FIBRIN DEGRADATION QUANT: CPT

## 2022-11-21 RX ORDER — ENOXAPARIN SODIUM 100 MG/ML
40 INJECTION SUBCUTANEOUS DAILY
Status: DISCONTINUED | OUTPATIENT
Start: 2022-11-21 | End: 2022-11-23 | Stop reason: HOSPADM

## 2022-11-21 RX ORDER — ONDANSETRON 4 MG/1
4 TABLET, ORALLY DISINTEGRATING ORAL EVERY 8 HOURS PRN
Status: DISCONTINUED | OUTPATIENT
Start: 2022-11-21 | End: 2022-11-23 | Stop reason: HOSPADM

## 2022-11-21 RX ORDER — INSULIN GLARGINE 100 [IU]/ML
0.25 INJECTION, SOLUTION SUBCUTANEOUS NIGHTLY
Status: DISCONTINUED | OUTPATIENT
Start: 2022-11-21 | End: 2022-11-22

## 2022-11-21 RX ORDER — ACETAMINOPHEN 325 MG/1
650 TABLET ORAL EVERY 6 HOURS PRN
Status: DISCONTINUED | OUTPATIENT
Start: 2022-11-21 | End: 2022-11-23 | Stop reason: HOSPADM

## 2022-11-21 RX ORDER — SODIUM CHLORIDE 9 MG/ML
INJECTION, SOLUTION INTRAVENOUS PRN
Status: DISCONTINUED | OUTPATIENT
Start: 2022-11-21 | End: 2022-11-23 | Stop reason: HOSPADM

## 2022-11-21 RX ORDER — PANTOPRAZOLE SODIUM 40 MG/1
40 TABLET, DELAYED RELEASE ORAL
Status: DISCONTINUED | OUTPATIENT
Start: 2022-11-22 | End: 2022-11-23 | Stop reason: HOSPADM

## 2022-11-21 RX ORDER — DEXTROSE MONOHYDRATE 100 MG/ML
INJECTION, SOLUTION INTRAVENOUS CONTINUOUS PRN
Status: DISCONTINUED | OUTPATIENT
Start: 2022-11-21 | End: 2022-11-23 | Stop reason: HOSPADM

## 2022-11-21 RX ORDER — ATORVASTATIN CALCIUM 40 MG/1
40 TABLET, FILM COATED ORAL DAILY
Status: DISCONTINUED | OUTPATIENT
Start: 2022-11-22 | End: 2022-11-23 | Stop reason: HOSPADM

## 2022-11-21 RX ORDER — INSULIN LISPRO 100 [IU]/ML
0-8 INJECTION, SOLUTION INTRAVENOUS; SUBCUTANEOUS
Status: DISCONTINUED | OUTPATIENT
Start: 2022-11-21 | End: 2022-11-23 | Stop reason: HOSPADM

## 2022-11-21 RX ORDER — HALOPERIDOL 5 MG/ML
4 INJECTION INTRAMUSCULAR ONCE
Status: COMPLETED | OUTPATIENT
Start: 2022-11-21 | End: 2022-11-21

## 2022-11-21 RX ORDER — SODIUM CHLORIDE 0.9 % (FLUSH) 0.9 %
5-40 SYRINGE (ML) INJECTION PRN
Status: DISCONTINUED | OUTPATIENT
Start: 2022-11-21 | End: 2022-11-23 | Stop reason: HOSPADM

## 2022-11-21 RX ORDER — DULOXETIN HYDROCHLORIDE 30 MG/1
30 CAPSULE, DELAYED RELEASE ORAL DAILY
Status: DISCONTINUED | OUTPATIENT
Start: 2022-11-22 | End: 2022-11-23 | Stop reason: HOSPADM

## 2022-11-21 RX ORDER — POLYETHYLENE GLYCOL 3350 17 G/17G
17 POWDER, FOR SOLUTION ORAL DAILY PRN
Status: DISCONTINUED | OUTPATIENT
Start: 2022-11-21 | End: 2022-11-23 | Stop reason: HOSPADM

## 2022-11-21 RX ORDER — LOSARTAN POTASSIUM 50 MG/1
50 TABLET ORAL DAILY
Status: DISCONTINUED | OUTPATIENT
Start: 2022-11-22 | End: 2022-11-23 | Stop reason: HOSPADM

## 2022-11-21 RX ORDER — MAGNESIUM HYDROXIDE/ALUMINUM HYDROXICE/SIMETHICONE 120; 1200; 1200 MG/30ML; MG/30ML; MG/30ML
30 SUSPENSION ORAL EVERY 6 HOURS PRN
Status: DISCONTINUED | OUTPATIENT
Start: 2022-11-21 | End: 2022-11-23 | Stop reason: HOSPADM

## 2022-11-21 RX ORDER — ALBUTEROL SULFATE 2.5 MG/3ML
2.5 SOLUTION RESPIRATORY (INHALATION) EVERY 6 HOURS PRN
Status: DISCONTINUED | OUTPATIENT
Start: 2022-11-21 | End: 2022-11-23 | Stop reason: HOSPADM

## 2022-11-21 RX ORDER — METOPROLOL SUCCINATE 50 MG/1
50 TABLET, EXTENDED RELEASE ORAL DAILY
Status: DISCONTINUED | OUTPATIENT
Start: 2022-11-22 | End: 2022-11-23 | Stop reason: HOSPADM

## 2022-11-21 RX ORDER — MAGNESIUM SULFATE IN WATER 40 MG/ML
2000 INJECTION, SOLUTION INTRAVENOUS PRN
Status: DISCONTINUED | OUTPATIENT
Start: 2022-11-21 | End: 2022-11-22

## 2022-11-21 RX ORDER — ONDANSETRON 2 MG/ML
4 INJECTION INTRAMUSCULAR; INTRAVENOUS EVERY 6 HOURS PRN
Status: DISCONTINUED | OUTPATIENT
Start: 2022-11-21 | End: 2022-11-23 | Stop reason: HOSPADM

## 2022-11-21 RX ORDER — CLOTRIMAZOLE 10 MG/1
10 LOZENGE ORAL; TOPICAL
Status: DISCONTINUED | OUTPATIENT
Start: 2022-11-21 | End: 2022-11-22

## 2022-11-21 RX ORDER — POTASSIUM CHLORIDE 20 MEQ/1
40 TABLET, EXTENDED RELEASE ORAL PRN
Status: DISCONTINUED | OUTPATIENT
Start: 2022-11-21 | End: 2022-11-22

## 2022-11-21 RX ORDER — INSULIN LISPRO 100 [IU]/ML
0-4 INJECTION, SOLUTION INTRAVENOUS; SUBCUTANEOUS NIGHTLY
Status: DISCONTINUED | OUTPATIENT
Start: 2022-11-21 | End: 2022-11-23 | Stop reason: HOSPADM

## 2022-11-21 RX ORDER — SODIUM CHLORIDE 0.9 % (FLUSH) 0.9 %
5-40 SYRINGE (ML) INJECTION EVERY 12 HOURS SCHEDULED
Status: DISCONTINUED | OUTPATIENT
Start: 2022-11-21 | End: 2022-11-23 | Stop reason: HOSPADM

## 2022-11-21 RX ORDER — POTASSIUM CHLORIDE 7.45 MG/ML
10 INJECTION INTRAVENOUS PRN
Status: DISCONTINUED | OUTPATIENT
Start: 2022-11-21 | End: 2022-11-22

## 2022-11-21 RX ORDER — GABAPENTIN 300 MG/1
600 CAPSULE ORAL 2 TIMES DAILY PRN
Status: DISCONTINUED | OUTPATIENT
Start: 2022-11-21 | End: 2022-11-23 | Stop reason: HOSPADM

## 2022-11-21 RX ORDER — ACETAMINOPHEN 650 MG/1
650 SUPPOSITORY RECTAL EVERY 6 HOURS PRN
Status: DISCONTINUED | OUTPATIENT
Start: 2022-11-21 | End: 2022-11-23 | Stop reason: HOSPADM

## 2022-11-21 RX ORDER — MAGNESIUM SULFATE IN WATER 40 MG/ML
2000 INJECTION, SOLUTION INTRAVENOUS ONCE
Status: COMPLETED | OUTPATIENT
Start: 2022-11-21 | End: 2022-11-21

## 2022-11-21 RX ORDER — CLOPIDOGREL BISULFATE 75 MG/1
75 TABLET ORAL DAILY
Status: DISCONTINUED | OUTPATIENT
Start: 2022-11-22 | End: 2022-11-23 | Stop reason: HOSPADM

## 2022-11-21 RX ADMIN — INSULIN GLARGINE 17 UNITS: 100 INJECTION, SOLUTION SUBCUTANEOUS at 21:27

## 2022-11-21 RX ADMIN — INSULIN LISPRO 4 UNITS: 100 INJECTION, SOLUTION INTRAVENOUS; SUBCUTANEOUS at 21:28

## 2022-11-21 RX ADMIN — SODIUM CHLORIDE, PRESERVATIVE FREE 10 ML: 5 INJECTION INTRAVENOUS at 21:41

## 2022-11-21 RX ADMIN — ENOXAPARIN SODIUM 40 MG: 100 INJECTION SUBCUTANEOUS at 18:31

## 2022-11-21 RX ADMIN — INSULIN LISPRO 4 UNITS: 100 INJECTION, SOLUTION INTRAVENOUS; SUBCUTANEOUS at 18:33

## 2022-11-21 RX ADMIN — CLOTRIMAZOLE 10 MG: 10 LOZENGE ORAL; TOPICAL at 18:32

## 2022-11-21 RX ADMIN — MAGNESIUM SULFATE HEPTAHYDRATE 2000 MG: 40 INJECTION, SOLUTION INTRAVENOUS at 18:31

## 2022-11-21 RX ADMIN — HALOPERIDOL LACTATE 4 MG: 5 INJECTION, SOLUTION INTRAMUSCULAR at 21:41

## 2022-11-21 ASSESSMENT — PAIN SCALES - GENERAL: PAINLEVEL_OUTOF10: 5

## 2022-11-21 ASSESSMENT — PAIN - FUNCTIONAL ASSESSMENT: PAIN_FUNCTIONAL_ASSESSMENT: 0-10

## 2022-11-21 NOTE — ASSESSMENT & PLAN NOTE
Remote tele   Possibly post prandially hypotension from DM autonomic/polyneuropathy vs non convulsive seizures or adverse effects of drugs such as TCA.     Check orthostatics   Fall, seizure precautions, PT/OT, PPD   Check spot EEG   Consult neurology

## 2022-11-21 NOTE — ED TRIAGE NOTES
Arrived to er via ems from home. Fall from standing stance due to syncope, usually with drinking and swallowing. Pain in left scapula, left wrist and right side head hematoma. Is on plavix. Is a known diabetic, and left sided breast cancer.

## 2022-11-21 NOTE — ED PROVIDER NOTES
Emergency Department Provider Note                   PCP:                Chepe Menard MD               Age: 81 y.o. Sex: female     No diagnosis found. DISPOSITION          MDM  Number of Diagnoses or Management Options  Closed head injury, initial encounter: new, needed workup  Syncope and collapse: new, needed workup  Diagnosis management comments: 81-year-old female patient presenting with recurrent syncope. Patient reports recurrent syncope when eating or drinking. She estimates at least 6 separate occurrences. Labs thus far performed today are stable, EKG reassuring, CT imaging of the head and neck are negative. Plain film imaging of the left upper extremity and thoracic spine are stable as well. Patient's  is now at bedside, estimates 10+ episodes of syncope without identifiable cause. He states that patient has never been fully evaluated for this issue. He voiced concern to take patient home if she continues to have these episodes. I will speak to the hospitalist concerning admission. Amount and/or Complexity of Data Reviewed  Clinical lab tests: reviewed and ordered  Tests in the radiology section of CPT®: ordered and reviewed  Tests in the medicine section of CPT®: ordered and reviewed  Discuss the patient with other providers: yes  Independent visualization of images, tracings, or specimens: yes    Risk of Complications, Morbidity, and/or Mortality  Presenting problems: moderate  Diagnostic procedures: low  Management options: moderate    Patient Progress  Patient progress: stable       ED Course as of 11/21/22 1437   Mon Nov 21, 2022   1205 EKG interpretation: Sinus rhythm, rate of 72, leftward axis, no ischemia.  [BR]      ED Course User Index  [BR] Jack Han DO        Orders Placed This Encounter   Procedures    XR CHEST PORTABLE    XR SHOULDER LEFT (MIN 2 VIEWS)    XR HUMERUS LEFT (MIN 2 VIEWS)    CT HEAD WO CONTRAST    XR WRIST LEFT (MIN 3 VIEWS) XR THORACIC SPINE (2 VIEWS)    CT CERVICAL SPINE WO CONTRAST    CBC with Auto Differential    Comprehensive Metabolic Panel    Magnesium    Troponin    Urinalysis w rflx microscopic    D-Dimer, Quantitative    Cardiac Monitor - ED Only    Continuous Pulse Oximetry    Orthostatic blood pressure and pulse    EKG 12 Lead        Medications - No data to display    New Prescriptions    No medications on file        Washington Dumont is a 81 y.o. female who presents to the Emergency Department with chief complaint of    Chief Complaint   Patient presents with    Loss of Consciousness      81year-old female patient presenting to this department with reports of recurrent episodes of lightheadedness dizziness and syncope. Patient states has been ongoing for quite some time. She estimates approximately 6 separate episodes where this is occurred. She states this typically happens when she is eating. When she sits down to eat, she states she starts to eat and the becomes progressively lightheaded and loses consciousness. There is no associated nausea vomiting or choking. She denies significant shortness of breath chest pain pressure or tightness. Patient denies associated fever, chills, changes in bowel or bladder habits. There is been no recent nausea vomiting or diarrhea present. Patient arrives with her spouse who is at bedside and confirms these episodes stating that patient does not fully lose consciousness. The history is provided by the patient and the spouse. No  was used. Review of Systems   Constitutional:  Negative for activity change, appetite change, chills, fatigue and fever. HENT:  Negative for congestion, ear discharge, ear pain, rhinorrhea and sore throat. Eyes:  Negative for pain, discharge, redness, itching and visual disturbance. Respiratory:  Negative for apnea, cough, chest tightness, shortness of breath and wheezing.     Cardiovascular:  Negative for chest pain, palpitations and leg swelling. Gastrointestinal:  Negative for abdominal distention, abdominal pain, anal bleeding, diarrhea, nausea and vomiting. Genitourinary:  Negative for difficulty urinating, dysuria, flank pain, frequency, hematuria, pelvic pain, vaginal bleeding and vaginal discharge. Musculoskeletal:  Negative for arthralgias, back pain, myalgias, neck pain and neck stiffness. Skin:  Negative for color change, pallor, rash and wound. Neurological:  Positive for dizziness, syncope and light-headedness. Negative for tremors, facial asymmetry, weakness, numbness and headaches. Psychiatric/Behavioral:  Negative for agitation, behavioral problems, confusion, self-injury and suicidal ideas. The patient is not nervous/anxious. All other systems reviewed and are negative.     Past Medical History:   Diagnosis Date    Anatomical narrow angle borderline glaucoma 6/10/2015    Blindness of left eye     Cat bite of finger     Chronic angle-closure glaucoma 6/10/2015    COPD (chronic obstructive pulmonary disease) ([facility] [address] 06/10/2015    managed by family dr    Coronary artery disease involving native coronary artery of native heart 10/11/2021    stents x 2; no CP no SOB     Cortical, lamellar, or zonular cataract, nonsenile 6/10/2015    COVID-19 vaccine series completed 05/14/2021    J&J vaccine one dose regimen    Dementia ([facility] [address]     medications     Diabetes ([facility] [address]     Diverticulosis     Gastrointestinal disorder     diverticultits x 2 episodes    GERD (gastroesophageal reflux disease) 06/10/2015    controlled     Hyperlipemia 6/10/2015    Hypertension 06/10/2015    no meds at this time: BP is 111/50 as of 11/22/2021     Memory loss 06/10/2015    memantine     Menopause     Methicillin resistant Staphylococcus aureus in conditions classified elsewhere and of unspecified site     Nearsightedness 6/10/2015    Neuropathy 06/10/2015    gabapentin    Nuclear cataract, nonsenile 6/10/2015    Obesity     Optic atrophy, 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    Osteoarthritis     Osteoarthrosis involving, or with mention of more than one site, but not specified as generalized, site unspecified(715.80) 6/10/2015    Other and combined forms of senile cataract 6/10/2015    Other ill-defined conditions(799.89)     back pain    Pain in limb     Psychiatric disorder     depression; memory issues     Regular astigmatism 6/10/2015    Severe stage glaucoma 06/10/2015    Stroke ([facility] [address]     over 20 years ago; blind in right eye due to stroke     Type II or unspecified type diabetes mellitus with neurological manifestations, not stated as uncontrolled(250.60) 06/10/2015    does not do fbs checks; today 12/8/20 fbs 250; Hgb A1c: 9.3 on 9/3/54    Umbilical hernia     no repair        Past Surgical History:   Procedure Laterality Date    APPENDECTOMY      BREAST BIOPSY Right 05/16/2022    u/s: breast and axilla    CARDIAC CATHETERIZATION      x 2 stents     CHOLECYSTECTOMY  2000    HEMORRHOID SURGERY  1982    HYSTERECTOMY (CERVIX STATUS UNKNOWN)      20s    OTHER SURGICAL HISTORY  2/2012    removed blood clot from her anus    OTHER SURGICAL HISTORY  1978    benign growth removal from vocal cords    OTHER SURGICAL HISTORY      full dentures r/t tooth extractions    OTHER SURGICAL HISTORY  2001    both eyes optic nerve    OTHER SURGICAL HISTORY  4/2012    lipoma removal    OTHER SURGICAL HISTORY  1978    exploratory    POLYPECTOMY  1992    TONSILLECTOMY      US BREAST NEEDLE BIOPSY RIGHT Right 5/16/2022    US BREAST NEEDLE BIOPSY RIGHT 5/16/2022 [facility]    US LYMPH NODE BIOPSY  5/16/2022    US LYMPH NODE BIOPSY 5/16/2022 [facility]        Family History   Problem Relation Age of Onset    Heart Disease Maternal Grandfather     Cancer Paternal Uncle         throat cancer    Cancer Paternal Grandfather         throat cancer    Other Other         clogged arteries diverticulitis    Breast Cancer Sister 72    Breast Cancer Daughter 62 Diabetes Mother     Stroke Mother     Hypertension Mother     Diabetes Father     Heart Disease Father     Hypertension Father     Other Maternal Lexis Dubois tumors, cyst in kidneys    Diabetes Maternal Uncle     Cancer Paternal Aunt         lung    COPD Paternal Aunt     Diabetes Maternal Grandfather         Social History     Socioeconomic History    Marital status:    Tobacco Use    Smoking status: Former     Packs/day: 1.00     Types: Cigarettes     Quit date: 2000     Years since quittin.8    Smokeless tobacco: Never   Substance and Sexual Activity    Alcohol use: No    Drug use: No         Meperidine and Morphine     Previous Medications    ALBUTEROL SULFATE HFA (PROVENTIL;VENTOLIN;PROAIR) 108 (90 BASE) MCG/ACT INHALER    Inhale 1 puff into the lungs 3 times daily as needed    AMITRIPTYLINE (ELAVIL) 25 MG TABLET    TAKE 1 TABLET BY MOUTH NIGHTLY.  FOR SLEEP AND DEPRESSION    ASPIRIN 81 MG CHEWABLE TABLET    Take 81 mg by mouth daily    ATORVASTATIN (LIPITOR) 40 MG TABLET    Take 1 tablet by mouth daily    CLOPIDOGREL (PLAVIX) 75 MG TABLET    Take 1 tablet by mouth daily    DULOXETINE (CYMBALTA) 30 MG EXTENDED RELEASE CAPSULE    Take 1 capsule by mouth daily    EMPAGLIFLOZIN-LINAGLIPTIN (GLYXAMBI) 25-5 MG TABS    1 per day for diabetes    GABAPENTIN (NEURONTIN) 600 MG TABLET    1 tablet by mouth twice daily    HYDROCORTISONE ACETATE (PROCTOCORT) 10 % EXTERNAL FOAM    Place 1 applicator rectally every other day    HYDROXYZINE (ATARAX) 25 MG TABLET    1-2 every 8-12hours prn anxiety/sleep    INSULIN DETEMIR (LEVEMIR FLEXTOUCH) 100 UNIT/ML INJECTION PEN    20 units daily am    LANCETS MISC    To be used with dispensed glucometer to test 5 times daily    LOSARTAN (COZAAR) 50 MG TABLET    Take 1 tablet by mouth daily    METFORMIN (GLUCOPHAGE) 500 MG TABLET    1 twice a day, recommended irrespective of any other treatment for diabetes as first-line with GFR over 30    METOPROLOL SUCCINATE (TOPROL XL) 50 MG EXTENDED RELEASE TABLET    Take 1 tablet by mouth daily    MULTIPLE VITAMINS-MINERALS (MULTIVITAMIN WITH MINERALS) TABLET    Once daily OTC    OMEPRAZOLE (PRILOSEC) 20 MG DELAYED RELEASE CAPSULE    Take 1 capsule by mouth in the morning. For heartburn/refluxTAKE 1 CAPSULE BY MOUTH EVERY DAY. POLYETHYLENE GLYCOL (GLYCOLAX) 17 GM/SCOOP POWDER    Take 17 g by mouth 2 times daily        Vitals signs and nursing note reviewed. Patient Vitals for the past 4 hrs:   Temp Pulse Resp BP SpO2   11/21/22 1430 -- 68 17 (!) 141/65 99 %   11/21/22 1415 -- 65 13 (!) 149/137 97 %   11/21/22 1400 -- 64 (!) 9 (!) 137/53 99 %   11/21/22 1345 -- 64 16 131/65 98 %   11/21/22 1330 -- 59 16 (!) 157/101 98 %   11/21/22 1315 -- 76 18 (!) 136/118 97 %   11/21/22 1210 97.9 °F (36.6 °C) 73 16 (!) 142/63 98 %   11/21/22 1200 -- 71 (!) 5 (!) 142/63 97 %   11/21/22 1145 -- 71 12 (!) 148/65 99 %   11/21/22 1135 97.9 °F (36.6 °C) 72 16 -- 99 %          Physical Exam  Vitals and nursing note reviewed. Constitutional:       General: She is not in acute distress. Appearance: Normal appearance. She is normal weight. She is not ill-appearing or toxic-appearing. Comments: Generally well-appearing, alert and oriented x4. No acute distress, speaks in clear, fluid sentences. HENT:      Head: Normocephalic and atraumatic. Right Ear: External ear normal.      Left Ear: External ear normal.      Nose: Nose normal.      Mouth/Throat:      Mouth: Mucous membranes are moist.   Eyes:      General: No scleral icterus. Right eye: No discharge. Left eye: No discharge. Extraocular Movements: Extraocular movements intact. Cardiovascular:      Rate and Rhythm: Normal rate and regular rhythm. Pulses: Normal pulses. Heart sounds: Normal heart sounds. Pulmonary:      Effort: Pulmonary effort is normal. No tachypnea, bradypnea, accessory muscle usage, prolonged expiration or respiratory distress. Breath sounds: Normal breath sounds and air entry. No stridor. No decreased breath sounds, wheezing, rhonchi or rales. Abdominal:      General: Abdomen is flat. There is no distension. Palpations: There is no mass. Tenderness: There is no abdominal tenderness. There is no right CVA tenderness, left CVA tenderness, guarding or rebound. Negative signs include Mukherjee's sign and McBurney's sign. Hernia: No hernia is present. Musculoskeletal:         General: No swelling, tenderness or deformity. Normal range of motion. Cervical back: Normal range of motion. Skin:     General: Skin is warm. Capillary Refill: Capillary refill takes less than 2 seconds. Neurological:      General: No focal deficit present. Mental Status: She is alert. Psychiatric:         Mood and Affect: Mood normal.        Procedures    Results for orders placed or performed during the hospital encounter of 11/21/22   XR CHEST PORTABLE    Narrative    CHEST X-RAY, 1 VIEW    INDICATION: Syncopal    COMPARISON: None available    TECHNIQUE: Single AP view of the chest was obtained. FINDINGS:     Lungs and pleural spaces: Normal. No pneumothorax or pleural effusion. Cardiomediastinal silhouette: Normal.    Osseous structures: Normal.        Impression    No radiographic evidence of acute cardiopulmonary disease. XR SHOULDER LEFT (MIN 2 VIEWS)    Narrative    Left shoulder radiographs, 3 views  Left humerus x-rays, 2 views    INDICATION: Fall    COMPARISON: None available. FINDINGS:  No acute fracture or dislocation. Normal alignment is maintained. Moderate  glenohumeral and acromioclavicular osteoarthritic changes. The soft tissues have  a normal radiographic appearance. Impression    No acute radiographic abnormality of the left shoulder or humerus.    XR HUMERUS LEFT (MIN 2 VIEWS)    Narrative    Left shoulder radiographs, 3 views  Left humerus x-rays, 2 views    INDICATION: Fall    COMPARISON: None available. FINDINGS:  No acute fracture or dislocation. Normal alignment is maintained. Moderate  glenohumeral and acromioclavicular osteoarthritic changes. The soft tissues have  a normal radiographic appearance. Impression    No acute radiographic abnormality of the left shoulder or humerus. CT HEAD WO CONTRAST    Narrative    EXAM: CT HEAD WITHOUT CONTRAST    INDICATION: fall. COMPARISON: 9/1/2019     TECHNIQUE: Contiguous axial images were obtained from the skull base through the  vertex without IV contrast. Radiation dose reduction techniques were used for  this study. Our CT scanners use one or all of the following: Automated exposure  control, adjustment of the mA and/or kV according to patient size, iterative  reconstruction. FINDINGS:   Global parenchymal volume loss and ex vacuo ventriculomegaly. Periventricular  white matter hypoattenuation reflects sequelae of small vessel ischemic disease. No hydrocephalus or midline shift. No extra-axial mass or hemorrhage. The basal  cisterns are patent. The visualized portions of the orbits are normal. The mastoid air cells and  paranasal sinuses are patent. The visualized vascular structures have an unremarkable noncontrast appearance. Right frontal scalp hematoma. Underlying calvarium is intact. Incidental  hyperostosis frontalis externa. Impression    Right frontal scalp hematoma. No acute intracranial abnormality. .     XR WRIST LEFT (MIN 3 VIEWS)    Narrative    Left wrist radiographs, 3 views    INDICATION: Fall    COMPARISON: None. FINDINGS:  No acute fracture or dislocation. Normal alignment is maintained. Arteriosclerotic calcifications. Impression    No acute radiographic abnormality. XR THORACIC SPINE (2 VIEWS)    Narrative    Thoracic spine x-rays, 2 views    INDICATION: Fall    COMPARISON: None. FINDINGS:  Changes of gas DISH. No fracture evident. Normal alignment is maintained.  Aortic  valve prostheses. Impression    No acute radiographic abnormality. CT CERVICAL SPINE WO CONTRAST    Narrative    CT CERVICAL SPINE    INDICATION: Fall    COMPARISON: None. TECHNIQUE: Contiguous axial images from the skull base through the superior  mediastinum were obtained with coronal and sagittal reformations. Radiation dose  reduction techniques were used for this study. Our CT scanners use one or all of  the following: Automated exposure control, adjustment of the mA and/or kV  according to patient size, iterative reconstruction. FINDINGS:  Bones: Vertebral body heights are maintained and alignment is normal.    Intervertebral discs: Multilevel degenerative changes, characterized by  intervertebral disc space narrowing, endplate osteophyte development, and facet  arthrosis. Soft tissues: No prevertebral soft tissue swelling. Impression    No acute traumatic abnormality of the cervical spine.        CBC with Auto Differential   Result Value Ref Range    WBC 9.1 4.3 - 11.1 K/uL    RBC 4.03 (L) 4.05 - 5.2 M/uL    Hemoglobin 12.9 11.7 - 15.4 g/dL    Hematocrit 36.9 35.8 - 46.3 %    MCV 91.6 82 - 102 FL    MCH 32.0 26.1 - 32.9 PG    MCHC 35.0 31.4 - 35.0 g/dL    RDW 12.3 11.9 - 14.6 %    Platelets 758 838 - 842 K/uL    MPV 10.4 9.4 - 12.3 FL    nRBC 0.00 0.0 - 0.2 K/uL    Differential Type AUTOMATED      Seg Neutrophils 85 (H) 43 - 78 %    Lymphocytes 9 (L) 13 - 44 %    Monocytes 5 4.0 - 12.0 %    Eosinophils % 1 0.5 - 7.8 %    Basophils 0 0.0 - 2.0 %    Immature Granulocytes 0 0.0 - 5.0 %    Segs Absolute 7.7 1.7 - 8.2 K/UL    Absolute Lymph # 0.8 0.5 - 4.6 K/UL    Absolute Mono # 0.5 0.1 - 1.3 K/UL    Absolute Eos # 0.1 0.0 - 0.8 K/UL    Basophils Absolute 0.0 0.0 - 0.2 K/UL    Absolute Immature Granulocyte 0.0 0.0 - 0.5 K/UL   Comprehensive Metabolic Panel   Result Value Ref Range    Sodium 139 133 - 143 mmol/L    Potassium 4.3 3.5 - 5.1 mmol/L    Chloride 104 101 - 110 mmol/L    CO2 29 21 - 32 mmol/L    Anion Gap 6 2 - 11 mmol/L    Glucose 320 (H) 65 - 100 mg/dL    BUN 10 8 - 23 MG/DL    Creatinine 0.90 0.6 - 1.0 MG/DL    Est, Glom Filt Rate >60 >60 ml/min/1.73m2    Calcium 9.6 8.3 - 10.4 MG/DL    Total Bilirubin 0.6 0.2 - 1.1 MG/DL    ALT 17 12 - 65 U/L    AST 11 (L) 15 - 37 U/L    Alk Phosphatase 141 (H) 50 - 136 U/L    Total Protein 7.1 6.3 - 8.2 g/dL    Albumin 3.4 3.2 - 4.6 g/dL    Globulin 3.7 2.8 - 4.5 g/dL    Albumin/Globulin Ratio 0.9 0.4 - 1.6     Magnesium   Result Value Ref Range    Magnesium 1.8 1.8 - 2.4 mg/dL   Troponin   Result Value Ref Range    Troponin, High Sensitivity 7.9 0 - 14 pg/mL   D-Dimer, Quantitative   Result Value Ref Range    D-Dimer, Quant 0.51 <0.56 ug/ml(FEU)   EKG 12 Lead   Result Value Ref Range    Ventricular Rate 72 BPM    Atrial Rate 72 BPM    P-R Interval 194 ms    QRS Duration 101 ms    Q-T Interval 419 ms    QTc Calculation (Bazett) 459 ms    P Axis 60 degrees    R Axis -62 degrees    T Axis 59 degrees    Diagnosis Sinus rhythm         XR CHEST PORTABLE   Final Result   No radiographic evidence of acute cardiopulmonary disease. XR SHOULDER LEFT (MIN 2 VIEWS)   Final Result   No acute radiographic abnormality of the left shoulder or humerus. XR HUMERUS LEFT (MIN 2 VIEWS)   Final Result   No acute radiographic abnormality of the left shoulder or humerus. XR WRIST LEFT (MIN 3 VIEWS)   Final Result   No acute radiographic abnormality. XR THORACIC SPINE (2 VIEWS)   Final Result   No acute radiographic abnormality. CT HEAD WO CONTRAST   Final Result   Right frontal scalp hematoma. No acute intracranial abnormality. .         CT CERVICAL SPINE WO CONTRAST   Final Result   No acute traumatic abnormality of the cervical spine. Voice dictation software was used during the making of this note. This software is not perfect and grammatical and other typographical errors may be present.   This note has not been completely proofread for errors.      Letha Astorga, DO  11/21/22 [address], DO  12/22/22 1370

## 2022-11-21 NOTE — H&P
Hospitalist History and Physical   Admit Date:  2022 11:40 AM   Name:  Terra Valdivia   Age:  81 y.o. Sex:  female  :  1940   MRN:  [id number]   Room:  [place] [address]    Presenting Complaint: Loss of Consciousness     Reason(s) for Admission: Recurrent syncope [R55]     History of Present Illness:   Terra Valdivia is a 81 y.o. 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 C/o pain in L scapular area and right side of head with hematoma. She reports recurrent episodes of syncope when eating or drinking, reports at least 6 separate occurrences. History obtained by  who notes she has had progressive worsening of memory loss, dementia, followed by her PCP. She is having increasingly more episodes of LOC. Usually occurs while eating or drinking. She'll have a blank gaze and pass out, sometimes associated with fecal or urinary incontinence. Never convulses. Sometimes she'll have visual hallucinations prior to LOC. She admits sometimes she can feel it happening like the blood is rushing from her face. She admits to h/o stroke 30 years ago with L eye blindness. Does not see a neurologist. Sister also suffers from T2DM and breast cancer. Her niece also has breast cancer and her mom. She was recently diagnosed with infiltrating ductal carcinoma of R breast within the last couple months, has follow up with oncology. No personal or family h/o seizures.  checks her glucose and BP after events and glucocse never low and blood pressure never low. Previously GLU was always ~600 now has been 200 or less. In ED, VSS stable. Labs significant for . Trop neg x2, hgb 12.9 down from 14.4 on 22. UA concentrated >1000 GLU and moderate LE   CTH w/o Right frontal scalp hematoma. contraol global parenchymal volume loss and ex vacuo ventriculomegaly, small vessel ischemic disease    C spine no acute findings, multilevel degenerative changes     CXR no acute findings     Thoracic spine XR, left wrist XR, humerus left XR and left shoulder XR with no acute findings. Review of Systems:  10 systems reviewed and negative except as noted in HPI. Assessment & Plan:     * Recurrent syncope  Assessment & Plan  Remote tele   Possibly post prandially hypotension from DM autonomic/polyneuropathy vs non convulsive seizures or adverse effects of drugs such as TCA. Check orthostatics   Fall, seizure precautions, PT/OT, PPD   Check spot EEG   Consult neurology     Chronic heart failure with preserved ejection fraction (HFpEF) ([facility]  Assessment & Plan  Last TTE with perserved LVEF, stable prosthestic AV valve. LV diastolic dysfunction. Clinically compensated. On SLGT2i @ home. Thrush, oral  Assessment & Plan  Start clotrimazole troch 5x x 10 days. FU with PCP if not improving, consider alternative diagnosis     Infiltrating ductal carcinoma of breast, right [facility])  Assessment & Plan   Strong family history. Recently diagnosed. Treatment not yet started. GERD (gastroesophageal reflux disease)  Assessment & Plan   Cont. PPI. Stable     Polyneuropathy associated with underlying disease ([facility]  Assessment & Plan  Check b12. Glycemic control. Gabapentin 600 mg BID prn for now     COPD (chronic obstructive pulmonary disease) ([facility]  Assessment & Plan   Former smoker.  still smokes. Not in acute exacerbation. Albuterol prn     Coronary artery disease involving native coronary artery of native heart  Assessment & Plan   Stable. Trop negative. Cont DAPT, statin, BB, arb     S/p TAVR (transcatheter aortic valve replacement), bioprosthetic  Assessment & Plan   Stable. F/b cardiology. Cont.  Antiplatelets, statin, BB     Type 2 diabetes mellitus with diabetic nephropathy, with long-term current use of insulin ([facility]  Assessment & Plan  Uncontrolled. Hold PO meds, start lantus qhs plus moderate SSI. Check b12 in AM on metformin. At high risk for falls  Assessment & Plan   PT/OT, PPD     Recurrent depression ([facility]  Assessment & Plan  Resume cymbalta 30 mg daily, recommend increasing to 60 mg daily and discontining elavil. Will hold elavil for now d/t side effects. Anticipated discharge needs:     Placememnt     Diet: ADULT DIET; Regular; 3 carb choices (45 gm/meal); Low Fat/Low Chol/High Fiber/TYRONE  VTE ppx: lovenox   Code status: Full Code    Hospital Problems:  Principal Problem:    Recurrent syncope  Active Problems:    Infiltrating ductal carcinoma of breast, right (HCC)    Thrush, oral    Chronic heart failure with preserved ejection fraction (HFpEF) ([facility])    Recurrent depression ([facility])    At high risk for falls    Type 2 diabetes mellitus with diabetic nephropathy, with long-term current use of insulin ([facility])    S/p TAVR (transcatheter aortic valve replacement), bioprosthetic    Primary hypertension    Coronary artery disease involving native coronary artery of native heart    COPD (chronic obstructive pulmonary disease) ([facility])    Polyneuropathy associated with underlying disease ([facility])    GERD (gastroesophageal reflux disease)  Resolved Problems:    * No resolved hospital problems.  *       Past History:     Past Medical History:   Diagnosis Date    Anatomical narrow angle borderline glaucoma 6/10/2015    Blindness of left eye     Cat bite of finger     Chronic angle-closure glaucoma 6/10/2015    COPD (chronic obstructive pulmonary disease) ([facility] 06/10/2015    managed by family     Coronary artery disease involving native coronary artery of native heart 10/11/2021    stents x 2; no CP no SOB     Cortical, lamellar, or zonular cataract, nonsenile 6/10/2015    COVID-19 vaccine series completed 05/14/2021    J&J vaccine one dose regimen    Dementia ([facility]     medications     Diabetes ([facility] Diverticulosis     Gastrointestinal disorder     diverticultits x 2 episodes    GERD (gastroesophageal reflux disease) 06/10/2015    controlled     Hyperlipemia 6/10/2015    Hypertension 06/10/2015    no meds at this time: BP is 111/50 as of 11/22/2021     Memory loss 06/10/2015    memantine     Menopause     Methicillin resistant Staphylococcus aureus in conditions classified elsewhere and of unspecified site     Nearsightedness 6/10/2015    Neuropathy 06/10/2015    gabapentin    Nuclear cataract, nonsenile 6/10/2015    Obesity     Optic atrophy, 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    Osteoarthritis     Osteoarthrosis involving, or with mention of more than one site, but not specified as generalized, site unspecified(715.80) 6/10/2015    Other and combined forms of senile cataract 6/10/2015    Other ill-defined conditions(799.89)     back pain    Pain in limb     Psychiatric disorder     depression; memory issues     Regular astigmatism 6/10/2015    Severe stage glaucoma 06/10/2015    Stroke ([facility]     over 20 years ago; blind in right eye due to stroke     Type II or unspecified type diabetes mellitus with neurological manifestations, not stated as uncontrolled(250.60) 06/10/2015    does not do fbs checks; today 12/8/20 fbs 250; Hgb A1c: 9.3 on 4/6/40    Umbilical hernia     no repair       Past Surgical History:   Procedure Laterality Date    APPENDECTOMY      BREAST BIOPSY Right 05/16/2022    u/s: breast and axilla    CARDIAC CATHETERIZATION      x 2 stents     [address] [place] (CERVIX STATUS UNKNOWN)      20s    OTHER SURGICAL HISTORY  2/2012    removed blood clot from her anus    OTHER SURGICAL HISTORY  1978    benign growth removal from vocal cords    OTHER SURGICAL HISTORY      full dentures r/t tooth extractions    OTHER SURGICAL HISTORY  2001    both eyes optic nerve    OTHER SURGICAL HISTORY  4/2012    lipoma removal    OTHER SURGICAL HISTORY  1978    exploratory POLYPECTOMY  1992    TONSILLECTOMY      US BREAST NEEDLE BIOPSY RIGHT Right 2022    US BREAST NEEDLE BIOPSY RIGHT 2022 [facility]    US LYMPH NODE BIOPSY  2022    US LYMPH NODE BIOPSY 2022 [facility]        Social History     Tobacco Use    Smoking status: Former     Packs/day: 1.00     Types: Cigarettes     Quit date: 2000     Years since quittin.8    Smokeless tobacco: Never   Substance Use Topics    Alcohol use: No      Social History     Substance and Sexual Activity   Drug Use No       Family History   Problem Relation Age of Onset    Heart Disease Maternal Grandfather     Cancer Paternal Uncle         throat cancer    Cancer Paternal Grandfather         throat cancer    Other Other         clogged arteries diverticulitis    Breast Cancer Sister 72    Breast Cancer Daughter 62    Diabetes Mother     Stroke Mother     Hypertension Mother     Diabetes Father     Heart Disease Father     Hypertension Father     Other Maternal Maribel Castles tumors, cyst in kidneys    Diabetes Maternal Uncle     Cancer Paternal Aunt         lung    COPD Paternal Aunt     Diabetes Maternal Grandfather         Immunization History   Administered Date(s) Administered    COVID-19, J&J, (age 18y+), IM, 0.5 mL 2021    Influenza Quadv 2016    Influenza Virus Vaccine 2015, 2018    Influenza, FLUAD, (age 72 y+), Adjuvanted, 0.5mL 2020    Influenza, FLUCELVAX, (age 10 mo+), MDCK, PF, 0.5mL 2022    Influenza, High Dose (Fluzone 72 yrs and older) 2018, 10/07/2021    Influenza, Triv, inactivated, subunit, adjuvanted, IM (Fluad 65 yrs and older) 2020    Influenza, Trivalent PF 10/05/2015    Pneumococcal Conjugate 13-valent ([id number]) 10/23/2014    Pneumococcal Polysaccharide ([id number]) 2017    Tdap (Boostrix, Adacel) 05/15/2018     Allergies   Allergen Reactions    Meperidine Nausea And Vomiting     Stomach upset    Morphine Nausea And Vomiting     Stomach upset     Prior to Admit Medications:  Current Outpatient Medications   Medication Instructions    albuterol sulfate HFA (PROVENTIL;VENTOLIN;PROAIR) 108 (90 Base) MCG/ACT inhaler 1 puff, Inhalation, 3 TIMES DAILY PRN    amitriptyline (ELAVIL) 25 MG tablet TAKE 1 TABLET BY MOUTH NIGHTLY.  FOR SLEEP AND DEPRESSION    aspirin 81 mg, Oral, DAILY    atorvastatin (LIPITOR) 40 mg, Oral, DAILY    clopidogrel (PLAVIX) 75 mg, Oral, DAILY    DULoxetine (CYMBALTA) 30 mg, Oral, DAILY    Empagliflozin-linaGLIPtin (GLYXAMBI) 25-5 MG TABS 1 per day for diabetes    gabapentin (NEURONTIN) 600 MG tablet 1 tablet by mouth twice daily    hydrocortisone acetate (PROCTOCORT) 10 % external foam 1 applicator, Rectal, EVERY OTHER DAY    hydrOXYzine (ATARAX) 25 MG tablet 1-2 every 8-12hours prn anxiety/sleep    insulin detemir (LEVEMIR FLEXTOUCH) 100 UNIT/ML injection pen 20 units daily am    Lancets MISC To be used with dispensed glucometer to test 5 times daily    losartan (COZAAR) 50 mg, Oral, DAILY    metFORMIN (GLUCOPHAGE) 500 MG tablet 1 twice a day, recommended irrespective of any other treatment for diabetes as first-line with GFR over 30    metoprolol succinate (TOPROL XL) 50 mg, Oral, DAILY    Multiple Vitamins-Minerals (MULTIVITAMIN WITH MINERALS) tablet Once daily OTC    omeprazole (PRILOSEC) 20 mg, Oral, DAILY, For heartburn/refluxTAKE 1 CAPSULE BY MOUTH EVERY DAY    polyethylene glycol (GLYCOLAX) 17 g, Oral, 2 TIMES DAILY         Objective:   Patient Vitals for the past 24 hrs:   Temp Pulse Resp BP SpO2   11/21/22 1430 -- 68 17 (!) 141/65 99 %   11/21/22 1415 -- 65 13 (!) 149/137 97 %   11/21/22 1400 -- 64 (!) 9 (!) 137/53 99 %   11/21/22 1345 -- 64 16 131/65 98 %   11/21/22 1330 -- 59 16 (!) 157/101 98 %   11/21/22 1315 -- 76 18 (!) 136/118 97 %   11/21/22 1210 97.9 °F (36.6 °C) 73 16 (!) 142/63 98 %   11/21/22 1200 -- 71 (!) 5 (!) 142/63 97 %   11/21/22 1145 -- 71 12 (!) 148/65 99 %   11/21/22 1135 97.9 °F (36.6 °C) 72 16 -- 99 %       Oxygen Therapy  SpO2: 99 %  Pulse via Oximetry: 69 beats per minute  O2 Device: None (Room air)    Estimated body mass index is 29.29 kg/m² as calculated from the following:    Height as of this encounter: 5' (1.524 m). Weight as of this encounter: 150 lb (68 kg). No intake or output data in the 24 hours ending 11/21/22 1540      Physical Exam:    Blood pressure (!) 141/65, pulse 68, temperature 97.9 °F (36.6 °C), temperature source Oral, resp. rate 17, height 5' (1.524 m), weight 150 lb (68 kg), SpO2 99 %. Physical Exam  Vitals and nursing note reviewed. Constitutional:       Appearance: She is obese. Comments: Chronically ill appearing    HENT:      Head: Normocephalic. Comments: Right frontal hematoma      Nose: Nose normal.      Mouth/Throat:      Mouth: Mucous membranes are moist.      Comments: Tommy Ellsworth noted on lateral right tongue   Eyes:      Extraocular Movements: Extraocular movements intact. Conjunctiva/sclera: Conjunctivae normal.      Pupils: Pupils are equal, round, and reactive to light. Comments: Mild nystagmus    Cardiovascular:      Rate and Rhythm: Normal rate and regular rhythm. Heart sounds: No murmur heard. Pulmonary:      Effort: Pulmonary effort is normal.      Breath sounds: Normal breath sounds. No wheezing, rhonchi or rales. Abdominal:      General: Abdomen is flat. Bowel sounds are normal.      Palpations: Abdomen is soft. Musculoskeletal:      Cervical back: Neck supple. Right lower leg: No edema. Left lower leg: No edema. Skin:     General: Skin is warm. Capillary Refill: Capillary refill takes less than 2 seconds. Coloration: Skin is pale. Neurological:      General: No focal deficit present. Mental Status: She is alert and oriented to person, place, and time. Mental status is at baseline.    Psychiatric:         Mood and Affect: Mood normal.         Behavior: Behavior normal.       I have personally reviewed labs and tests showing:  Recent Labs:  Recent Results (from the past 24 hour(s))   EKG 12 Lead    Collection Time: 11/21/22 11:38 AM   Result Value Ref Range    Ventricular Rate 72 BPM    Atrial Rate 72 BPM    P-R Interval 194 ms    QRS Duration 101 ms    Q-T Interval 419 ms    QTc Calculation (Bazett) 459 ms    P Axis 60 degrees    R Axis -62 degrees    T Axis 59 degrees    Diagnosis Sinus rhythm    CBC with Auto Differential    Collection Time: 11/21/22 11:43 AM   Result Value Ref Range    WBC 9.1 4.3 - 11.1 K/uL    RBC 4.03 (L) 4.05 - 5.2 M/uL    Hemoglobin 12.9 11.7 - 15.4 g/dL    Hematocrit 36.9 35.8 - 46.3 %    MCV 91.6 82 - 102 FL    MCH 32.0 26.1 - 32.9 PG    MCHC 35.0 31.4 - 35.0 g/dL    RDW 12.3 11.9 - 14.6 %    Platelets 915 107 - 918 K/uL    MPV 10.4 9.4 - 12.3 FL    nRBC 0.00 0.0 - 0.2 K/uL    Differential Type AUTOMATED      Seg Neutrophils 85 (H) 43 - 78 %    Lymphocytes 9 (L) 13 - 44 %    Monocytes 5 4.0 - 12.0 %    Eosinophils % 1 0.5 - 7.8 %    Basophils 0 0.0 - 2.0 %    Immature Granulocytes 0 0.0 - 5.0 %    Segs Absolute 7.7 1.7 - 8.2 K/UL    Absolute Lymph # 0.8 0.5 - 4.6 K/UL    Absolute Mono # 0.5 0.1 - 1.3 K/UL    Absolute Eos # 0.1 0.0 - 0.8 K/UL    Basophils Absolute 0.0 0.0 - 0.2 K/UL    Absolute Immature Granulocyte 0.0 0.0 - 0.5 K/UL   Comprehensive Metabolic Panel    Collection Time: 11/21/22 11:43 AM   Result Value Ref Range    Sodium 139 133 - 143 mmol/L    Potassium 4.3 3.5 - 5.1 mmol/L    Chloride 104 101 - 110 mmol/L    CO2 29 21 - 32 mmol/L    Anion Gap 6 2 - 11 mmol/L    Glucose 320 (H) 65 - 100 mg/dL    BUN 10 8 - 23 MG/DL    Creatinine 0.90 0.6 - 1.0 MG/DL    Est, Glom Filt Rate >60 >60 ml/min/1.73m2    Calcium 9.6 8.3 - 10.4 MG/DL    Total Bilirubin 0.6 0.2 - 1.1 MG/DL    ALT 17 12 - 65 U/L    AST 11 (L) 15 - 37 U/L    Alk Phosphatase 141 (H) 50 - 136 U/L    Total Protein 7.1 6.3 - 8.2 g/dL    Albumin 3.4 3.2 - 4.6 g/dL    Globulin 3.7 2.8 - 4.5 g/dL Albumin/Globulin Ratio 0.9 0.4 - 1.6     Magnesium    Collection Time: 11/21/22 11:43 AM   Result Value Ref Range    Magnesium 1.8 1.8 - 2.4 mg/dL   Troponin    Collection Time: 11/21/22 11:43 AM   Result Value Ref Range    Troponin, High Sensitivity 7.9 0 - 14 pg/mL   D-Dimer, Quantitative    Collection Time: 11/21/22 11:43 AM   Result Value Ref Range    D-Dimer, Quant 0.51 <0.56 ug/ml(FEU)   Urinalysis w rflx microscopic    Collection Time: 11/21/22  2:29 PM   Result Value Ref Range    Color, UA YELLOW/STRAW      Appearance CLOUDY      Specific Gravity, UA 1.025 (H) 1.001 - 1.023      pH, Urine 6.5 5.0 - 9.0      Protein, UA Negative NEG mg/dL    Glucose, UA >1000 mg/dL    Ketones, Urine Negative NEG mg/dL    Bilirubin Urine Negative NEG      Blood, Urine Negative NEG      Urobilinogen, Urine 1.0 0.2 - 1.0 EU/dL    Nitrite, Urine Negative NEG      Leukocyte Esterase, Urine MODERATE (A) NEG      RBC, UA 10-20 0 /hpf    BACTERIA, URINE 0 0 /hpf    Other observations RESULTS VERIFIED MANUALLY         I have personally reviewed imaging studies showing:  XR THORACIC SPINE (2 VIEWS)    Result Date: 11/21/2022  Thoracic spine x-rays, 2 views INDICATION: Fall COMPARISON: None. FINDINGS: Changes of gas DISH. No fracture evident. Normal alignment is maintained. Aortic valve prostheses. No acute radiographic abnormality. XR HUMERUS LEFT (MIN 2 VIEWS)    Result Date: 11/21/2022  Left shoulder radiographs, 3 views Left humerus x-rays, 2 views INDICATION: Fall COMPARISON: None available. FINDINGS: No acute fracture or dislocation. Normal alignment is maintained. Moderate glenohumeral and acromioclavicular osteoarthritic changes. The soft tissues have a normal radiographic appearance. No acute radiographic abnormality of the left shoulder or humerus. XR WRIST LEFT (MIN 3 VIEWS)    Result Date: 11/21/2022  Left wrist radiographs, 3 views INDICATION: Fall COMPARISON: None.  FINDINGS: No acute fracture or dislocation. Normal alignment is maintained. Arteriosclerotic calcifications. No acute radiographic abnormality. CT HEAD WO CONTRAST    Result Date: 11/21/2022  EXAM: CT HEAD WITHOUT CONTRAST INDICATION: fall. COMPARISON: 9/1/2019 TECHNIQUE: Contiguous axial images were obtained from the skull base through the vertex without IV contrast. Radiation dose reduction techniques were used for this study. Our CT scanners use one or all of the following: Automated exposure control, adjustment of the mA and/or kV according to patient size, iterative reconstruction. FINDINGS: Global parenchymal volume loss and ex vacuo ventriculomegaly. Periventricular white matter hypoattenuation reflects sequelae of small vessel ischemic disease. No hydrocephalus or midline shift. No extra-axial mass or hemorrhage. The basal cisterns are patent. The visualized portions of the orbits are normal. The mastoid air cells and paranasal sinuses are patent. The visualized vascular structures have an unremarkable noncontrast appearance. Right frontal scalp hematoma. Underlying calvarium is intact. Incidental hyperostosis frontalis externa. Right frontal scalp hematoma. No acute intracranial abnormality. .     CT CERVICAL SPINE WO CONTRAST    Result Date: 11/21/2022  CT CERVICAL SPINE INDICATION: Fall COMPARISON: None. TECHNIQUE: Contiguous axial images from the skull base through the superior mediastinum were obtained with coronal and sagittal reformations. Radiation dose reduction techniques were used for this study. Our CT scanners use one or all of the following: Automated exposure control, adjustment of the mA and/or kV according to patient size, iterative reconstruction. FINDINGS: Bones: Vertebral body heights are maintained and alignment is normal. Intervertebral discs: Multilevel degenerative changes, characterized by intervertebral disc space narrowing, endplate osteophyte development, and facet arthrosis.  Soft tissues: No prevertebral soft tissue swelling. No acute traumatic abnormality of the cervical spine. XR SHOULDER LEFT (MIN 2 VIEWS)    Result Date: 11/21/2022  Left shoulder radiographs, 3 views Left humerus x-rays, 2 views INDICATION: Fall COMPARISON: None available. FINDINGS: No acute fracture or dislocation. Normal alignment is maintained. Moderate glenohumeral and acromioclavicular osteoarthritic changes. The soft tissues have a normal radiographic appearance. No acute radiographic abnormality of the left shoulder or humerus. XR CHEST PORTABLE    Result Date: 11/21/2022  CHEST X-RAY, 1 VIEW INDICATION: Syncopal COMPARISON: None available TECHNIQUE: Single AP view of the chest was obtained. FINDINGS: Lungs and pleural spaces: Normal. No pneumothorax or pleural effusion. Cardiomediastinal silhouette: Normal. Osseous structures: Normal.     No radiographic evidence of acute cardiopulmonary disease. Echocardiogram:  09/13/21    TRANSTHORACIC ECHOCARDIOGRAM (TTE) COMPLETE (CONTRAST/BUBBLE/3D PRN) 09/14/2021  7:28 AM, 09/14/2021 12:00 AM (Final)    Narrative  This is a summary report. The complete report is available in the patient's medical record. If you cannot access the medical record, please contact the sending organization for a detailed fax or copy. · AV: Aortic valve leaflet calcification present with reduced excursion. 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 Aortic valve mean gradient is 60 mmHg. Aortic valve area is 0.5 cm2. Critical aortic valve stenosis is present. Mild aortic valve regurgitation is present. · LV: Estimated LVEF is 55 - 60%. Normal systolic function (ejection fraction normal). Small left ventricle. Severe concentric hypertrophy. Wall motion: normal. Left ventricular diastolic dysfunction. · MV: Mild mitral annular calcification. Mild mitral valve regurgitation is present. CRITICAL AORTIC VALVE STENOSIS.   RECOMMEND CONSIDERATION OF URGENT IN OFFICE CONSULTATION WITH A TAVR PROVIDER. REPORT FORWARDED TO PRIMARY CARE AS WELL AS TAVR TEAM    Signed by:  Celestino Velasquez MD on 9/14/2021  7:28 AM, Signed by: Unknown Provider Result on 9/14/2021 12:00 AM        Orders Placed This Encounter   Medications    magnesium sulfate 2000 mg in 50 mL IVPB premix    sodium chloride flush 0.9 % injection 5-40 mL    sodium chloride flush 0.9 % injection 5-40 mL    0.9 % sodium chloride infusion    enoxaparin (LOVENOX) injection 40 mg     Order Specific Question:   Indication of Use     Answer:   Prophylaxis-DVT/PE    OR Linked Order Group     ondansetron (ZOFRAN-ODT) disintegrating tablet 4 mg     ondansetron (ZOFRAN) injection 4 mg    polyethylene glycol (GLYCOLAX) packet 17 g    OR Linked Order Group     acetaminophen (TYLENOL) tablet 650 mg     acetaminophen (TYLENOL) suppository 650 mg    tuberculin injection 5 Units    OR Linked Order Group     potassium chloride (KLOR-CON M) extended release tablet 40 mEq     potassium bicarb-citric acid (EFFER-K) effervescent tablet 40 mEq     potassium chloride 10 mEq/100 mL IVPB (Peripheral Line)    magnesium sulfate 2000 mg in 50 mL IVPB premix    aluminum & magnesium hydroxide-simethicone (MAALOX) 200-200-20 MG/5ML suspension 30 mL    glucose chewable tablet 16 g    OR Linked Order Group     dextrose bolus 10% 125 mL     dextrose bolus 10% 250 mL    glucagon (rDNA) injection 1 mg    dextrose 10 % infusion    insulin glargine (LANTUS) injection vial 17 Units    insulin lispro (HUMALOG) injection vial 0-8 Units    insulin lispro (HUMALOG) injection vial 0-4 Units    atorvastatin (LIPITOR) tablet 40 mg    clotrimazole (MYCELEX) maryann 10 mg    clopidogrel (PLAVIX) tablet 75 mg    pantoprazole (PROTONIX) tablet 40 mg    albuterol (PROVENTIL) nebulizer solution 2.5 mg     Order Specific Question:   Initiate RT Bronchodilator Protocol     Answer:   No    gabapentin (NEURONTIN) capsule 600 mg    DULoxetine (CYMBALTA) extended release capsule 30 mg    losartan (COZAAR) tablet 50 mg    metoprolol succinate (TOPROL XL) extended release tablet 50 mg         Signed:  Sabiha Serrano DO, DO    Part of this note may have been written by using a voice dictation software. The note has been proof read but may still contain some grammatical/other typographical errors.

## 2022-11-21 NOTE — ASSESSMENT & PLAN NOTE
Last TTE with perserved LVEF, stable prosthestic AV valve. LV diastolic dysfunction. Clinically compensated. On SLGT2i @ home.

## 2022-11-21 NOTE — ASSESSMENT & PLAN NOTE
Resume cymbalta 30 mg daily, recommend increasing to 60 mg daily and discontining elavil. Will hold elavil for now d/t side effects.

## 2022-11-21 NOTE — ASSESSMENT & PLAN NOTE
Start clotrimazole troch 5x x 10 days.  FU with PCP if not improving, consider alternative diagnosis

## 2022-11-21 NOTE — ED NOTES
TRANSFER - OUT REPORT:    Verbal report given to 5th floor RN on Chapis Humphries  being transferred to 5th floor for routine progression of patient care       Report consisted of patient's Situation, Background, Assessment and   Recommendations(SBAR). Information from the following report(s) Nurse Handoff Report was reviewed with the receiving nurse. [place] Assessment: Presents to emergency department  because of falls (Syncope, seizure, or loss of consciousness): Yes, Age > 79: Yes, Altered Mental Status, Intoxication with alcohol or substance confusion (Disorientation, impaired judgment, poor safety awaremess, or inability to follow instructions): No, Impaired Mobility: Ambulates or transfers with assistive devices or assistance; Unable to ambulate or transer.: No, Nursing Judgement: Yes  Lines:   Peripheral IV 11/21/22 Right Antecubital (Active)        Opportunity for questions and clarification was provided.       Patient transported with:  Kulwant Nunez RN  11/21/22 6327

## 2022-11-22 LAB
ANION GAP SERPL CALC-SCNC: 7 MMOL/L (ref 2–11)
BUN SERPL-MCNC: 10 MG/DL (ref 8–23)
CALCIUM SERPL-MCNC: 9.3 MG/DL (ref 8.3–10.4)
CHLORIDE SERPL-SCNC: 104 MMOL/L (ref 101–110)
CO2 SERPL-SCNC: 27 MMOL/L (ref 21–32)
CREAT SERPL-MCNC: 0.7 MG/DL (ref 0.6–1)
ERYTHROCYTE [DISTWIDTH] IN BLOOD BY AUTOMATED COUNT: 12 % (ref 11.9–14.6)
EST. AVERAGE GLUCOSE BLD GHB EST-MCNC: 209 MG/DL
GLUCOSE BLD STRIP.AUTO-MCNC: 166 MG/DL (ref 65–100)
GLUCOSE BLD STRIP.AUTO-MCNC: 201 MG/DL (ref 65–100)
GLUCOSE BLD STRIP.AUTO-MCNC: 274 MG/DL (ref 65–100)
GLUCOSE BLD STRIP.AUTO-MCNC: 306 MG/DL (ref 65–100)
GLUCOSE SERPL-MCNC: 294 MG/DL (ref 65–100)
HBA1C MFR BLD: 8.9 % (ref 4.8–5.6)
HCT VFR BLD AUTO: 34.7 % (ref 35.8–46.3)
HGB BLD-MCNC: 12.4 G/DL (ref 11.7–15.4)
MAGNESIUM SERPL-MCNC: 2.2 MG/DL (ref 1.8–2.4)
MCH RBC QN AUTO: 32.4 PG (ref 26.1–32.9)
MCHC RBC AUTO-ENTMCNC: 35.7 G/DL (ref 31.4–35)
MCV RBC AUTO: 90.6 FL (ref 82–102)
NRBC # BLD: 0 K/UL (ref 0–0.2)
PLATELET # BLD AUTO: 135 K/UL (ref 150–450)
PMV BLD AUTO: 10.2 FL (ref 9.4–12.3)
POTASSIUM SERPL-SCNC: 4 MMOL/L (ref 3.5–5.1)
RBC # BLD AUTO: 3.83 M/UL (ref 4.05–5.2)
SERVICE CMNT-IMP: ABNORMAL
SODIUM SERPL-SCNC: 138 MMOL/L (ref 133–143)
TSH W FREE THYROID IF ABNORMAL: 1.38 UIU/ML (ref 0.36–3.74)
VIT B12 SERPL-MCNC: 201 PG/ML (ref 193–986)
WBC # BLD AUTO: 7.2 K/UL (ref 4.3–11.1)

## 2022-11-22 PROCEDURE — 2580000003 HC RX 258: Performed by: FAMILY MEDICINE

## 2022-11-22 PROCEDURE — 82962 GLUCOSE BLOOD TEST: CPT

## 2022-11-22 PROCEDURE — G0378 HOSPITAL OBSERVATION PER HR: HCPCS

## 2022-11-22 PROCEDURE — 97162 PT EVAL MOD COMPLEX 30 MIN: CPT

## 2022-11-22 PROCEDURE — 6370000000 HC RX 637 (ALT 250 FOR IP): Performed by: FAMILY MEDICINE

## 2022-11-22 PROCEDURE — 97535 SELF CARE MNGMENT TRAINING: CPT

## 2022-11-22 PROCEDURE — 83036 HEMOGLOBIN GLYCOSYLATED A1C: CPT

## 2022-11-22 PROCEDURE — 6370000000 HC RX 637 (ALT 250 FOR IP): Performed by: NURSE PRACTITIONER

## 2022-11-22 PROCEDURE — 84443 ASSAY THYROID STIM HORMONE: CPT

## 2022-11-22 PROCEDURE — 95819 EEG AWAKE AND ASLEEP: CPT

## 2022-11-22 PROCEDURE — 6360000002 HC RX W HCPCS: Performed by: FAMILY MEDICINE

## 2022-11-22 PROCEDURE — 36415 COLL VENOUS BLD VENIPUNCTURE: CPT

## 2022-11-22 PROCEDURE — 99218 PR INITIAL OBSERVATION CARE/DAY 30 MINUTES: CPT | Performed by: PSYCHIATRY & NEUROLOGY

## 2022-11-22 PROCEDURE — 83735 ASSAY OF MAGNESIUM: CPT

## 2022-11-22 PROCEDURE — 85027 COMPLETE CBC AUTOMATED: CPT

## 2022-11-22 PROCEDURE — 97530 THERAPEUTIC ACTIVITIES: CPT

## 2022-11-22 PROCEDURE — 82607 VITAMIN B-12: CPT

## 2022-11-22 PROCEDURE — 97166 OT EVAL MOD COMPLEX 45 MIN: CPT

## 2022-11-22 PROCEDURE — 95816 EEG AWAKE AND DROWSY: CPT | Performed by: PSYCHIATRY & NEUROLOGY

## 2022-11-22 PROCEDURE — 6360000002 HC RX W HCPCS: Performed by: PSYCHIATRY & NEUROLOGY

## 2022-11-22 PROCEDURE — 80048 BASIC METABOLIC PNL TOTAL CA: CPT

## 2022-11-22 PROCEDURE — 96372 THER/PROPH/DIAG INJ SC/IM: CPT

## 2022-11-22 RX ORDER — INSULIN GLARGINE 100 [IU]/ML
20 INJECTION, SOLUTION SUBCUTANEOUS NIGHTLY
Status: DISCONTINUED | OUTPATIENT
Start: 2022-11-22 | End: 2022-11-23 | Stop reason: HOSPADM

## 2022-11-22 RX ORDER — CLOTRIMAZOLE 10 MG/1
10 LOZENGE ORAL; TOPICAL 3 TIMES DAILY
Status: DISCONTINUED | OUTPATIENT
Start: 2022-11-22 | End: 2022-11-23 | Stop reason: HOSPADM

## 2022-11-22 RX ORDER — CLOTRIMAZOLE 10 MG/1
10 LOZENGE ORAL; TOPICAL
Status: DISCONTINUED | OUTPATIENT
Start: 2022-11-22 | End: 2022-11-22

## 2022-11-22 RX ORDER — CYANOCOBALAMIN 1000 UG/ML
1000 INJECTION, SOLUTION INTRAMUSCULAR; SUBCUTANEOUS DAILY
Status: DISCONTINUED | OUTPATIENT
Start: 2022-11-22 | End: 2022-11-23 | Stop reason: HOSPADM

## 2022-11-22 RX ORDER — ASPIRIN 81 MG/1
81 TABLET ORAL DAILY
Status: DISCONTINUED | OUTPATIENT
Start: 2022-11-22 | End: 2022-11-23 | Stop reason: HOSPADM

## 2022-11-22 RX ADMIN — ENOXAPARIN SODIUM 40 MG: 100 INJECTION SUBCUTANEOUS at 15:58

## 2022-11-22 RX ADMIN — CLOTRIMAZOLE 10 MG: 10 LOZENGE ORAL; TOPICAL at 12:05

## 2022-11-22 RX ADMIN — LOSARTAN POTASSIUM 50 MG: 50 TABLET, FILM COATED ORAL at 08:29

## 2022-11-22 RX ADMIN — CLOPIDOGREL BISULFATE 75 MG: 75 TABLET ORAL at 08:29

## 2022-11-22 RX ADMIN — INSULIN LISPRO 4 UNITS: 100 INJECTION, SOLUTION INTRAVENOUS; SUBCUTANEOUS at 12:06

## 2022-11-22 RX ADMIN — INSULIN GLARGINE 20 UNITS: 100 INJECTION, SOLUTION SUBCUTANEOUS at 20:59

## 2022-11-22 RX ADMIN — PANTOPRAZOLE SODIUM 40 MG: 40 TABLET, DELAYED RELEASE ORAL at 05:53

## 2022-11-22 RX ADMIN — ASPIRIN 81 MG: 81 TABLET ORAL at 12:05

## 2022-11-22 RX ADMIN — INSULIN LISPRO 6 UNITS: 100 INJECTION, SOLUTION INTRAVENOUS; SUBCUTANEOUS at 08:29

## 2022-11-22 RX ADMIN — SODIUM CHLORIDE, PRESERVATIVE FREE 10 ML: 5 INJECTION INTRAVENOUS at 09:27

## 2022-11-22 RX ADMIN — METOPROLOL SUCCINATE 50 MG: 50 TABLET, EXTENDED RELEASE ORAL at 08:29

## 2022-11-22 RX ADMIN — SODIUM CHLORIDE, PRESERVATIVE FREE 10 ML: 5 INJECTION INTRAVENOUS at 21:00

## 2022-11-22 RX ADMIN — CYANOCOBALAMIN 1000 MCG: 1000 INJECTION, SOLUTION INTRAMUSCULAR; SUBCUTANEOUS at 12:06

## 2022-11-22 RX ADMIN — CLOTRIMAZOLE 10 MG: 10 LOZENGE ORAL; TOPICAL at 15:58

## 2022-11-22 RX ADMIN — ATORVASTATIN CALCIUM 40 MG: 40 TABLET, FILM COATED ORAL at 08:29

## 2022-11-22 RX ADMIN — CLOTRIMAZOLE 10 MG: 10 LOZENGE ORAL; TOPICAL at 06:00

## 2022-11-22 RX ADMIN — DULOXETINE HYDROCHLORIDE 30 MG: 30 CAPSULE, DELAYED RELEASE ORAL at 08:29

## 2022-11-22 RX ADMIN — CLOTRIMAZOLE 10 MG: 10 LOZENGE ORAL; TOPICAL at 21:00

## 2022-11-22 ASSESSMENT — PAIN SCALES - GENERAL
PAINLEVEL_OUTOF10: 0
PAINLEVEL_OUTOF10: 0

## 2022-11-22 NOTE — CONSULTS
Consult    Patient: Andrei Green MRN: [id number]     YOB: 1940  Age: 81 y.o. Sex: female      Subjective:      Andrei Green is a 81 y.o. female who is being seen for syncope. The patient has a history of dementia. The she presents to the hospital after a syncopal episode. She has had multiple episodes of syncope. Her episodes of syncope often occur when she is eating or drinking. These events have been associated with fecal and urinary incontinence. He does not have convulsions with these episodes. Onset is brief. She feels them coming on. She feels lightheaded.     Past Medical History:   Diagnosis Date    Anatomical narrow angle borderline glaucoma 6/10/2015    Blindness of left eye     Cat bite of finger     Chronic angle-closure glaucoma 6/10/2015    COPD (chronic obstructive pulmonary disease) ([facility] [address] 06/10/2015    managed by family dr    Coronary artery disease involving native coronary artery of native heart 10/11/2021    stents x 2; no CP no SOB     Cortical, lamellar, or zonular cataract, nonsenile 6/10/2015    COVID-19 vaccine series completed 05/14/2021    J&J vaccine one dose regimen    Dementia ([facility] [address]     medications     Diabetes ([facility] [address]     Diverticulosis     Gastrointestinal disorder     diverticultits x 2 episodes    GERD (gastroesophageal reflux disease) 06/10/2015    controlled     Hyperlipemia 6/10/2015    Hypertension 06/10/2015    no meds at this time: BP is 111/50 as of 11/22/2021     Memory loss 06/10/2015    memantine     Menopause     Methicillin resistant Staphylococcus aureus in conditions classified elsewhere and of unspecified site     Nearsightedness 6/10/2015    Neuropathy 06/10/2015    gabapentin    Nuclear cataract, nonsenile 6/10/2015    Obesity     Optic atrophy, 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    Osteoarthritis     Osteoarthrosis involving, or with mention of more than one site, but not specified as generalized, site unspecified(715.80) 6/10/2015    Other and combined forms of senile cataract 6/10/2015    Other ill-defined conditions(799.89)     back pain    Pain in limb     Psychiatric disorder     depression; memory issues     Regular astigmatism 6/10/2015    Severe stage glaucoma 06/10/2015    Stroke ([facility] [address]     over 20 years ago; blind in right eye due to stroke     Type II or unspecified type diabetes mellitus with neurological manifestations, not stated as uncontrolled(250.60) 06/10/2015    does not do fbs checks; today 12/8/20 fbs 250; Hgb A1c: 9.3 on 2/5/29    Umbilical hernia     no repair     Past Surgical History:   Procedure Laterality Date    APPENDECTOMY      BREAST BIOPSY Right 05/16/2022    u/s: breast and axilla    CARDIAC CATHETERIZATION      x 2 stents     CHOLECYSTECTOMY  2000    HEMORRHOID SURGERY  1982    HYSTERECTOMY (CERVIX STATUS UNKNOWN)      20s    OTHER SURGICAL HISTORY  2/2012    removed blood clot from her anus    OTHER SURGICAL HISTORY  1978    benign growth removal from vocal cords    OTHER SURGICAL HISTORY      full dentures r/t tooth extractions    OTHER SURGICAL HISTORY  2001    both eyes optic nerve    OTHER SURGICAL HISTORY  4/2012    lipoma removal    OTHER SURGICAL HISTORY  1978    exploratory    POLYPECTOMY  1992    TONSILLECTOMY      US BREAST NEEDLE BIOPSY RIGHT Right 5/16/2022    US BREAST NEEDLE BIOPSY RIGHT 5/16/2022 [facility]    US LYMPH NODE BIOPSY  5/16/2022    US LYMPH NODE BIOPSY 5/16/2022 [facility]      Family History   Problem Relation Age of Onset    Heart Disease Maternal Grandfather     Cancer Paternal Uncle         throat cancer    Cancer Paternal Grandfather         throat cancer    Other Other         clogged arteries diverticulitis    Breast Cancer Sister 72    Breast Cancer Daughter 62    Diabetes Mother     Stroke Mother     Hypertension Mother     Diabetes Father     Heart Disease Father     Hypertension Father     Other Maternal Jose Nigh tumors, cyst in kidneys    Diabetes Maternal Uncle     Cancer Paternal Aunt         lung    COPD Paternal Aunt     Diabetes Maternal Grandfather      Social History     Tobacco Use    Smoking status: Former     Packs/day: 1.00     Types: Cigarettes     Quit date: 2000     Years since quittin.8    Smokeless tobacco: Never   Substance Use Topics    Alcohol use: No      Current Facility-Administered Medications   Medication Dose Route Frequency Provider Last Rate Last Admin    cyanocobalamin injection 1,000 mcg  1,000 mcg IntraMUSCular Daily Althia Jonathan, DO        sodium chloride flush 0.9 % injection 5-40 mL  5-40 mL IntraVENous 2 times per day Ryan Bathe, DO   10 mL at 22 0927    sodium chloride flush 0.9 % injection 5-40 mL  5-40 mL IntraVENous PRN Ryan Bathe, DO        0.9 % sodium chloride infusion   IntraVENous PRN Ryan Bathe, DO        enoxaparin (LOVENOX) injection 40 mg  40 mg SubCUTAneous Daily Ryan Bathe, DO   40 mg at 22 1831    ondansetron (ZOFRAN-ODT) disintegrating tablet 4 mg  4 mg Oral Q8H PRN Ryan Bathe, DO        Or    ondansetron (ZOFRAN) injection 4 mg  4 mg IntraVENous Q6H PRN Ryan Bathe, DO        polyethylene glycol (GLYCOLAX) packet 17 g  17 g Oral Daily PRN Ryan Bathe, DO        acetaminophen (TYLENOL) tablet 650 mg  650 mg Oral Q6H PRN Ryan Bathe, DO        Or    acetaminophen (TYLENOL) suppository 650 mg  650 mg Rectal Q6H PRN Ryan Bathe, DO        tuberculin injection 5 Units  5 Units IntraDERmal Once Ryan Bathe, DO        potassium chloride (KLOR-CON M) extended release tablet 40 mEq  40 mEq Oral PRN Ryan Bathe, DO        Or    potassium bicarb-citric acid (EFFER-K) effervescent tablet 40 mEq  40 mEq Oral PRN Ryan Bathe, DO        Or    potassium chloride 10 mEq/100 mL IVPB (Peripheral Line)  10 mEq IntraVENous PRN Ryan Bathe, DO        magnesium sulfate 2000 mg in 50 mL IVPB premix  2,000 mg IntraVENous PRN Ryan Bathe, DO        aluminum & magnesium hydroxide-simethicone (MAALOX) 489-778-24 MG/5ML suspension 30 mL  30 mL Oral Q6H PRN [facility]        glucose chewable tablet 16 g  4 tablet Oral PRN [facility], [facility]        dextrose bolus 10% 125 mL  125 mL IntraVENous PRN [facility], DO        Or    dextrose bolus 10% 250 mL  250 mL IntraVENous PRN [facility], DO        glucagon (rDNA) injection 1 mg  1 mg SubCUTAneous PRN [facility], DO        dextrose 10 % infusion   IntraVENous Continuous PRN [facility], DO        insulin glargine (LANTUS) injection vial 17 Units  0.25 Units/kg SubCUTAneous Nightly [facility], DO   17 Units at 11/21/22 2127    insulin lispro (HUMALOG) injection vial 0-8 Units  0-8 Units SubCUTAneous TID WC [facility], DO   6 Units at 11/22/22 0829    insulin lispro (HUMALOG) injection vial 0-4 Units  0-4 Units SubCUTAneous Nightly [facility], DO   4 Units at 11/21/22 2128    atorvastatin (LIPITOR) tablet 40 mg  40 mg Oral Daily [facility], DO   40 mg at 11/22/22 6601    clotrimazole (MYCELEX) maryann 10 mg  10 mg Oral 5x Daily [facility], DO   10 mg at 11/22/22 0600    clopidogrel (PLAVIX) tablet 75 mg  75 mg Oral Daily [facility], DO   75 mg at 11/22/22 0829    pantoprazole (PROTONIX) tablet 40 mg  40 mg Oral QAM AC [facility], DO   40 mg at 11/22/22 0553    albuterol (PROVENTIL) nebulizer solution 2.5 mg  2.5 mg Nebulization Q6H PRN [facility], DO        gabapentin (NEURONTIN) capsule 600 mg  600 mg Oral BID PRN [facility], DO        DULoxetine (CYMBALTA) extended release capsule 30 mg  30 mg Oral Daily [facility], DO   30 mg at 11/22/22 0829    losartan (COZAAR) tablet 50 mg  50 mg Oral Daily [facility], DO   50 mg at 11/22/22 5616    metoprolol succinate (TOPROL XL) extended release tablet 50 mg  50 mg Oral Daily [facility], DO   50 mg at 11/22/22 9406        Allergies   Allergen Reactions    Meperidine Nausea And Vomiting     Stomach upset    Morphine Nausea And Vomiting     Stomach upset       Review of Systems:  CONSTITUTIONAL: No weight loss, fever, chills, weakness or fatigue. HEENT: Eyes: No visual loss, blurred vision, double vision or yellow sclerae. Ears, Nose, Throat: No hearing loss, sneezing, congestion, runny nose or sore throat. SKIN: No rash or itching. CARDIOVASCULAR: No chest pain, chest pressure or chest discomfort. No palpitations or edema. RESPIRATORY: No shortness of breath, cough or sputum. GASTROINTESTINAL: No anorexia, nausea, vomiting or diarrhea. No abdominal pain or blood. GENITOURINARY: no burning with urination. NEUROLOGICAL: As above   MUSCULOSKELETAL: No muscle, back pain, joint pain or stiffness. HEMATOLOGIC: No anemia, bleeding or bruising. LYMPHATICS: No enlarged nodes. No history of splenectomy. PSYCHIATRIC: No history of depression or anxiety. ENDOCRINOLOGIC: No reports of sweating, cold or heat intolerance. No polyuria or polydipsia. ALLERGIES: No history of asthma, hives, eczema or rhinitis. Objective:     Vitals:    11/21/22 2304 11/22/22 0243 11/22/22 0630 11/22/22 0800   BP: 110/62 118/67  (!) 126/58   Pulse: 73 67 63 62   Resp: 18 18  16   Temp: 99.5 °F (37.5 °C) 99.2 °F (37.3 °C)  98.1 °F (36.7 °C)   TempSrc: Oral Oral  Oral   SpO2: 97% 97%  99%   Weight:       Height:            Physical Exam:  General - Well developed, well nourished, in no apparent distress. Pleasant and conversant. HEENT - Normocephalic, atraumatic. Conjunctiva, tympanic membranes, and oropharynx are clear. Neck - Supple without masses, no bruits   Cardiovascular - Regular rate and rhythm. Normal S1, S2 without murmurs, rubs, or gallops. Lungs - Clear to auscultation. Abdomen - Soft, nontender with normal bowel sounds. Extremities - Peripheral pulses intact. No edema and no rashes. Neurological examination -   Comprehension, attention , and reasoning are intact. Memory is poor.   Language and speech are normal. On cranial nerve examination pupils are equal round and reactive to light. Fundoscopic examination is normal. Visual acuity is adequate. Visual fields are full to finger confrontation. Extraocular motility is normal. Face is symmetric and sensation is intact to light touch. Hearing is intact to finger rustle bilaterally. Motor examination - There is normal muscle tone and bulk. Power is full throughout. Muscle stretch reflexes are diminished throughout. Gait and stance not assessed due to fall risk. Cerebellar examination is normal.  Toes are downgoing to plantar stimulation. Lab Results   Component Value Date/Time    CHOL 192 10/01/2021 04:45 AM    HDL 48 10/01/2021 04:45 AM        No results found for: HBA1C, PGU7TBHO     CT Results (most recent): Personally Reviewed   Results for orders placed during the hospital encounter of 11/21/22    CT HEAD WO CONTRAST    Narrative  EXAM: CT HEAD WITHOUT CONTRAST    INDICATION: fall. COMPARISON: 9/1/2019    TECHNIQUE: Contiguous axial images were obtained from the skull base through the  vertex without IV contrast. Radiation dose reduction techniques were used for  this study. Our CT scanners use one or all of the following: Automated exposure  control, adjustment of the mA and/or kV according to patient size, iterative  reconstruction. FINDINGS:  Global parenchymal volume loss and ex vacuo ventriculomegaly. Periventricular  white matter hypoattenuation reflects sequelae of small vessel ischemic disease. No hydrocephalus or midline shift. No extra-axial mass or hemorrhage. The basal  cisterns are patent. The visualized portions of the orbits are normal. The mastoid air cells and  paranasal sinuses are patent. The visualized vascular structures have an unremarkable noncontrast appearance. Right frontal scalp hematoma. Underlying calvarium is intact. Incidental  hyperostosis frontalis externa. Impression  Right frontal scalp hematoma. No acute intracranial abnormality. Avril Hart Assessment and Plan    81year-old woman with a history of recurrent syncope. EEG is reassuring. 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 Recommend monitoring BP with standings and after meals.

## 2022-11-22 NOTE — PLAN OF CARE
Problem: Pain  Goal: Verbalizes/displays adequate comfort level or baseline comfort level  11/22/2022 1604 by Malik White RN  Outcome: Progressing  11/22/2022 0438 by Flor Sam RN  Outcome: Progressing     Problem: Skin/Tissue Integrity  Goal: Absence of new skin breakdown  Description: 1. Monitor for areas of redness and/or skin breakdown  2. Assess vascular access sites hourly  3. Every 4-6 hours minimum:  Change oxygen saturation probe site  4. Every 4-6 hours:  If on nasal continuous positive airway pressure, respiratory therapy assess nares and determine need for appliance change or resting period.   11/22/2022 1604 by Malik White RN  Outcome: Progressing  11/22/2022 0438 by Flor Sam RN  Outcome: Progressing     Problem: Safety - Adult  Goal: Free from fall injury  11/22/2022 1604 by Malik White RN  Outcome: Progressing  11/22/2022 0438 by Flor Sam RN  Outcome: Progressing  Flowsheets (Taken 11/22/2022 0244)  Free From Fall Injury: [address] family/caregiver on patient safety     Problem: ABCDS Injury Assessment  Goal: Absence of physical injury  11/22/2022 1604 by Malik White RN  Outcome: Progressing  11/22/2022 0438 by Flor Sam RN  Outcome: Progressing  Flowsheets (Taken 11/22/2022 0244)  Absence of Physical Injury: Implement safety measures based on patient assessment

## 2022-11-22 NOTE — CONSULTS
Comprehensive Nutrition Assessment    Type and Reason for Visit: Initial, Positive Nutrition Screen  Malnutrition Screening Tool: Malnutrition Screen  Have you recently lost weight without trying?: 2 to 13 pounds (1 point)  Have you been eating poorly because of a decreased appetite?: Yes (1 point)  Malnutrition Screening Tool Score: 2    Nutrition Recommendations/Plan:   Meals and Snacks:  Diet: Continue current order  Nutrition Supplement Therapy:  Medical food supplement therapy:  Initiate Glucerna Shake three times per day (this provides 220 kcal and 10 grams protein per bottle)     Malnutrition Assessment:  Malnutrition Status: At risk for malnutrition (Comment) (dementia, poor appetite reported, variable intake here)    No eh wasting  Nutrition Assessment:  Nutrition History: Patient states that she eats 2 meals per day at baseline. She states that she eats cottage cheese and a banana for breakfast and her  prepares a full meal for dinner. She states that she does not really snack. She states that she used to weigh 200# about 3 years ago and reports some intentional weight loss. She states that she was recently at an MD appointment and weighed 150# and MD advised no further weight loss. Noted 195# in 2017 and 150# 9/27/22 office weights. Do You Have Any Cultural, [place]ian, or Ethnic Food Preferences?: No   Nutrition Background:       Patient with PMH significant for recent breast cancer diagnosis, DM, diverticulitis, CAD, TVAR, COPD, dementia, HTN, HLD, CVA with left eye blindness. She presented with LOC usually occurring with meals. Nutrition Interval:  Patient seen reclined in bed. She is alert. She is mildly confused but able to answer questions appropriately when given extra time. She initially states that she cannot remember if she ate breakfast. She then states that she can remember and ate well. This is consistent with nursing documentation.  She states that she ate her bread at lunch and saved her grapes for later. Noted grapes on side table. Later during interview she does admit to lower than usual appetite. Current Nutrition Therapies:  ADULT DIET; Regular; 3 carb choices (45 gm/meal); Low Fat/Low Chol/High Fiber/TYRONE    Current Intake:   Average Meal Intake:  (variable 0% lunch, 100% breakfast)        Anthropometric Measures:  Height: 5' (152.4 cm)  Current Body Wt: 154 lb 12.2 oz (70.2 kg) (11/22), Weight source: Bed Scale  BMI: 30.2, Obese Class 1 (BMI 30.0-34. 9)  Admission Body Weight: 154 lb (69.9 kg) (11/21 stated)  Ideal Body Weight (Kg) (Calculated): 45 kg (100 lbs), 154.8 %  Usual Body Wt: 154 lb (69.9 kg) (4/22/22 offive visit, limited hitory otherwise), Percent weight change: 0.5       Edema:Peripheral Vascular  Peripheral Vascular (WDL): Within Defined Limits   BMI Category Obese Class 1 (BMI 30.0-34. 9)  Estimated Daily Nutrient Needs:  Energy (kcal/day): 7562-6856 (Kcal/kg (18-23) Weight used: 70.2 kg Current  Protein (g/day): 63-76 (20%) Weight Used: (Other (Comment))    Fluid (ml/day):   (1 ml/kcal)    Nutrition Diagnosis:   Predicted inadequate energy intake related to cognitive or neurological impairment (low appetite) as evidenced by  (reported barrier to PO, intake as above)  Nutrition Interventions:   Food and/or Nutrient Delivery: Continue Current Diet, Start Oral Nutrition Supplement     Coordination of Nutrition Care: Continue to monitor while inpatient       Goals: Active Goal: Meet at least 75% of estimated needs       Nutrition Monitoring and Evaluation:      Food/Nutrient Intake Outcomes: Diet Advancement/Tolerance, Food and Nutrient Intake  Physical Signs/Symptoms Outcomes: Meal Time Behavior, Weight    Discharge Planning:     Too soon to determine    ZACH GOMEZ RD

## 2022-11-22 NOTE — PROGRESS NOTES
END OF SHIFT SUMMARY:    Significant vitals this shift:    Vitals:    11/21/22 1830 11/21/22 1923 11/21/22 2304 11/22/22 0243   BP:  130/65 110/62 118/67   Pulse: 78 71 73 67   Resp:  18 18 18   Temp:  98.9 °F (37.2 °C) 99.5 °F (37.5 °C) 99.2 °F (37.3 °C)   TempSrc:  Oral Oral Oral   SpO2:  100% 97% 97%   Weight:  154 lb 5.2 oz (70 kg)     Height:          Significant labs this shift:  0  Tests performed this shift:  0  Orders to be followed up on:    q4 neuro checks  Orthostatic vitals daily  Blood products given this shift:  0  Additional events this shift:   Patient remained confused at night.      Report given to oncjihan Foster    I/Os:  +/- this shift:   240 PO in  200 Urine out  Occurrences this Shift:  Urine 0, BM 0, Emesis 0    Nolvia Hendricks RN

## 2022-11-22 NOTE — PROGRESS NOTES
Hospitalist Progress Note   Admit Date:  2022 11:40 AM   Name:  Jozef Man   Age:  81 y.o. Sex:  female  :  1940   MRN:  [id number]   Room:  [facility]/    Presenting Complaint: Loss of Consciousness     Reason(s) for Admission: Syncope and collapse [R55]  Closed head injury, initial encounter [S09.90XA]  Recurrent syncope [R55]     Hospital Course:   Jozef Man is a 81 y.o. 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 CT head w/ R frontal scalp hematoma. Thoracic spine XR, left wrist XR, humerus left XR and left shoulder XR with no acute findings. C spine no acute findings, multilevel degenerative changes. CXR no acute findings. She was admitted to the Hospitalist service and Neurology was consulted. Reassuring EEG. Likely dysautonomia in setting of DM2. Subjective & 24hr Events (22): The patient sat up in bed while I was in the room. She said she felt \"a little faint\" while eating her meal earlier but she never lost consciousness. Diabetes educator will discuss recommendations with the patient and her spouse tomorrow morning. The patient hopes to return home tomorrow. PT/OT recommend Home health. Assessment & Plan:     Recurrent syncope  Remote tele, check orthostats  Possibly post prandially hypotension from DM autonomic/polyneuropathy vs non convulsive seizures or adverse effects of drugs such as TCA. Fall, seizure precautions, PT/OT, PPD   EEG was negative for epileptiform discharges  Neurology recommendations appreciated     Chronic heart failure with preserved ejection fraction (HFpEF) ([facility]  Last TTE with perserved LVEF, stable prosthestic AV valve. LV diastolic dysfunction. Clinically compensated. On SLGT2i @ home.       Thrush, oral  Start clotrimazole troch 10 mg TID x 7 days  FU with PCP if not improving, consider alternative diagnosis      Infiltrating ductal carcinoma of breast, right ([facility]  Strong family history. Recently diagnosed. Treatment not yet started. GERD (gastroesophageal reflux disease)  Continue PPI     Polyneuropathy associated with underlying disease ([facility]  Glycemic control. Gabapentin 600 mg BID  Vitamin B12 low normal; give cyanocobalamin IM starting Nov/22     COPD (chronic obstructive pulmonary disease) ([facility])  Not in acute exacerbation. Albuterol prn      Coronary artery disease involving native coronary artery of native heart  Stable. Troponin negative. Cont DAPT, statin, BB, ARB     S/p TAVR (transcatheter aortic valve replacement), bioprosthetic  Stable. Continue antiplatelets, statin, BB      Type 2 diabetes mellitus with diabetic nephropathy, with long-term current use of insulin ([facility])  Uncontrolled. Hold PO meds, start Lantus qhs plus moderate SSI. Consulted DM educator     At high risk for falls  PT/OT recommends Home health      Recurrent depression ([facility]  Resume cymbalta 30 mg daily, recommend increasing to 60 mg daily and discontining elavil. Will hold elavil for now d/t side effects. Anticipated discharge needs: Pending      Diet:  ADULT DIET; Regular; 3 carb choices (45 gm/meal);  Low Fat/Low Chol/High Fiber/TYRONE  DVT PPx: enoxaparin  Code status: Full Code    Hospital Problems:  Principal Problem:    Recurrent syncope  Active Problems:    Infiltrating ductal carcinoma of breast, right (HCC)    Thrush, oral    Chronic heart failure with preserved ejection fraction (HFpEF) ([facility])    Recurrent depression ([facility])    At high risk for falls    Type 2 diabetes mellitus with diabetic nephropathy, with long-term current use of insulin ([facility])    S/p TAVR (transcatheter aortic valve replacement), bioprosthetic    Primary hypertension    Coronary artery disease involving native coronary artery of native heart COPD (chronic obstructive pulmonary disease) (HCC)    Polyneuropathy associated with underlying disease (HCC)    GERD (gastroesophageal reflux disease)  Resolved Problems:    * No resolved hospital problems. *      Objective:   Patient Vitals for the past 24 hrs:   Temp Pulse Resp BP SpO2   11/22/22 1149 97.6 °F (36.4 °C) 60 17 123/60 99 %   11/22/22 0800 98.1 °F (36.7 °C) 62 16 (!) 126/58 99 %   11/22/22 0630 -- 63 -- -- --   11/22/22 0243 99.2 °F (37.3 °C) 67 18 118/67 97 %   11/21/22 2304 99.5 °F (37.5 °C) 73 18 110/62 97 %   11/21/22 1923 98.9 °F (37.2 °C) 71 18 130/65 100 %   11/21/22 1830 -- 78 -- -- --   11/21/22 1715 -- 67 -- -- --   11/21/22 1700 -- 76 12 -- --   11/21/22 1645 -- 73 14 -- --   11/21/22 1630 -- 71 10 -- 97 %   11/21/22 1615 -- 81 23 -- --   11/21/22 1600 -- 89 21 -- 96 %   11/21/22 1545 -- 67 10 -- 96 %   11/21/22 1530 -- 65 14 -- 94 %       Oxygen Therapy  SpO2: 99 %  Pulse via Oximetry: 70 beats per minute  O2 Device: None (Room air)    Estimated body mass index is 30.23 kg/m² as calculated from the following:    Height as of this encounter: 5' (1.524 m). Weight as of this encounter: 154 lb 12.2 oz (70.2 kg). Intake/Output Summary (Last 24 hours) at 11/22/2022 1520  Last data filed at 11/22/2022 0830  Gross per 24 hour   Intake 360 ml   Output 500 ml   Net -140 ml         Physical Exam:   Blood pressure 123/60, pulse 60, temperature 97.6 °F (36.4 °C), temperature source Oral, resp. rate 17, height 5' (1.524 m), weight 154 lb 12.2 oz (70.2 kg), SpO2 99 %. General:    No cardiopulmonary distress   Head:  Normocephalic, bruising to R side of face  Eyes:  Sclerae appear normal.  Pupils equally round. ENT:  Nares appear normal, no drainage. Moist oral mucosa  Neck:  No restricted ROM. Trachea midline   CV:   RRR. No m/r/g. Lungs: No wheezing. No tachypnea on room air. Abdomen:   Soft, nontender, nondistended. Extremities: No cyanosis or clubbing.   No edema  Skin:     No rashes and normal coloration. Warm and dry. Neuro:  A&Ox3  Psych:  Normal mood and affect.       I have personally reviewed labs and tests showing:  Recent Labs:  Recent Results (from the past 48 hour(s))   EKG 12 Lead    Collection Time: 11/21/22 11:38 AM   Result Value Ref Range    Ventricular Rate 72 BPM    Atrial Rate 72 BPM    P-R Interval 194 ms    QRS Duration 101 ms    Q-T Interval 419 ms    QTc Calculation (Bazett) 459 ms    P Axis 60 degrees    R Axis -62 degrees    T Axis 59 degrees    Diagnosis Sinus rhythm    CBC with Auto Differential    Collection Time: 11/21/22 11:43 AM   Result Value Ref Range    WBC 9.1 4.3 - 11.1 K/uL    RBC 4.03 (L) 4.05 - 5.2 M/uL    Hemoglobin 12.9 11.7 - 15.4 g/dL    Hematocrit 36.9 35.8 - 46.3 %    MCV 91.6 82 - 102 FL    MCH 32.0 26.1 - 32.9 PG    MCHC 35.0 31.4 - 35.0 g/dL    RDW 12.3 11.9 - 14.6 %    Platelets 287 390 - 963 K/uL    MPV 10.4 9.4 - 12.3 FL    nRBC 0.00 0.0 - 0.2 K/uL    Differential Type AUTOMATED      Seg Neutrophils 85 (H) 43 - 78 %    Lymphocytes 9 (L) 13 - 44 %    Monocytes 5 4.0 - 12.0 %    Eosinophils % 1 0.5 - 7.8 %    Basophils 0 0.0 - 2.0 %    Immature Granulocytes 0 0.0 - 5.0 %    Segs Absolute 7.7 1.7 - 8.2 K/UL    Absolute Lymph # 0.8 0.5 - 4.6 K/UL    Absolute Mono # 0.5 0.1 - 1.3 K/UL    Absolute Eos # 0.1 0.0 - 0.8 K/UL    Basophils Absolute 0.0 0.0 - 0.2 K/UL    Absolute Immature Granulocyte 0.0 0.0 - 0.5 K/UL   Comprehensive Metabolic Panel    Collection Time: 11/21/22 11:43 AM   Result Value Ref Range    Sodium 139 133 - 143 mmol/L    Potassium 4.3 3.5 - 5.1 mmol/L    Chloride 104 101 - 110 mmol/L    CO2 29 21 - 32 mmol/L    Anion Gap 6 2 - 11 mmol/L    Glucose 320 (H) 65 - 100 mg/dL    BUN 10 8 - 23 MG/DL    Creatinine 0.90 0.6 - 1.0 MG/DL    Est, Glom Filt Rate >60 >60 ml/min/1.73m2    Calcium 9.6 8.3 - 10.4 MG/DL    Total Bilirubin 0.6 0.2 - 1.1 MG/DL    ALT 17 12 - 65 U/L    AST 11 (L) 15 - 37 U/L    Alk Phosphatase 141 (H) 50 - 136 U/L Total Protein 7.1 6.3 - 8.2 g/dL    Albumin 3.4 3.2 - 4.6 g/dL    Globulin 3.7 2.8 - 4.5 g/dL    Albumin/Globulin Ratio 0.9 0.4 - 1.6     Magnesium    Collection Time: 11/21/22 11:43 AM   Result Value Ref Range    Magnesium 1.8 1.8 - 2.4 mg/dL   Troponin    Collection Time: 11/21/22 11:43 AM   Result Value Ref Range    Troponin, High Sensitivity 7.9 0 - 14 pg/mL   D-Dimer, Quantitative    Collection Time: 11/21/22 11:43 AM   Result Value Ref Range    D-Dimer, Quant 0.51 <0.56 ug/ml(FEU)   Urinalysis w rflx microscopic    Collection Time: 11/21/22  2:29 PM   Result Value Ref Range    Color, UA YELLOW/STRAW      Appearance CLOUDY      Specific Gravity, UA 1.025 (H) 1.001 - 1.023      pH, Urine 6.5 5.0 - 9.0      Protein, UA Negative NEG mg/dL    Glucose, UA >1000 mg/dL    Ketones, Urine Negative NEG mg/dL    Bilirubin Urine Negative NEG      Blood, Urine Negative NEG      Urobilinogen, Urine 1.0 0.2 - 1.0 EU/dL    Nitrite, Urine Negative NEG      Leukocyte Esterase, Urine MODERATE (A) NEG      RBC, UA 10-20 0 /hpf    BACTERIA, URINE 0 0 /hpf    Other observations RESULTS VERIFIED MANUALLY     POCT Glucose    Collection Time: 11/21/22  6:23 PM   Result Value Ref Range    POC Glucose 262 (H) 65 - 100 mg/dL    Performed by: Mejia Escamilla    POCT Glucose    Collection Time: 11/21/22  9:17 PM   Result Value Ref Range    POC Glucose 342 (H) 65 - 100 mg/dL    Performed by: Matteo Jacobsen    CBC    Collection Time: 11/22/22  6:39 AM   Result Value Ref Range    WBC 7.2 4.3 - 11.1 K/uL    RBC 3.83 (L) 4.05 - 5.2 M/uL    Hemoglobin 12.4 11.7 - 15.4 g/dL    Hematocrit 34.7 (L) 35.8 - 46.3 %    MCV 90.6 82 - 102 FL    MCH 32.4 26.1 - 32.9 PG    MCHC 35.7 (H) 31.4 - 35.0 g/dL    RDW 12.0 11.9 - 14.6 %    Platelets 094 (L) 813 - 450 K/uL    MPV 10.2 9.4 - 12.3 FL    nRBC 0.00 0.0 - 0.2 K/uL   Basic Metabolic Panel    Collection Time: 11/22/22  6:39 AM   Result Value Ref Range    Sodium 138 133 - 143 mmol/L    Potassium 4.0 3.5 - 5.1 mmol/L    Chloride 104 101 - 110 mmol/L    CO2 27 21 - 32 mmol/L    Anion Gap 7 2 - 11 mmol/L    Glucose 294 (H) 65 - 100 mg/dL    BUN 10 8 - 23 MG/DL    Creatinine 0.70 0.6 - 1.0 MG/DL    Est, Glom Filt Rate >60 >60 ml/min/1.73m2    Calcium 9.3 8.3 - 10.4 MG/DL   Magnesium    Collection Time: 11/22/22  6:39 AM   Result Value Ref Range    Magnesium 2.2 1.8 - 2.4 mg/dL   TSH with Reflex    Collection Time: 11/22/22  6:39 AM   Result Value Ref Range    TSH w Free Thyroid if Abnormal 1.38 0.358 - 3.740 UIU/ML   Hemoglobin A1c    Collection Time: 11/22/22  6:39 AM   Result Value Ref Range    Hemoglobin A1C 8.9 (H) 4.8 - 5.6 %    eAG 209 mg/dL   Vitamin B12    Collection Time: 11/22/22  6:39 AM   Result Value Ref Range    Vitamin B-12 201 193 - 986 pg/mL   POCT Glucose    Collection Time: 11/22/22  7:57 AM   Result Value Ref Range    POC Glucose 306 (H) 65 - 100 mg/dL    Performed by: Mayen (Crocker)    POCT Glucose    Collection Time: 11/22/22 11:36 AM   Result Value Ref Range    POC Glucose 274 (H) 65 - 100 mg/dL    Performed by: Arvin        I have personally reviewed imaging studies showing: Other Studies:  XR CHEST PORTABLE   Final Result   No radiographic evidence of acute cardiopulmonary disease. XR SHOULDER LEFT (MIN 2 VIEWS)   Final Result   No acute radiographic abnormality of the left shoulder or humerus. XR HUMERUS LEFT (MIN 2 VIEWS)   Final Result   No acute radiographic abnormality of the left shoulder or humerus. XR WRIST LEFT (MIN 3 VIEWS)   Final Result   No acute radiographic abnormality. XR THORACIC SPINE (2 VIEWS)   Final Result   No acute radiographic abnormality. CT HEAD WO CONTRAST   Final Result   Right frontal scalp hematoma. No acute intracranial abnormality. .         CT CERVICAL SPINE WO CONTRAST   Final Result   No acute traumatic abnormality of the cervical spine.                 Current Meds:  Current Facility-Administered Medications Medication Dose Route Frequency    cyanocobalamin injection 1,000 mcg  1,000 mcg IntraMUSCular Daily    aspirin EC tablet 81 mg  81 mg Oral Daily    insulin glargine (LANTUS) injection vial 20 Units  20 Units SubCUTAneous Nightly    sodium chloride flush 0.9 % injection 5-40 mL  5-40 mL IntraVENous 2 times per day    sodium chloride flush 0.9 % injection 5-40 mL  5-40 mL IntraVENous PRN    0.9 % sodium chloride infusion   IntraVENous PRN    enoxaparin (LOVENOX) injection 40 mg  40 mg SubCUTAneous Daily    ondansetron (ZOFRAN-ODT) disintegrating tablet 4 mg  4 mg Oral Q8H PRN    Or    ondansetron (ZOFRAN) injection 4 mg  4 mg IntraVENous Q6H PRN    polyethylene glycol (GLYCOLAX) packet 17 g  17 g Oral Daily PRN    acetaminophen (TYLENOL) tablet 650 mg  650 mg Oral Q6H PRN    Or    acetaminophen (TYLENOL) suppository 650 mg  650 mg Rectal Q6H PRN    tuberculin injection 5 Units  5 Units IntraDERmal Once    potassium chloride (KLOR-CON M) extended release tablet 40 mEq  40 mEq Oral PRN    Or    potassium bicarb-citric acid (EFFER-K) effervescent tablet 40 mEq  40 mEq Oral PRN    Or    potassium chloride 10 mEq/100 mL IVPB (Peripheral Line)  10 mEq IntraVENous PRN    magnesium sulfate 2000 mg in 50 mL IVPB premix  2,000 mg IntraVENous PRN    aluminum & magnesium hydroxide-simethicone (MAALOX) 200-200-20 MG/5ML suspension 30 mL  30 mL Oral Q6H PRN    glucose chewable tablet 16 g  4 tablet Oral PRN    dextrose bolus 10% 125 mL  125 mL IntraVENous PRN    Or    dextrose bolus 10% 250 mL  250 mL IntraVENous PRN    glucagon (rDNA) injection 1 mg  1 mg SubCUTAneous PRN    dextrose 10 % infusion   IntraVENous Continuous PRN    insulin lispro (HUMALOG) injection vial 0-8 Units  0-8 Units SubCUTAneous TID WC    insulin lispro (HUMALOG) injection vial 0-4 Units  0-4 Units SubCUTAneous Nightly    atorvastatin (LIPITOR) tablet 40 mg  40 mg Oral Daily    clotrimazole (MYCELEX) maryann 10 mg  10 mg Oral 5x Daily    clopidogrel (PLAVIX) tablet 75 mg  75 mg Oral Daily    pantoprazole (PROTONIX) tablet 40 mg  40 mg Oral QAM AC    albuterol (PROVENTIL) nebulizer solution 2.5 mg  2.5 mg Nebulization Q6H PRN    gabapentin (NEURONTIN) capsule 600 mg  600 mg Oral BID PRN    DULoxetine (CYMBALTA) extended release capsule 30 mg  30 mg Oral Daily    losartan (COZAAR) tablet 50 mg  50 mg Oral Daily    metoprolol succinate (TOPROL XL) extended release tablet 50 mg  50 mg Oral Daily       Signed:  GREGG Dove - CNP    Part of this note may have been written by using a voice dictation software. The note has been proof read but may still contain some grammatical/other typographical errors.

## 2022-11-22 NOTE — DIABETES MGMT
Patient admitted with recurrent syncope. Admitting blood glucose 320. HbA1c 8.9 (). Blood glucose ranged 262-342 yesterday with patient receiving Lantus 17 units and Humalog 8 units. Blood glucose this morning was 306. Most recent FSBS 274. Creatinine 0.70. GFR >60. Reviewed patient current regimen: Lantus 17 units nightly and Humalog correctional insulin. Patient would likely benefit from an increase in basal insulin as fasting blood glucose is not at goal. Provider updated via Helishopter regarding recommendations and patient glycemic control. Patient in bed resting quietly, no visitors present. Per chart review oriented and disoriented at times. Per chart review patient also has history of dementia. Attempted to call patient spouse and patient daughter-in-law no answers received. Primary RN updated as educational time needs to be set up with patient family for discharge planning.  Primary RN able to speak with patient  via phone and educational time scheduled for [phone] tomorrow AM.

## 2022-11-22 NOTE — PROGRESS NOTES
ACUTE OCCUPATIONAL THERAPY GOALS:   (Developed with and agreed upon by patient and/or caregiver.)  (1.) Laverne Vines  will complete lower body bathing and dressing with MINIMAL ASSIST and adaptive equipment as needed. (2.) Laverne Vines will complete toileting with SUPERVISION. (3.) Laverne Vines will tolerate 25 minutes of OT treatment with 1-2 rest breaks to increase activity tolerance for ADLs. (4.) Laverne Vines will complete functional transfers with SUPERVISION and adaptive equipment as needed. (5.) Laverne Vines will complete functional ADL task standing at sink SUPERVISION and adaptive equipment as needed. Timeframe: 7 visits       OCCUPATIONAL THERAPY Initial Assessment and Daily Note       OT Visit Days: 1  Acknowledge Orders  Time  OT Charge Capture  Rehab Caseload Tracker      Laverne Vines is a 81 y.o. female   PRIMARY DIAGNOSIS: Recurrent syncope  Syncope and collapse [R55]  Closed head injury, initial encounter [S09.90XA]  Recurrent syncope [R55]       Reason for Referral: Generalized Muscle Weakness (M62.81)  Difficulty in walking, Not elsewhere classified (R26.2)  Other abnormalities of gait and mobility (R26.89)  Repeated Falls (R29.6)  History of falling (Z91.81)  Observation: Payor: [facility] MEDICARE / Plan: [employer] / Product Type: *No Product type* /     ASSESSMENT:     REHAB RECOMMENDATIONS:   Recommendation to date pending progress:  Setting:  Home Health Therapy    Equipment:    To Be Determined     ASSESSMENT:  Ms. Almaz Lazo is a 82 y/o female who presents with recurrent syncope. During her most recent fall, she hit her head with hematoma on R side of head. Additionally, recent breast CA diagnosis, hx of CVA with L eye blindness, and dementia. At baseline pt had been independent but recently has been requiring a lot of assistance from her . Unsure on PLOF,  was intermittently present and provided some background.  This date, mod A for bed mobility and CGA for functional mobility with RW. Min A for toileting and LBD. Set up assist for UBD. Today pt presents with decreased activity tolerance, strength, and balance impacting ADLs. Pt is currently functioning below baseline and would benefit from skilled OT services to address OT goals and plan of care. Rec HHOT at d/c. Perlita Garciasjackeline      [address] [place] [address] [address] [id number] AM-PAC 6 Clicks Daily Activity Inpatient Short Form:    AM-PAC Daily Activity Inpatient   How much help for putting on and taking off regular lower body clothing?: A Little  How much help for Bathing?: A Little  How much help for Toileting?: A Little  How much help for putting on and taking off regular upper body clothing?: None  How much help for taking care of personal grooming?: None  How much help for eating meals?: None  AM-[facility] Inpatient Daily Activity Raw Score: 21  AM-PAC Inpatient ADL T-Scale Score : 44.27  ADL Inpatient CMS 0-100% Score: 32.79  ADL Inpatient CMS G-Code Modifier : CJ           SUBJECTIVE:     Ms. Mery Bagley states, \"I kind of feel drunk\"     Social/Functional Lives With: Spouse  Type of Home: Mobile home  Home Layout: One level  Home Access: Stairs to enter without rails  Entrance Stairs - Number of Steps: 3  Bathroom Equipment: 3-in-1 commode, Shower chair  Home Equipment: Lupe Jansen, rolling, Rollator, [employer]  Receives Help From: Family  ADL Assistance: Needs assistance  Homemaking Assistance: Needs assistance  Ambulation Assistance: Needs assistance  Transfer Assistance: Independent  Active : No  Mode of Transportation: Family  Occupation: Retired    OBJECTIVE:     Naomi Martinez / Chad Alegria / AIRWAY: Telemetry     RESTRICTIONS/PRECAUTIONS:       PAIN: [place] / O2:   Pre Treatment:    0/10      Post Treatment: 0/10       Vitals          Oxygen            GROSS EVALUATION: INTACT IMPAIRED   (See Comments)   UE AROM [x] []   UE PROM [x] []   Strength []  Generalized weakness but functional     Posture / Balance [] Sitting - Static: Good  Sitting - Dynamic: Fair, +  Standing - Static: Fair  Standing - Dynamic: Fair, -   Sensation []     Coordination []       Tone []       Edema []    Activity Tolerance []  Decreased, limited by fatigue     Hand Dominance R [] L []      COGNITION/  PERCEPTION: INTACT IMPAIRED   (See Comments)   Orientation []  Not formally assessed   Vision []  L eye blindness   Hearing []     Cognition  []  Some confusion noted   Perception []       MOBILITY: I Mod I S SBA CGA Min Mod Max Total  NT x2 Comments:   Bed Mobility    Rolling [] [] [] [] [] [] [] [] [] [] []    Supine to Sit [] [] [] [] [] [] [x] [] [] [] []    Scooting [] [] [] [] [] [] [x] [] [] [] []    Sit to Supine [] [] [] [] [] [] [] [] [] [] []    Transfers    Sit to Stand [] [] [] [] [x] [] [] [] [] [] []    Bed to Chair [] [] [] [] [x] [] [] [] [] [] []    Stand to Sit [] [] [] [] [x] [] [] [] [] [] []    Tub/Shower [] [] [] [] [] [] [] [] [] [] []     Toilet [] [] [] [] [x] [] [] [] [] [] []      [] [] [] [] [] [] [] [] [] [] []    I=Independent, Mod I=Modified Independent, S=Supervision/Setup, SBA=Standby Assistance, CGA=Contact Guard Assistance, Min=Minimal Assistance, Mod=Moderate Assistance, Max=Maximal Assistance, Total=Total Assistance, NT=Not Tested    ACTIVITIES OF DAILY LIVING: I Mod I S SBA CGA Min Mod Max Total NT Comments   BASIC ADLs:              Upper Body Bathing  [] [] [] [] [] [] [] [] [] []    Lower Body Bathing [] [] [] [] [] [] [] [] [] []    Toileting [] [] [] [] [] [x] [] [] [] [] For hygiene following BM   Upper Body Dressing [] [] [x] [] [] [] [] [] [] [] Seated   Lower Body Dressing [] [] [] [] [] [x] [] [] [] [] Seated/standing LBD   Feeding [] [] [] [] [] [] [] [] [] []    Grooming [] [] [] [] [] [] [] [] [] []    Personal Device Care [] [] [] [] [] [] [] [] [] []    Functional Mobility [] [] [] [] [x] [] [] [] [] [] With RW in room and daugherty   I=Independent, Mod I=Modified Independent, S=Supervision/Setup, SBA=Standby Assistance, CGA=Contact Guard Assistance, Min=Minimal Assistance, Mod=Moderate Assistance, Max=Maximal Assistance, Total=Total Assistance, NT=Not Tested    PLAN:   [facility] of Care: 3 times/week for duration of hospital stay or until stated goals are met, whichever comes first.    PROBLEM LIST:   (Skilled intervention is medically necessary to address:)  Decreased ADL/Functional Activities  Decreased Activity Tolerance  Decreased Balance  Decreased Cognition  Decreased Coordination  Decreased Gait Ability  Decreased Safety Awareness  Decreased Strength  Decreased Transfer Abilities   INTERVENTIONS PLANNED:  (Benefits and precautions of occupational therapy have been discussed with the patient.)  Self Care Training  Therapeutic Activity  Therapeutic Exercise/HEP  Neuromuscular Re-education  Manual Therapy  Education         TREATMENT:     EVALUATION: MODERATE COMPLEXITY: (Untimed Charge)    TREATMENT:   Co-Treatment PT/OT necessary due to patient's decreased overall endurance/tolerance levels, as well as need for high level skilled assistance to complete functional transfers/mobility and functional tasks  Self Care (30 minutes): Patient participated in toileting, upper body dressing, and lower body dressing ADLs in unsupported sitting and standing with minimal verbal and manual cueing to increase independence and decrease assistance required. Patient also participated in functional mobility and functional transfer training to increase independence, decrease assistance required, increase activity tolerance, and increase safety awareness. TREATMENT GRID:  N/A    AFTER TREATMENT PRECAUTIONS: Alarm Activated, Bed/Chair Locked, Call light within reach, Chair, Needs within reach, RN notified, and Visitors at bedside    INTERDISCIPLINARY COLLABORATION:  RN/ PCT and PT/ PTA    EDUCATION:  Education Given To: Patient; Family  Education Provided: Role of Therapy  Education Method: Verbal  Barriers to Learning: Cognition  Education Outcome: Continued education needed    TOTAL TREATMENT DURATION AND TIME:  Time In: 1051  Time Out: [address]  Minutes: [address] [facility], Corinna Meigs

## 2022-11-22 NOTE — CARE COORDINATION
Case Management Assessment  Initial Evaluation    Date/Time of Evaluation: 11/22/2022 9:01 AM  Assessment Completed by: ISHAN Alexander    If patient is discharged prior to next notation, then this note serves as note for discharge by case management. Patient Name: Adriana Hall                   YOB: 1940  Diagnosis: Syncope and collapse [R55]  Closed head injury, initial encounter [S09.90XA]  Recurrent syncope [R55]                   Date / Time: 11/21/2022 11:40 AM    Patient Admission Status: Observation   Readmission Risk (Low < 19, Mod (19-27), High > 27): No data recorded  Current PCP: Vero Shah MD  PCP verified by CM? Yes (Willian Reynoso MD)    Chart Reviewed: Yes      History Provided by: Medical Record, Patient  Patient Orientation: Alert and Oriented, Person, Self    Patient Cognition: Dementia / Early Alzheimer's    Hospitalization in the last 30 days (Readmission):  No    If yes, Readmission Assessment in  Navigator will be completed.     Advance Directives:      Code Status: Full Code   Patient's Primary Decision Maker is: Legal Next of Kin    Primary Decision Maker: Abimael Sam - Spouse - [phone]    Secondary Decision Maker: Jerry Law - Daughter-in-Law - [phone]    Discharge Planning:    Patient lives with: Spouse/Significant Other Type of Home: Trailer/Mobile Home  Primary Care Giver: Spouse  Patient Support Systems include: Spouse/Significant Other, Children, Family Members   Current Financial resources: Medicare ([facility])  Current community resources:    Current services prior to admission: Durable Medical Equipment            Current DME: Shower Chair, Bedside Commode, Walker, Wheelchair, Other (Comment) (Rollator)            Type of Home Care services:  None    ADLS  Prior functional level: Assistance with the following:, Mobility, Shopping, Housework, Cooking  Current functional level: Assistance with the following:, Mobility, Shopping, Housework, Cooking    PT AM-PAC:   /24  OT AM-PAC:   /24    Family can provide assistance at DC: Yes  Would you like Case Management to discuss the discharge plan with any other family members/significant others, and if so, who? Yes (Spouse)  Plans to Return to Present Housing: Yes  Other Identified Issues/Barriers to RETURNING to current housing: No current [facility] services  Potential Assistance needed at discharge: [address]            Potential DME:  None identified  Patient expects to discharge to: Trailer/mobile home  Plan for transportation at discharge: Family    Financial    Payor: Clayton Lopez / Plan: Luis Armando Benavides / Product Type: *No Product type* /     Does insurance require precert for SNF: Yes    Potential assistance Purchasing Medications: No      [id number] [address] [phone]  [phone]  Phone: [phone] Fax: [phone]      Notes:    Factors facilitating achievement of predicted outcomes: Family support, Cooperative, Pleasant, and Has needed Durable Medical Equipment at home    Barriers to discharge: Cognitive deficit, Limited insight into deficits, and Medical complications    Additional Case Management Notes: 82 y/o female pt who resides with her spouse Mary Joaquin [phone]. Pt's DIL Ashley Harrington [phone] is also involved in pt's care. No family at the bedside. Pt has Dx of Dementia and during conversation she was A/O x2 - unable to recall phone numbers or time frames. Pt was able to list her home DME. Pt has all needed home DME (except for home O2 and she is not currently requiring O2). She states she is not current with a [place] agency. PT/OT consults have been ordered. CM is awaiting recommendations. Anticipate pt will d/c home with spouse and [facility] services but awaiting PT/OT evaluations. CM will continue to follow and remain available if any needs arise.     The Plan for Transition of Care is related to the following treatment goals of Syncope and collapse [R55]  Closed head injury, initial encounter [S09.90XA]  Recurrent syncope [G32]    IF APPLICABLE: The Patient and/or patient representative Darrel Hollingsworth and her family were provided with a choice of provider and agrees with the discharge plan. Freedom of choice list with basic dialogue that supports the patient's individualized plan of care/goals and shares the quality data associated with the providers was provided to: Patient Representative, Patient   Patient Representative Name: Washington Dumont (pt) and Miguel A Holman (spouse)     The Patient and/or Patient Representative Agree with the Discharge Plan?       ISHAN Guzman  Case Management Department

## 2022-11-22 NOTE — PROGRESS NOTES
Initial visit by  to convey care and concern and to explore spiritual needs. Ms. Thomas Varela shared about her hospital stay, and her desire for discharge soon. I offered emotional/spiritual support including prayer as requested. Family arrived during the visit and I conveyed care and concern. Chaplains remain available for follow-up care.      Riddhi Ospina 69  Board Certified

## 2022-11-22 NOTE — PROGRESS NOTES
ACUTE PHYSICAL THERAPY GOALS:   (Developed with and agreed upon by patient and/or caregiver. )  LTG:  (1.)Ms. Donavon Boswell will move from supine to sit and sit to supine , scoot up and down, and roll side to side in bed with INDEPENDENCE within 7 treatment day(s). (2.)Ms. Donavon Boswell will transfer from bed to chair and chair to bed with MODIFIED INDEPENDENCE using the least restrictive device within 7 treatment day(s). (3.)Ms. Donavon Boswell will ambulate with MODIFIED INDEPENDENCE for a minimum of 250 feet with the least restrictive device within 7 treatment day(s). ________________________________________________________________________________________________     PHYSICAL THERAPY Initial Assessment and Daily Note  (Link to Caseload Tracking: PT Visit Days : 1  Acknowledge Orders  Time In/Out  PT Charge Capture  Rehab Caseload Tracker    Keiry Puente is a 81 y.o. female   PRIMARY DIAGNOSIS: Recurrent syncope  Syncope and collapse [R55]  Closed head injury, initial encounter [S09.90XA]  Recurrent syncope [R55]       Reason for Referral: Generalized Muscle Weakness (M62.81)  Difficulty in walking, Not elsewhere classified (R26.2)  Other abnormalities of gait and mobility (R26.89)  Repeated Falls (R29.6)  History of falling (Z91.81)  Observation: Payor: [facility] MEDICARE / Plan: Сергей Pena / Product Type: *No Product type* /     ASSESSMENT:     REHAB RECOMMENDATIONS:   Recommendation to date pending progress:  Setting:  Home Health Therapy    Equipment:    To Be Determined     ASSESSMENT:  Ms. Donavon Boswell presents with frequent falls and syncopal episodes as well as a recent diagnosis of R breast cancer. At baseline she ambulates with a RW and has recently been needing assistance from her  for all functional mobility and ADLs. She also has L eye blindness that impacts her safety awareness and impulsivity with ambulation.  She required mod A to get to the EOB and was having difficulty coordinating mobility and agitated upon entering her room due to increased confusion. Pt ambulated 150' with CGA and VC for improved safety. She demonstrates decreased activity tolerance, safety with ambulation and transfers and overall decreased functional mobility and strength. She would continue to benefit from skilled acute care PT to facilitate improvements in the above stated deficits and promote return to baseline. HHPT recommended at DC. [address] [place] [address] [id number] [facility] 6 Clicks Basic Mobility Inpatient Short Form  AM-PAC Mobility Inpatient   How much difficulty turning over in bed?: A Little  How much difficulty sitting down on / standing up from a chair with arms?: A Little  How much difficulty moving from lying on back to sitting on side of bed?: A Lot  How much help from another person moving to and from a bed to a chair?: A Little  How much help from another person needed to walk in hospital room?: A Little  How much help from another person for climbing 3-5 steps with a railing?: A Lot  AM-PAC Inpatient Mobility Raw Score : 16  AM-PAC Inpatient T-Scale Score : 40.78  Mobility Inpatient CMS 0-100% Score: 54.16  Mobility Inpatient CMS G-Code Modifier : CK    SUBJECTIVE:   Ms. Saleh Harness states, \"Who gave that lady permission to put this stuff in my hair? \"     Social/Functional Lives With: Spouse  Type of Home: Mobile home  Home Layout: One level  Home Access: Stairs to enter without rails  Entrance Stairs - Number of Steps: 3  Bathroom Equipment: 3-in-1 commode, Shower chair  Home Equipment: Bridget Mowers, rolling, Rollator, [employer]  Receives Help From: Family  ADL Assistance: Needs assistance  Homemaking Assistance: Needs assistance  Ambulation Assistance: Needs assistance  Transfer Assistance: Independent  Active : No  Mode of Transportation: Family  Occupation: Retired    OBJECTIVE:     PAIN: VITALS / O2: PRECAUTION / Tiffanie Broody / DRAINS:   Pre Treatment:   Pain Assessment: None - Denies Pain      Post Treatment: 0/10 Vitals        Oxygen      Purewick    RESTRICTIONS/PRECAUTIONS:                    GROSS EVALUATION: Intact Impaired (Comments):   AROM [x]     PROM []    Strength []  Generalized BLE    Balance [] Sitting - Static: Good  Sitting - Dynamic: Fair, +  Standing - Static: Fair  Standing - Dynamic: Fair, -   Posture [] N/A   Sensation [x]     Coordination []      Tone []     Edema []    Activity Tolerance [] Patient limited by fatigue    []      COGNITION/  PERCEPTION: Intact Impaired (Comments):   Orientation []     Vision []     Hearing []     Cognition  []       MOBILITY: I Mod I S SBA CGA Min Mod Max Total  NT x2 Comments:   Bed Mobility    Rolling [] [] [] [] [] [] [] [] [] [x] []    Supine to Sit [] [] [] [] [] [] [x] [] [] [] []    Scooting [] [] [] [] [] [] [x] [] [] [] []    Sit to Supine [] [] [] [] [] [] [] [] [] [x] []    Transfers    Sit to Stand [] [] [] [] [x] [] [] [] [] [] []    Bed to Chair [] [] [] [] [] [] [] [] [] [x] []    Stand to Sit [] [] [] [] [x] [] [] [] [] [] []     [] [] [] [] [] [] [] [] [] [] []    I=Independent, Mod I=Modified Independent, S=Supervision, SBA=Standby Assistance, CGA=Contact Guard Assistance,   Min=Minimal Assistance, Mod=Moderate Assistance, Max=Maximal Assistance, Total=Total Assistance, NT=Not Tested    GAIT: I Mod I S SBA CGA Min Mod Max Total  NT x2 Comments:   Level of Assistance [] [] [] [] [x] [] [] [] [] [] []    Distance 150 feet    DME Rolling Walker    Gait Quality Decreased minerva , Path deviations , and Trunk sway increased    Weightbearing Status      Stairs      I=Independent, Mod I=Modified Independent, S=Supervision, SBA=Standby Assistance, CGA=Contact Guard Assistance,   Min=Minimal Assistance, Mod=Moderate Assistance, Max=Maximal Assistance, Total=Total Assistance, NT=Not Tested    PLAN:   FREQUENCY AND DURATION: 3 times/week for duration of hospital stay or until stated goals are met, whichever comes first.    THERAPY PROGNOSIS: Good    PROBLEM LIST:   (Skilled intervention is medically necessary to address:)  Decreased Activity Tolerance  Decreased AROM/PROM  Decreased Balance  Decreased Cognition  Decreased Coordination  Decreased Gait Ability  Decreased Safety Awareness  Decreased Strength  Decreased Transfer Abilities INTERVENTIONS PLANNED:   (Benefits and precautions of physical therapy have been discussed with the patient.)  Self Care Training  Therapeutic Activity  Therapeutic Exercise/HEP  Neuromuscular Re-education  Gait Training       TREATMENT:   EVALUATION: MODERATE COMPLEXITY: (Untimed Charge)    TREATMENT:   Co-Treatment PT/OT necessary due to patient's decreased overall endurance/tolerance levels, as well as need for high level skilled assistance to complete functional transfers/mobility and functional tasks  Therapeutic Activity (25 Minutes): Therapeutic activity included Supine to Sit, Scooting, Transfer Training, Ambulation on level ground, Sitting balance , and Standing balance to improve functional Activity tolerance, Balance, Coordination, Mobility, and Strength. TREATMENT GRID:  N/A    AFTER TREATMENT PRECAUTIONS: Alarm Activated, Bed/Chair Locked, Call light within reach, Chair, Needs within reach, and RN notified    INTERDISCIPLINARY COLLABORATION:  RN/ PCT, PT/ PTA, and OT/ LYNN    EDUCATION: Education Given To: Patient; Family  Education Provided: Role of Therapy;Plan of Care  Education Method: Verbal  Barriers to Learning: Cognition;None  Education Outcome: Verbalized understanding;Continued education needed    TIME IN/OUT:  Time In: 1051  Time Out: 46  Minutes: [address] [facility] C[facility] Monroy

## 2022-11-22 NOTE — PROCEDURES
Routine EEG Report    Reason for EEG: Syncope    Scheduled Meds:   sodium chloride flush  5-40 mL IntraVENous 2 times per day    enoxaparin  40 mg SubCUTAneous Daily    tuberculin  5 Units IntraDERmal Once    insulin glargine  0.25 Units/kg SubCUTAneous Nightly    insulin lispro  0-8 Units SubCUTAneous TID WC    insulin lispro  0-4 Units SubCUTAneous Nightly    atorvastatin  40 mg Oral Daily    clotrimazole  10 mg Oral 5x Daily    clopidogrel  75 mg Oral Daily    pantoprazole  40 mg Oral QAM AC    DULoxetine  30 mg Oral Daily    losartan  50 mg Oral Daily    metoprolol succinate  50 mg Oral Daily     Continuous Infusions:   sodium chloride      dextrose       PRN Meds:.sodium chloride flush, sodium chloride, ondansetron **OR** ondansetron, polyethylene glycol, acetaminophen **OR** acetaminophen, potassium chloride **OR** potassium alternative oral replacement **OR** potassium chloride, magnesium sulfate, aluminum & magnesium hydroxide-simethicone, glucose, dextrose bolus **OR** dextrose bolus, glucagon (rDNA), dextrose, albuterol, gabapentin      Technical Summary: This EEG was performed with a 32-channel digital EEG machine with electrodes placed according to the international 10-20 system of placement. Background:   During the maximal awake state no posterior dominant rhythm was seen. Anterior background consisted of medium amplitude activity in the primarily in the theta range with occasional intermixed alpha frequencies. Hyperventilation: Hyperventilation was not performed due to medical condition    Photic Stimulation: Photic stimulation was performed with no abnormalities seen    Sleep: None    Impression: The results of this EEG are abnormal.  There is slowing of the background consistent with a mild degree of encephalopathy, nonspecific in origin. There are no lateralizing features or epileptiform discharges.

## 2022-11-22 NOTE — PLAN OF CARE
Problem: Pain  Goal: Verbalizes/displays adequate comfort level or baseline comfort level  Outcome: Progressing     Problem: Skin/Tissue Integrity  Goal: Absence of new skin breakdown  Description: 1. Monitor for areas of redness and/or skin breakdown  2. Assess vascular access sites hourly  3. Every 4-6 hours minimum:  Change oxygen saturation probe site  4. Every 4-6 hours:  If on nasal continuous positive airway pressure, respiratory therapy assess nares and determine need for appliance change or resting period.   Outcome: Progressing     Problem: Safety - Adult  Goal: Free from fall injury  Outcome: Progressing  Flowsheets (Taken 11/22/2022 0244)  Free From Fall Injury: Instruct family/caregiver on patient safety     Problem: ABCDS Injury Assessment  Goal: Absence of physical injury  Outcome: Progressing  Flowsheets (Taken 11/22/2022 0244)  Absence of Physical Injury: Implement safety measures based on patient assessment

## 2022-11-22 NOTE — ACP (ADVANCE CARE PLANNING)
Advance Care Planning     General Advance Care Planning (ACP) Conversation    Date of Conversation: 11/21/2022  Conducted with: Patient with Decision Making Capacity    Healthcare Decision Maker:    Primary Decision Maker: Abimael Sam - Spouse - [phone]    Secondary Decision Maker: Garvin Dry - Daughter-in-Law - [phone]  Click here to complete Healthcare Decision Makers including selection of the Healthcare Decision Maker Relationship (ie \"Primary\"). Today we documented Decision Maker(s) consistent with Legal Next of Kin hierarchy. Content/Action Overview:  Has NO ACP documents/care preferences - refer to ACP Clinical Specialist  Reviewed DNR/DNI and patient elects Full Code (Attempt Resuscitation)  hospitalization preferences and resuscitation preferences  No ACP documents on file. Full Code per physician order.     Length of Voluntary ACP Conversation in minutes:  <16 minutes (Non-Billable)    ISHAN Vidal

## 2022-11-23 VITALS
WEIGHT: 154.63 LBS | SYSTOLIC BLOOD PRESSURE: 127 MMHG | BODY MASS INDEX: 30.36 KG/M2 | RESPIRATION RATE: 18 BRPM | HEART RATE: 59 BPM | OXYGEN SATURATION: 96 % | TEMPERATURE: 98.6 F | HEIGHT: 60 IN | DIASTOLIC BLOOD PRESSURE: 66 MMHG

## 2022-11-23 LAB
GLUCOSE BLD STRIP.AUTO-MCNC: 200 MG/DL (ref 65–100)
SERVICE CMNT-IMP: ABNORMAL

## 2022-11-23 PROCEDURE — 96372 THER/PROPH/DIAG INJ SC/IM: CPT

## 2022-11-23 PROCEDURE — G0378 HOSPITAL OBSERVATION PER HR: HCPCS

## 2022-11-23 PROCEDURE — 82962 GLUCOSE BLOOD TEST: CPT

## 2022-11-23 PROCEDURE — 6360000002 HC RX W HCPCS: Performed by: PSYCHIATRY & NEUROLOGY

## 2022-11-23 PROCEDURE — 6370000000 HC RX 637 (ALT 250 FOR IP): Performed by: NURSE PRACTITIONER

## 2022-11-23 PROCEDURE — 6370000000 HC RX 637 (ALT 250 FOR IP): Performed by: FAMILY MEDICINE

## 2022-11-23 RX ORDER — CLOTRIMAZOLE 10 MG/1
10 LOZENGE ORAL; TOPICAL 3 TIMES DAILY
Qty: 15 TABLET | Refills: 0 | Status: SHIPPED | OUTPATIENT
Start: 2022-11-23 | End: 2022-11-28

## 2022-11-23 RX ORDER — LANCETS 30 GAUGE
1 EACH MISCELLANEOUS 4 TIMES DAILY
Qty: 200 EACH | Refills: 0 | Status: SHIPPED | OUTPATIENT
Start: 2022-11-23

## 2022-11-23 RX ORDER — BLOOD-GLUCOSE METER
1 KIT MISCELLANEOUS DAILY
Qty: 1 KIT | Refills: 0 | Status: SHIPPED | OUTPATIENT
Start: 2022-11-23

## 2022-11-23 RX ADMIN — ACETAMINOPHEN 650 MG: 325 TABLET ORAL at 00:39

## 2022-11-23 RX ADMIN — CLOPIDOGREL BISULFATE 75 MG: 75 TABLET ORAL at 08:08

## 2022-11-23 RX ADMIN — DULOXETINE HYDROCHLORIDE 30 MG: 30 CAPSULE, DELAYED RELEASE ORAL at 08:08

## 2022-11-23 RX ADMIN — ONDANSETRON 4 MG: 4 TABLET, ORALLY DISINTEGRATING ORAL at 08:08

## 2022-11-23 RX ADMIN — INSULIN LISPRO 2 UNITS: 100 INJECTION, SOLUTION INTRAVENOUS; SUBCUTANEOUS at 08:08

## 2022-11-23 RX ADMIN — ATORVASTATIN CALCIUM 40 MG: 40 TABLET, FILM COATED ORAL at 08:08

## 2022-11-23 RX ADMIN — CYANOCOBALAMIN 1000 MCG: 1000 INJECTION, SOLUTION INTRAMUSCULAR; SUBCUTANEOUS at 08:08

## 2022-11-23 RX ADMIN — PANTOPRAZOLE SODIUM 40 MG: 40 TABLET, DELAYED RELEASE ORAL at 06:19

## 2022-11-23 RX ADMIN — LOSARTAN POTASSIUM 50 MG: 50 TABLET, FILM COATED ORAL at 08:08

## 2022-11-23 RX ADMIN — ASPIRIN 81 MG: 81 TABLET ORAL at 08:08

## 2022-11-23 RX ADMIN — METOPROLOL SUCCINATE 50 MG: 50 TABLET, EXTENDED RELEASE ORAL at 08:08

## 2022-11-23 RX ADMIN — CLOTRIMAZOLE 10 MG: 10 LOZENGE ORAL; TOPICAL at 08:08

## 2022-11-23 ASSESSMENT — PAIN DESCRIPTION - LOCATION: LOCATION: NECK

## 2022-11-23 ASSESSMENT — PAIN DESCRIPTION - DESCRIPTORS: DESCRIPTORS: ACHING

## 2022-11-23 ASSESSMENT — PAIN SCALES - GENERAL
PAINLEVEL_OUTOF10: 3
PAINLEVEL_OUTOF10: 1

## 2022-11-23 ASSESSMENT — PAIN DESCRIPTION - ORIENTATION: ORIENTATION: POSTERIOR

## 2022-11-23 NOTE — CARE COORDINATION
Pt is for discharge home today with no needs/supportive care orders received for CM at this time.   Pt will have close follow up with PCP  Both patient and spouse decoined Pt/ OT recommendations for home health at this time   Bedside RN was present at the time both declined as well  Milestones met    ASSESSMENT NOTE    Attending Physician: Linda Zheng MD  Admit Problem: Syncope and collapse [R55]  Closed head injury, initial encounter [S09.90XA]  Recurrent syncope [R55]  Date/Time of Admission: 11/21/2022 11:40 AM  Problem List:  Patient Active Problem List   Diagnosis    Diverticulosis    Recurrent depression ([facility]    Optic atrophy    Nonrheumatic aortic valve stenosis    Chronic angle-closure glaucoma    Post PTCA    Flexor tenosynovitis of finger    At high risk for falls    Type 2 diabetes mellitus with diabetic nephropathy, with long-term current use of insulin ([facility])    Non compliance w medication regimen    S/p TAVR (transcatheter aortic valve replacement), bioprosthetic    Dehydration, moderate    Neuropathy    Primary hypertension    Coronary artery disease involving native coronary artery of native heart    Murmur, cardiac    Type II diabetes mellitus with neurological manifestations, uncontrolled    Anatomical narrow angle borderline glaucoma    COPD (chronic obstructive pulmonary disease) ([facility]    Nearsightedness    Hyperlipemia    Osteoarthritis of multiple joints    Polyneuropathy associated with underlying disease ([facility]    Aortic valve stenosis    GERD (gastroesophageal reflux disease)    Infiltrating ductal carcinoma of breast, right (HCC)    Chronic systolic (congestive) heart failure    Recurrent syncope    Thrush, oral    Chronic heart failure with preserved ejection fraction (HFpEF) [facility])       Service Assessment  Patient Orientation Alert and Oriented, Person, Self   Cognition Dementia / Early Alzheimer's   History Provided By Medical Record, Patient   Primary Caregiver Spouse   Accompanied By/Relationship     Support Systems Spouse/Significant Other, Children, Family Members   Patient's Healthcare Decision Maker is: Legal Next of Ashley 69   PCP Verified by CM Yes (Willian Rogers MD)   Last Visit to PCP Within last 6 months   Prior Functional Level Assistance with the following:, Mobility, Shopping, Housework, Cooking   Current Functional Level Assistance with the following:, Mobility, Shopping, Housework, Cooking   Can patient return to prior living arrangement Yes   Ability to make needs known: Fair   Family able to assist with home care needs: Yes   Would you like for me to discuss the discharge plan with any other family members/significant others, and if so, who?  Yes (Spouse)   Financial Resources Medicare [facility])   Community Resources     CM/SW Referral       Social/Functional History  Lives With Spouse   Type of Home Mobile home   Home Layout One level   [facility] 46 to enter without rails   Entrance Stairs - Number of Steps 3   Entrance Stairs - Rails     Bathroom Shower/Tub     Bathroom Toilet     Bathroom Equipment 3-in-1 commode, Shower chair   [address], rolling, Rollator, [address] Help From Family   ADL Assistance Needs assistance   Bath     Dressing     Grooming     Feeding     [address] Needs assistance   Meal Prep     Laundry     Vacuuming     Cleaning     Gardening     Yard Work     Driving     Shopping          Other (Comment)     [address] Paying/Finance [address] [facility] Management     Other (Comment)     Ambulation Assistance Needs assistance   Transfer Assistance Independent   Active  No   Patient's  Info     Mode of Transportation Family   Education     Occupation Retired   Type of Occupation       Discharge Planning   Type of Residence Trailer/Mobile Home   Living Arrangements Spouse/Significant Other   Support Systems Spouse/Significant Other, Children, Family Members   Current Services Prior To Admission [address]   DME Shower Chair, Bedside Commode, [place] Marva, Wheelchair, Other (Comment) (Rollator)   DME     DME Ordered? Potential Assistance Purchasing Medications No   Meds-to-Beds: Does the patient want to have any new prescriptions delivered to bedside prior to discharge? Type of Home Care Services None   Patient expects to be discharged to: Trailer/mobile home   Follow Up Appointment: Best Day/Time     One/Two Story Residence: One story   # of Interior Steps     Height of Each Step (in)     [employer] Available     History of Falls? Yes     Services At/After Discharge  Transition of Care Consult (CM Consult): Discharge Planning   Internal Home Health     Internal Hospice     Reason Outside Agency [address]     Partner SNF     Reason Why Partner SNF Not Chosen     Internal Comfort Care     Reason Outside [address] Discharge     [employer] Provided? No   Mode of Transport at Discharge Other (see comment) [facility] Transport Time of Discharge     Confirm Follow Up Transport Family     Condition of Participation: Discharge Planning  The plan for Transition of Care is related to the following treatment goals: The Patient and/or Patient Representative was provided with a Choice of Provider? Patient Representative, Patient   Name of the Patient Representative who was provided with the Choice of Provider and agrees with the Discharge Plan? Brandi Church (pt) and Bonnie Willis (spouse)   The Patient and/or Patient Representative Agree with the Discharge Plan?      Freedom of Choice list was provided with basic dialogue that supports the individualized plan

## 2022-11-23 NOTE — DIABETES MGMT
Patient admitted with recurrent syncope. Spouse, Bill at bedside. Patient states that her spouse, Shantel Bush manages/administers all of her medications and monitors her blood glucose at home. Prior to admission medications per spouse include Levemir flex pen 20 units hs and Metformin. Spouse states that Novolog and Glyambi have been discontinued due to hypoglycemia. HbA1c 8.9% (EA). 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 Pt states that she was diagnosed with diabetes many years ago. Patient/ spouse state they do have a working glucometer with supplies at home, but its almost 11years old. Explained the importance of blood glucose monitoring to family and how this will provide their primary care provider with more information to help them safely titrate their regimen. Spouse states that she monitors pt's blood glucose 2-3x/day. Encouraged spouse to bring a log book of blood glucose readings to PCP appointments. Educated patient that they should try to get the glucometer covered by their insurance formulary to keep their costs down. Reviewed target blood glucose goals per American Diabetes Association recommendations. Patient will need prescription for glucometer kit, test strips, and lancets at discharge so that the patient may obtain the meter covered by their insurance. Patient voices that they have experienced hypoglycemia in the past. Educated regarding hypoglycemia signs, symptoms, and treatment. Patient reports no difficulty with affording their diabetic supplies. Educated patient/ spouse regarding current basal/bolus regimen of Lantus 20 units hs and Humalog correctional scale coverage 4x/day ac and hs, including type of insulin, timing with meals, onset, duration of effect, and peak of insulin dose. . Instructed patient/ spouse to always seek guidance from their

## 2022-11-23 NOTE — DISCHARGE SUMMARY
Hospitalist Discharge Summary   Admit Date:  2022 11:40 AM   DC Note date: 2022  Name:  Laverne Vines   Age:  81 y.o. Sex:  female  :  1940   MRN:  [id number]   Room:  [facility]  PCP:  Harley Laughlin MD    Presenting Complaint: Loss of Consciousness     Initial Admission Diagnosis: Syncope and collapse [R55]  Closed head injury, initial encounter [S09.90XA]  Recurrent syncope [R55]     Problem List for this Hospitalization (present on admission):    Principal Problem:    Recurrent syncope  Active Problems:    Infiltrating ductal carcinoma of breast, right ([address]    Thrush, oral    Chronic heart failure with preserved ejection fraction (HFpEF) ([facility])    Recurrent depression ([facility]    At high risk for falls    Type 2 diabetes mellitus with diabetic nephropathy, with long-term current use of insulin ([facility])    S/p TAVR (transcatheter aortic valve replacement), bioprosthetic    Primary hypertension    Coronary artery disease involving native coronary artery of native heart    COPD (chronic obstructive pulmonary disease) ([facility])    Polyneuropathy associated with underlying disease ([address]    GERD (gastroesophageal reflux disease)  Resolved Problems:    * No resolved hospital problems. *      Hospital Course:  Laverne Vines is a 81 y.o. 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 CT head w/ R frontal scalp hematoma. Thoracic spine XR, left wrist XR, humerus left XR and left shoulder XR with no acute findings. CT C-spine no acute findings, multilevel degenerative changes. CXR no acute findings. She was admitted to the Hospitalist service and Neurology was consulted. Reassuring EEG. Her symptoms are likely dysautonomia in setting of DM2.   Her B12 level was on the low side of normal and she received 2 IM injections while inpatient. Diabetes educator also saw the patient and her spouse; further instruction was provided to them prior to discharging. Her spouse takes care of her at home. Ms. Kristi Raymond will need to follow up with her PCP in 5-10 days for a check up and medication review. Ms. Kristi Raymond has had no further syncopal episodes since being admitted. PT/OT recommend Home health. She may be discharged home on Nov/23/2022. Recurrent syncope  Likely post prandial hypotension from DM autonomic/polyneuropathy  Fall, seizure precautions, PT/OT  Remote tele, check orthostats  EEG was negative for epileptiform discharges  Neurology recommendations appreciated  Instructions given prior to discharge, take care especially during meal times  Follow up with your PCP    Polyneuropathy associated with underlying disease ([address]  Glycemic control. Gabapentin 600 mg BID  Vitamin B12 1,000 mcg IM (received 2 doses while inpatient)  Recommend further B12 injections and repeat B12 level at PCP discretion    Thrush, oral  Start clotrimazole troch 10 mg TID x 7 days  Rx at discharge  FU with PCP if not improving, consider alternative diagnosis      Infiltrating ductal carcinoma of breast, right ([address]  Strong family history. Recently diagnosed. Treatment not yet started. GERD (gastroesophageal reflux disease)  Continue PPI     Chronic heart failure with preserved ejection fraction (HFpEF) ([address]  Last TTE with perserved LVEF, stable prosthestic AV valve. LV diastolic dysfunction. Clinically compensated. COPD (chronic obstructive pulmonary disease) (HCC)  Not in acute exacerbation. Albuterol prn      Coronary artery disease involving native coronary artery of native heart  Stable. Troponin negative. Cont DAPT, statin, BB, ARB     S/p TAVR (transcatheter aortic valve replacement), bioprosthetic  Stable.   Continue antiplatelets, statin, BB      Type 2 diabetes mellitus with diabetic nephropathy, with long-term current use of insulin (HCC)  Uncontrolled. Hold PO meds, start Lantus qhs plus moderate SSI. Consulted DM educator  Resume metformin and Levemir at discharge  Per patient, Glyxambi had been discontinued by her PCP prior to admission  Kit Rx at discharge  Follow up with your PCP     At high risk for falls  PT/OT recommends Home health      Recurrent depression ([address]  Resume cymbalta 30 mg daily and Elavil       Disposition: Home w/ Home health  Diet: ADULT DIET; Regular; 3 carb choices (45 gm/meal); Low Fat/Low Chol/High Fiber/TYRONE  ADULT ORAL NUTRITION SUPPLEMENT; Breakfast, Lunch, Dinner; Diabetic Oral Supplement  Code Status: Full Code    Follow Ups:   Follow-up Information     Fawad Ruiz MD Follow up in 1 week(s). Specialty: Family Medicine  Why: post discharge check up and medication review  Contact information:  [address] [facility] [phone]                       Time spent in patient discharge and coordination 38 minutes. Follow up labs/diagnostics (ultimately defer to outpatient provider): B12    Plan was discussed with the patient, her spouse, and Diabetes educator. All questions answered. Patient was stable at time of discharge. Instructions given to call a physician or return if any concerns. Current Discharge Medication List        START taking these medications    Details   clotrimazole (MYCELEX) 10 MG maryann Take 1 tablet by mouth 3 times daily for 15 doses  Qty: 15 tablet, Refills: 0           CONTINUE these medications which have NOT CHANGED    Details   atorvastatin (LIPITOR) 40 MG tablet Take 1 tablet by mouth daily  Qty: 90 tablet, Refills: 5    Associated Diagnoses: Coronary artery disease involving native coronary artery of native heart without angina pectoris; Post PTCA;  Mixed hyperlipidemia      clopidogrel (PLAVIX) 75 MG tablet Take 1 tablet by mouth daily  Qty: 90 tablet, Refills: 5    Associated Diagnoses: Coronary artery disease involving native coronary artery of native heart without angina pectoris; Post PTCA      DULoxetine (CYMBALTA) 30 MG extended release capsule Take 1 capsule by mouth daily  Qty: 90 capsule, Refills: 2    Associated Diagnoses: Other chronic pain      metFORMIN (GLUCOPHAGE) 500 MG tablet 1 twice a day, recommended irrespective of any other treatment for diabetes as first-line with GFR over 30  Qty: 180 tablet, Refills: 5    Associated Diagnoses: Type II diabetes mellitus with neurological manifestations, uncontrolled      losartan (COZAAR) 50 MG tablet Take 1 tablet by mouth daily  Qty: 90 tablet, Refills: 1    Associated Diagnoses: Type II diabetes mellitus with neurological manifestations, uncontrolled; Coronary artery disease involving native coronary artery of native heart without angina pectoris; Post PTCA      metoprolol succinate (TOPROL XL) 50 MG extended release tablet Take 1 tablet by mouth daily  Qty: 30 tablet, Refills: 3    Associated Diagnoses: Coronary artery disease involving native coronary artery of native heart without angina pectoris      Multiple Vitamins-Minerals (MULTIVITAMIN WITH MINERALS) tablet Once daily OTC  Qty: 100 tablet, Refills: 3      insulin detemir (LEVEMIR FLEXTOUCH) 100 UNIT/ML injection pen 20 units daily am  Qty: 5 Adjustable Dose Pre-filled Pen Syringe, Refills: 3      amitriptyline (ELAVIL) 25 MG tablet TAKE 1 TABLET BY MOUTH NIGHTLY. FOR SLEEP AND DEPRESSION  Qty: 60 tablet, Refills: 1      gabapentin (NEURONTIN) 600 MG tablet 1 tablet by mouth twice daily  Qty: 90 tablet, Refills: 1      omeprazole (PRILOSEC) 20 MG delayed release capsule Take 1 capsule by mouth in the morning. For heartburn/refluxTAKE 1 CAPSULE BY MOUTH EVERY DAY.   Qty: 90 capsule, Refills: 1      Lancets MISC To be used with dispensed glucometer to test 5 times daily      albuterol sulfate HFA (PROVENTIL;VENTOLIN;PROAIR) 108 (90 Base) MCG/ACT inhaler Inhale 1 puff into the lungs 3 times daily as needed      aspirin 81 MG chewable tablet Take 81 mg by mouth daily      hydrocortisone acetate (PROCTOCORT) 10 % external foam Place 1 applicator rectally every other day      hydrOXYzine (ATARAX) 25 MG tablet 1-2 every 8-12hours prn anxiety/sleep      polyethylene glycol (GLYCOLAX) 17 GM/SCOOP powder Take 17 g by mouth 2 times daily           STOP taking these medications       Empagliflozin-linaGLIPtin (GLYXAMBI) 25-5 MG TABS Comments:   Reason for Stopping:               Procedures done this admission:  * No surgery found *    Consults this admission:  IP CONSULT TO NEUROLOGY  IP CONSULT TO DIABETES MANAGEMENT    Echocardiogram results:  09/13/21    TRANSTHORACIC ECHOCARDIOGRAM (TTE) COMPLETE (CONTRAST/BUBBLE/3D PRN) 09/14/2021  7:28 AM, 09/14/2021 12:00 AM (Final)    Narrative  This is a summary report. The complete report is available in the patient's medical record. If you cannot access the medical record, please contact the sending organization for a detailed fax or copy. · AV: Aortic valve leaflet calcification present with reduced excursion. 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 Aortic valve mean gradient is 60 mmHg. Aortic valve area is 0.5 cm2. Critical aortic valve stenosis is present. Mild aortic valve regurgitation is present. · LV: Estimated LVEF is 55 - 60%. Normal systolic function (ejection fraction normal). Small left ventricle. Severe concentric hypertrophy. Wall motion: normal. Left ventricular diastolic dysfunction. · MV: Mild mitral annular calcification. Mild mitral valve regurgitation is present. CRITICAL AORTIC VALVE STENOSIS. RECOMMEND CONSIDERATION OF URGENT IN OFFICE CONSULTATION WITH A TAVR PROVIDER. REPORT FORWARDED TO PRIMARY CARE AS WELL AS TAVR TEAM    Signed by:  Andree Clark MD on 9/14/2021  7:28 AM, Signed by: Unknown Provider Result on 9/14/2021 12:00 AM      Diagnostic Imaging/Tests:   XR THORACIC SPINE (2 VIEWS)    Result Date: 11/21/2022  No acute radiographic abnormality. XR HUMERUS LEFT (MIN 2 VIEWS)    Result Date: 11/21/2022  No acute radiographic abnormality of the left shoulder or humerus. XR WRIST LEFT (MIN 3 VIEWS)    Result Date: 11/21/2022  No acute radiographic abnormality. CT HEAD WO CONTRAST    Result Date: 11/21/2022  Right frontal scalp hematoma. No acute intracranial abnormality. .     CT CERVICAL SPINE WO CONTRAST    Result Date: 11/21/2022  No acute traumatic abnormality of the cervical spine. XR SHOULDER LEFT (MIN 2 VIEWS)    Result Date: 11/21/2022  No acute radiographic abnormality of the left shoulder or humerus. XR CHEST PORTABLE    Result Date: 11/21/2022  No radiographic evidence of acute cardiopulmonary disease.         Labs: Results:       BMP, Mg, Phos Recent Labs     11/21/22  1143 11/22/22  0639    138   K 4.3 4.0    104   CO2 29 27   ANIONGAP 6 7   BUN 10 10   CREATININE 0.90 0.70   LABGLOM >60 >60   CALCIUM 9.6 9.3   GLUCOSE 320* 294*   MG 1.8 2.2      CBC Recent Labs     11/21/22  1143 11/22/22  0639   WBC 9.1 7.2   RBC 4.03* 3.83*   HGB 12.9 12.4   HCT 36.9 34.7*   MCV 91.6 90.6   MCH 32.0 32.4   MCHC 35.0 35.7*   RDW 12.3 12.0    135*   MPV 10.4 10.2   NRBC 0.00 0.00   SEGS 85*  --    LYMPHOPCT 9*  --    EOSRELPCT 1  --    MONOPCT 5  --    BASOPCT 0  --    IMMGRAN 0  --    SEGSABS 7.7  --    LYMPHSABS 0.8  --    EOSABS 0.1  --    MONOSABS 0.5  --    BASOSABS 0.0  --    ABSIMMGRAN 0.0  --       LFT Recent Labs     11/21/22  1143   BILITOT 0.6   ALKPHOS 141*   AST 11*   ALT 17   PROT 7.1   LABALBU 3.4   GLOB 3.7      Cardiac  Lab Results   Component Value Date/Time    TROPHS 7.9 11/21/2022 11:43 AM      Coags Lab Results   Component Value Date/Time    PROTIME 14.2 11/22/2021 08:22 AM    INR 1.1 11/22/2021 08:22 AM      A1c Lab Results   Component Value Date/Time    LABA1C 8.9 11/22/2022 06:39 AM    LABA1C 10.7 09/27/2022 07:35 PM    LABA1C 10.8 04/22/2022 11:58 AM     11/22/2022 06:39 AM  09/27/2022 07:35 PM     04/22/2022 11:58 AM      Lipids Lab Results   Component Value Date/Time    CHOL 192 10/01/2021 04:45 AM    LDLCALC 105 10/01/2021 04:45 AM    LABVLDL 39 10/01/2021 04:45 AM    HDL 48 10/01/2021 04:45 AM    CHOLHDLRATIO 4.0 10/01/2021 04:45 AM    TRIG 195 10/01/2021 04:45 AM      Thyroid  Lab Results   Component Value Date/Time    TSHELE 1.38 11/22/2022 06:39 AM    NKB1NCI 0.993 09/27/2022 07:35 PM        Most Recent UA Lab Results   Component Value Date/Time    COLORU YELLOW/STRAW 11/21/2022 02:29 PM    APPEARANCE CLOUDY 11/21/2022 02:29 PM    SPECGRAV 1.025 11/21/2022 02:29 PM    LABPH 6.5 11/21/2022 02:29 PM    PROTEINU Negative 11/21/2022 02:29 PM    GLUCOSEU >1000 11/21/2022 02:29 PM    KETUA Negative 11/21/2022 02:29 PM    BILIRUBINUR Negative 11/21/2022 02:29 PM    BILIRUBINUR Negative 11/22/2021 08:22 AM    BLOODU Negative 11/21/2022 02:29 PM    UROBILINOGEN 1.0 11/21/2022 02:29 PM    NITRU Negative 11/21/2022 02:29 PM    LEUKOCYTESUR MODERATE 11/21/2022 02:29 PM    RBCUA 10-20 11/21/2022 02:29 PM    BACTERIA 0 11/21/2022 02:29 PM        No results for input(s): CULTURE in the last 720 hours. All Labs from Last 24 Hrs:  Recent Results (from the past 24 hour(s))   POCT Glucose    Collection Time: 11/22/22 11:36 AM   Result Value Ref Range    POC Glucose 274 (H) 65 - 100 mg/dL    Performed by:  Arvin    POCT Glucose    Collection Time: 11/22/22  3:56 PM   Result Value Ref Range    POC Glucose 166 (H) 65 - 100 mg/dL    Performed by: Bryson Hickey    POCT Glucose    Collection Time: 11/22/22  8:44 PM   Result Value Ref Range    POC Glucose 201 (H) 65 - 100 mg/dL    Performed by: Maddie Pham    POCT Glucose    Collection Time: 11/23/22  7:30 AM   Result Value Ref Range    POC Glucose 200 (H) 65 - 100 mg/dL    Performed by: Emma        Allergies   Allergen Reactions    Meperidine Nausea And Vomiting     Stomach upset    Morphine Nausea And Vomiting Stomach upset     Immunization History   Administered Date(s) Administered    COVID-19, J&J, (age 18y+), IM, 0.5 mL 05/14/2021    Influenza Quadv 09/19/2016    Influenza Virus Vaccine 01/05/2015, 11/11/2018    Influenza, FLUAD, (age 72 y+), Adjuvanted, 0.5mL 11/16/2020    Influenza, FLUCELVAX, (age 10 mo+), MDCK, PF, 0.5mL 09/27/2022    Influenza, High Dose (Fluzone 65 yrs and older) 11/02/2018, 10/07/2021    Influenza, Triv, inactivated, subunit, adjuvanted, IM (Fluad 65 yrs and older) 02/04/2020    Influenza, Trivalent PF 10/05/2015    Pneumococcal Conjugate 13-valent ([id number]) 10/23/2014    Pneumococcal Polysaccharide ([id number]) 01/13/2017    Tdap (Boostrix, Adacel) 05/15/2018       Recent Vital Data:  Patient Vitals for the past 24 hrs:   Temp Pulse Resp BP SpO2   11/23/22 1031 98.6 °F (37 °C) 59 18 127/66 96 %   11/23/22 0729 98.4 °F (36.9 °C) 65 18 139/73 97 %   11/23/22 0230 98.1 °F (36.7 °C) 67 16 (!) 148/78 95 %   11/23/22 0218 -- 69 -- -- --   11/22/22 2236 98.1 °F (36.7 °C) 71 16 (!) 160/75 96 %   11/22/22 1916 98.1 °F (36.7 °C) 68 -- (!) 148/75 93 %   11/22/22 1600 99.3 °F (37.4 °C) 64 16 (!) 117/54 99 %   11/22/22 1149 97.6 °F (36.4 °C) 60 17 123/60 99 %       Oxygen Therapy  SpO2: 96 %  Pulse via Oximetry: 70 beats per minute  O2 Device: None (Room air)    Estimated body mass index is 30.2 kg/m² as calculated from the following:    Height as of this encounter: 5' (1.524 m). Weight as of this encounter: 154 lb 10 oz (70.1 kg). Intake/Output Summary (Last 24 hours) at 11/23/2022 1054  Last data filed at 11/23/2022 0606  Gross per 24 hour   Intake --   Output 800 ml   Net -800 ml         Physical Exam:    General:    No overt distress  Head:  Bruising to R side of face  Eyes:  Sclerae appear normal.  Pupils equally round. HENT:  Nares appear normal, no drainage. Moist mucous membranes  Neck:  No restricted ROM. Trachea midline  CV:   RRR. No m/r/g. Lungs: No wheezing, rhonchi, or rales. Respirations even, unlabored on room air. Abdomen:   Soft, nontender, nondistended. Extremities: Warm and dry. No cyanosis or clubbing. Skin:     No rashes. Normal coloration  Neuro:  A&Ox3  Psych:  Normal mood and affect. Signed:  GREGG Milligan CNP    Part of this note may have been written by using a voice dictation software. The note has been proof read but may still contain some grammatical/other typographical errors.

## 2022-11-23 NOTE — DIABETES MGMT
Patient admitted with recurrent syncope. Blood glucose ranged 166-306 yesterday with patient receiving Lantus 21 units and Humalog 8 units. Blood glucose this morning was 200. Reviewed patient current regimen: Lantus 21 units HS and Humalog correctional insulin. Patient would likely benefit from a small increase in basal insulin as fasting glucose is not at goal.  Provider updated via [employer] regarding recommendation and glycemic control.

## 2022-11-23 NOTE — PLAN OF CARE
Problem: Pain  Goal: Verbalizes/displays adequate comfort level or baseline comfort level  Outcome: Adequate for Discharge     Problem: Skin/Tissue Integrity  Goal: Absence of new skin breakdown  Description: 1. Monitor for areas of redness and/or skin breakdown  2. Assess vascular access sites hourly  3. Every 4-6 hours minimum:  Change oxygen saturation probe site  4. Every 4-6 hours:  If on nasal continuous positive airway pressure, respiratory therapy assess nares and determine need for appliance change or resting period.   Outcome: Adequate for Discharge     Problem: Safety - Adult  Goal: Free from fall injury  Outcome: Adequate for Discharge  Flowsheets (Taken 11/23/2022 0237 by Joy Santo RN)  Free From Fall Injury: Instruct family/caregiver on patient safety     Problem: ABCDS Injury Assessment  Goal: Absence of physical injury  Outcome: Adequate for Discharge  Flowsheets (Taken 11/23/2022 0237 by Joy Santo, RN)  Absence of Physical Injury: Implement safety measures based on patient assessment

## 2022-11-25 ENCOUNTER — CARE COORDINATION (OUTPATIENT)
Dept: CARE COORDINATION | Facility: CLINIC | Age: 82
End: 2022-11-25

## 2022-11-25 NOTE — CARE COORDINATION
[facility] Care Transitions Initial Follow Up Call    Call within 2 business days of discharge: Yes    LPN Care Coordinator contacted the family by telephone to perform post hospital discharge assessment. Verified name and  with family as identifiers. Provided introduction to self, and explanation of the LPN Care Coordinator role. Patient: Bertha Moss Patient : [birth year]   MRN: [id number]  Reason for Admission: Recurrent syncope  Discharge Date: 22 RARS: No data recorded    Last Discharge  Street       Date Complaint Diagnosis Description Type Department Provider    22 Loss of Consciousness Syncope and collapse . .. ED to Hosp-Admission (Discharged) (ADMITTED) Wang Gallegos MD; Jan Perry. .. Was this an external facility discharge? No Discharge Facility: [facility]    Challenges to be reviewed by the provider   Additional needs identified to be addressed with provider: No  none               Method of communication with provider: none. Mr Thomas Varela states patient is doing well from discharge, verifies upcoming pcp visit and acknowledges this may be changed d/t conflicting visit with his doctor at the [facility] that he can not miss. No needs or concerns voiced at this time. LPN Care Coordinator reviewed discharge instructions with family who verbalized understanding. The family was given an opportunity to ask questions and does not have any further questions or concerns at this time. Were discharge instructions available to patient? Yes. Reviewed appropriate site of care based on symptoms and resources available to patient including: PCP. The family agrees to contact the PCP office for questions related to their healthcare. Advance Care Planning:   Does patient have an Advance Directive: reviewed and current. Medication review of discharge medication was performed with family, who verbalizes understanding of administration of home medications.  Medications reviewed, 1111F entered: N/A    Was patient discharged with a pulse oximeter? no    Non-face-to-face services provided:  Obtained and reviewed discharge summary and/or continuity of care documents    Offered patient enrollment in the Remote Patient Monitoring (RPM) program for in-home monitoring: NA.    Care Transitions 24 Hour Call    Schedule Follow Up Appointment with PCP: Completed  Do you have a copy of your discharge instructions?: Yes  Do you have all of your prescriptions and are they filled?: Yes  Have you been contacted by a [facility] Pharmacist?: No  Have you scheduled your follow up appointment?: Yes  How are you going to get to your appointment?: Car - family or friend to transport  Do you have support at home?: Partner/Spouse/SO  Do you feel like you have everything you need to keep you well at home?: Yes  Care Transitions Interventions  No Identified Needs         Follow Up  Future Appointments   Date Time Provider Kailye Velarde   11/28/2022  9:45 AM Celestino Parra MD [facility] AM[facility]   1/4/2023 10:40 AM [facility]   1/4/2023 11:15 AM Joyce Kovacs MD [facility] AMB       [facility] Care Coordinator provided contact information. Plan for follow up call in 10 to 14 days  based on severity of symptoms and risk factors.   Plan for next call: follow-up appointment-assess attendance/rescheduling and compliance with plan of care    Jam Ocasio LPN

## 2022-12-08 ENCOUNTER — CARE COORDINATION (OUTPATIENT)
Dept: CARE COORDINATION | Facility: CLINIC | Age: 82
End: 2022-12-08

## 2022-12-08 NOTE — CARE COORDINATION
[facility] Care Transitions Follow Up Call    LPN Care Coordinator contacted the patient by telephone to follow up after admission on 22. Verified name and  with patient as identifiers. Patient: Margo Matthews  Patient : 1940   MRN: [id number]  Reason for Admission: Recurrent syncope  Discharge Date: 22 RARS: No data recorded    Needs to be reviewed by the provider   Additional needs identified to be addressed with provider: No  none             Method of communication with provider: none. Patient states she is doing well, \"I've not had any falls\". Discussed missed pcp visit, patient states she had forgotten about that visit, declines to reschedule at this time as she has a follow up with Dr. Augusto Samuels 23, she does agree to call pcp with any changes in condition. Addressed changes since last contact:  none  Discussed follow-up appointments. If no appointment was previously scheduled, appointment scheduling offered: Yes. Is follow up appointment scheduled within 7 days of discharge? No.    Follow Up  Future Appointments   Date Time Provider Kailey Velarde   2023 10:40 AM [facility]   2023 11:15 AM Glenora Severin, MD [facility] G[facility] AMB     Non-[facility] follow up appointment(s): berny    LPN Care Coordinator reviewed medical action plan with patient and discussed any barriers to care and/or understanding of plan of care after discharge. Discussed appropriate site of care based on symptoms and resources available to patient including: PCP  Specialist  When to call 911. The patient agrees to contact the PCP office for questions related to their healthcare. Advance Care Planning:   reviewed and current.      Patients top risk factors for readmission:  comorbidities  Interventions to address risk factors:  attend follow up visits as scheduled    Offered patient enrollment in the Remote Patient Monitoring (RPM) program for in-home monitoring: NA.     Care Transitions Subsequent and Final Call    Subsequent and Final Calls  Do you have any ongoing symptoms?: No  Have your medications changed?: No  Do you have any questions related to your medications?: No  Do you currently have any active services?: No  Do you have any needs or concerns that I can assist you with?: No  Identified Barriers: None  Care Transitions Interventions  No Identified Needs  Other Interventions:             LPN Care Coordinator provided contact information for future needs. Plan for follow-up call in 10-14 days based on severity of symptoms and risk factors.   Plan for next call:  compliance with plan of care    Jensen Aden LPN

## 2022-12-21 ENCOUNTER — CARE COORDINATION (OUTPATIENT)
Dept: CARE COORDINATION | Facility: CLINIC | Age: 82
End: 2022-12-21

## 2022-12-21 NOTE — CARE COORDINATION
Patient has graduated from the Care Transitions program on 12.21.22. Attempted outreach follow up without success, unable to leave message. No new needs noted on chart review. Patient/family has the ability to self-manage at this time. Patient has no further care management needs, no referral to the [facility] team for further management. Patient has [facility] Care Coordinator's contact information for any further questions, concerns, or needs.   Patients upcoming visits:    Future Appointments   Date Time Provider Kailey Velarde   1/4/2023 10:40 AM Jackelyn [address] [place] [address]   1/4/2023 11:15 AM Alfredo Alexis MD [facility] GVL AMB

## 2022-12-22 ASSESSMENT — ENCOUNTER SYMPTOMS
RHINORRHEA: 0
ANAL BLEEDING: 0
APNEA: 0
ABDOMINAL DISTENTION: 0
NAUSEA: 0
ABDOMINAL PAIN: 0
EYE DISCHARGE: 0
SHORTNESS OF BREATH: 0
EYE REDNESS: 0
CHEST TIGHTNESS: 0
VOMITING: 0
SORE THROAT: 0
EYE PAIN: 0
EYE ITCHING: 0
COUGH: 0
DIARRHEA: 0
BACK PAIN: 0
COLOR CHANGE: 0
WHEEZING: 0

## 2022-12-26 DIAGNOSIS — I25.10 CORONARY ARTERY DISEASE INVOLVING NATIVE CORONARY ARTERY OF NATIVE HEART WITHOUT ANGINA PECTORIS: ICD-10-CM

## 2022-12-27 RX ORDER — METOPROLOL SUCCINATE 50 MG/1
TABLET, EXTENDED RELEASE ORAL
Qty: 90 TABLET | Refills: 3 | Status: SHIPPED | OUTPATIENT
Start: 2022-12-27

## 2022-12-30 DIAGNOSIS — C50.911 MALIGNANT NEOPLASM OF RIGHT FEMALE BREAST, UNSPECIFIED ESTROGEN RECEPTOR STATUS, UNSPECIFIED SITE OF BREAST (HCC): ICD-10-CM

## 2022-12-30 DIAGNOSIS — Z17.0 MALIGNANT NEOPLASM OF LOWER-OUTER QUADRANT OF RIGHT BREAST OF FEMALE, ESTROGEN RECEPTOR POSITIVE (HCC): Primary | ICD-10-CM

## 2022-12-30 DIAGNOSIS — C50.511 MALIGNANT NEOPLASM OF LOWER-OUTER QUADRANT OF RIGHT BREAST OF FEMALE, ESTROGEN RECEPTOR POSITIVE (HCC): Primary | ICD-10-CM

## 2023-01-10 ENCOUNTER — OFFICE VISIT (OUTPATIENT)
Dept: PRIMARY CARE CLINIC | Facility: CLINIC | Age: 83
End: 2023-01-10
Payer: MEDICARE

## 2023-01-10 VITALS
HEART RATE: 64 BPM | BODY MASS INDEX: 27.8 KG/M2 | OXYGEN SATURATION: 99 % | HEIGHT: 60 IN | RESPIRATION RATE: 14 BRPM | DIASTOLIC BLOOD PRESSURE: 60 MMHG | TEMPERATURE: 97.3 F | WEIGHT: 141.6 LBS | SYSTOLIC BLOOD PRESSURE: 134 MMHG

## 2023-01-10 DIAGNOSIS — Z79.4 TYPE 2 DIABETES MELLITUS WITH DIABETIC NEPHROPATHY, WITH LONG-TERM CURRENT USE OF INSULIN (HCC): Chronic | ICD-10-CM

## 2023-01-10 DIAGNOSIS — E78.41 ELEVATED LIPOPROTEIN(A): ICD-10-CM

## 2023-01-10 DIAGNOSIS — I35.0 NONRHEUMATIC AORTIC VALVE STENOSIS: ICD-10-CM

## 2023-01-10 DIAGNOSIS — Z79.899 MEDICATION MANAGEMENT: ICD-10-CM

## 2023-01-10 DIAGNOSIS — E11.00 DM HYPEROSMOLARITY TYPE II, UNCONTROLLED (HCC): ICD-10-CM

## 2023-01-10 DIAGNOSIS — F03.A3 MILD DEMENTIA WITH MOOD DISTURBANCE, UNSPECIFIED DEMENTIA TYPE: ICD-10-CM

## 2023-01-10 DIAGNOSIS — Z98.61 POST PTCA: ICD-10-CM

## 2023-01-10 DIAGNOSIS — Z95.3 S/P TAVR (TRANSCATHETER AORTIC VALVE REPLACEMENT), BIOPROSTHETIC: ICD-10-CM

## 2023-01-10 DIAGNOSIS — E11.21 TYPE 2 DIABETES MELLITUS WITH DIABETIC NEPHROPATHY, WITH LONG-TERM CURRENT USE OF INSULIN (HCC): Chronic | ICD-10-CM

## 2023-01-10 DIAGNOSIS — F33.9 RECURRENT DEPRESSION (HCC): ICD-10-CM

## 2023-01-10 DIAGNOSIS — C50.911 INFILTRATING DUCTAL CARCINOMA OF BREAST, RIGHT (HCC): ICD-10-CM

## 2023-01-10 DIAGNOSIS — E11.65 DM HYPEROSMOLARITY TYPE II, UNCONTROLLED (HCC): ICD-10-CM

## 2023-01-10 DIAGNOSIS — Z78.9 SELF-CARE DEFICIT: ICD-10-CM

## 2023-01-10 DIAGNOSIS — F03.A3 MILD DEMENTIA WITH MOOD DISTURBANCE, UNSPECIFIED DEMENTIA TYPE: Primary | ICD-10-CM

## 2023-01-10 PROBLEM — G30.9 ALZHEIMER'S DISEASE, UNSPECIFIED (CODE) (HCC): Status: ACTIVE | Noted: 2023-01-10

## 2023-01-10 LAB
BASOPHILS # BLD: 0 K/UL (ref 0–0.2)
BASOPHILS NFR BLD: 0 % (ref 0–2)
DIFFERENTIAL METHOD BLD: ABNORMAL
EOSINOPHIL # BLD: 0 K/UL (ref 0–0.8)
EOSINOPHIL NFR BLD: 1 % (ref 0.5–7.8)
ERYTHROCYTE [DISTWIDTH] IN BLOOD BY AUTOMATED COUNT: 11.9 % (ref 11.9–14.6)
HCT VFR BLD AUTO: 33.4 % (ref 35.8–46.3)
HGB BLD-MCNC: 11.3 G/DL (ref 11.7–15.4)
IMM GRANULOCYTES # BLD AUTO: 0 K/UL (ref 0–0.5)
IMM GRANULOCYTES NFR BLD AUTO: 0 % (ref 0–5)
LYMPHOCYTES # BLD: 1 K/UL (ref 0.5–4.6)
LYMPHOCYTES NFR BLD: 14 % (ref 13–44)
MCH RBC QN AUTO: 31.7 PG (ref 26.1–32.9)
MCHC RBC AUTO-ENTMCNC: 33.8 G/DL (ref 31.4–35)
MCV RBC AUTO: 93.8 FL (ref 82–102)
MONOCYTES # BLD: 0.4 K/UL (ref 0.1–1.3)
MONOCYTES NFR BLD: 6 % (ref 4–12)
NEUTS SEG # BLD: 5.8 K/UL (ref 1.7–8.2)
NEUTS SEG NFR BLD: 79 % (ref 43–78)
NRBC # BLD: 0 K/UL (ref 0–0.2)
PLATELET # BLD AUTO: 232 K/UL (ref 150–450)
PMV BLD AUTO: 10.2 FL (ref 9.4–12.3)
RBC # BLD AUTO: 3.56 M/UL (ref 4.05–5.2)
WBC # BLD AUTO: 7.3 K/UL (ref 4.3–11.1)

## 2023-01-10 PROCEDURE — 3075F SYST BP GE 130 - 139MM HG: CPT | Performed by: FAMILY MEDICINE

## 2023-01-10 PROCEDURE — 1123F ACP DISCUSS/DSCN MKR DOCD: CPT | Performed by: FAMILY MEDICINE

## 2023-01-10 PROCEDURE — 99214 OFFICE O/P EST MOD 30 MIN: CPT | Performed by: FAMILY MEDICINE

## 2023-01-10 PROCEDURE — 3078F DIAST BP <80 MM HG: CPT | Performed by: FAMILY MEDICINE

## 2023-01-10 RX ORDER — DONEPEZIL HYDROCHLORIDE 5 MG/1
5 TABLET, FILM COATED ORAL NIGHTLY
Qty: 30 TABLET | Refills: 3 | Status: SHIPPED | OUTPATIENT
Start: 2023-01-10

## 2023-01-10 RX ORDER — QUETIAPINE FUMARATE 50 MG/1
TABLET, FILM COATED ORAL
Qty: 60 TABLET | Refills: 3 | Status: SHIPPED | OUTPATIENT
Start: 2023-01-10

## 2023-01-10 RX ORDER — INSULIN DETEMIR 100 [IU]/ML
INJECTION, SOLUTION SUBCUTANEOUS
Qty: 5 ADJUSTABLE DOSE PRE-FILLED PEN SYRINGE | Refills: 3 | Status: SHIPPED | OUTPATIENT
Start: 2023-01-10

## 2023-01-10 ASSESSMENT — ENCOUNTER SYMPTOMS
EYE PAIN: 0
RHINORRHEA: 0
SINUS PAIN: 0
COLOR CHANGE: 0
COUGH: 0
EYE REDNESS: 0
BACK PAIN: 0
SORE THROAT: 0
ABDOMINAL DISTENTION: 0
SHORTNESS OF BREATH: 0
EYE DISCHARGE: 0
DIARRHEA: 0
WHEEZING: 0
SINUS PRESSURE: 0
VOICE CHANGE: 0
CHEST TIGHTNESS: 0
NAUSEA: 0
CHOKING: 0
ABDOMINAL PAIN: 0
BLOOD IN STOOL: 0
PHOTOPHOBIA: 0
TROUBLE SWALLOWING: 0
CONSTIPATION: 0
VOMITING: 0

## 2023-01-10 ASSESSMENT — PATIENT HEALTH QUESTIONNAIRE - PHQ9
1. LITTLE INTEREST OR PLEASURE IN DOING THINGS: 0
SUM OF ALL RESPONSES TO PHQ QUESTIONS 1-9: 0
SUM OF ALL RESPONSES TO PHQ9 QUESTIONS 1 & 2: 0
2. FEELING DOWN, DEPRESSED OR HOPELESS: 0

## 2023-01-10 NOTE — PROGRESS NOTES
Here for follow-up of numerous medical problems as listed below. She also has some memory impairment. Recently diagnosed with breast cancer has oncology follow-up. Poorly controlled diabetes blood sugar in the office more than 300. He came with her  who is a 70 years much younger than her and primary caregiver. Their son also had pancreatic cancer which is metastatic. She has some behavioral problems associated with dementia    Review of Systems   Constitutional:  Negative for activity change, appetite change, chills, diaphoresis, fatigue, fever and unexpected weight change. HENT:  Negative for congestion, ear pain, hearing loss, nosebleeds, rhinorrhea, sinus pressure, sinus pain, sore throat, trouble swallowing and voice change. Eyes:  Negative for photophobia, pain, discharge, redness and visual disturbance. Respiratory:  Negative for cough, choking, chest tightness, shortness of breath and wheezing. Cardiovascular:  Negative for chest pain, palpitations and leg swelling. Gastrointestinal:  Negative for abdominal distention, abdominal pain, blood in stool, constipation, diarrhea, nausea and vomiting. Endocrine: Negative for polydipsia, polyphagia and polyuria. Genitourinary:  Negative for difficulty urinating, dysuria, frequency, genital sores, hematuria and urgency. Musculoskeletal:  Negative for arthralgias, back pain, gait problem, joint swelling, myalgias and neck pain. Skin:  Negative for color change and rash. Neurological:  Negative for dizziness, tremors, seizures, syncope, speech difficulty, weakness, numbness and headaches. Hematological:  Negative for adenopathy. Does not bruise/bleed easily. Psychiatric/Behavioral:  Positive for agitation. Negative for behavioral problems, confusion, decreased concentration, hallucinations, self-injury, sleep disturbance and suicidal ideas. The patient is not nervous/anxious.          Memory impairment with mood disturbances self-care deficit. Sometimes walks around without clothes. Came with  who is a 66-year-old. Their son 59-year-old also diagnosed with pancreatic metastatic cancer. He is unable to cope with her at present      Physical Exam  Vitals and nursing note reviewed. Exam conducted with a chaperone present. Constitutional:       General: She is not in acute distress. Appearance: Normal appearance. She is obese. She is not ill-appearing or toxic-appearing. HENT:      Head: Normocephalic and atraumatic. Right Ear: External ear normal.      Left Ear: External ear normal.      Nose: Nose normal. No congestion or rhinorrhea. Mouth/Throat:      Mouth: Mucous membranes are moist.      Pharynx: No oropharyngeal exudate. Eyes:      General: No scleral icterus. Right eye: No discharge. Left eye: No discharge. Extraocular Movements: Extraocular movements intact. Conjunctiva/sclera: Conjunctivae normal.      Pupils: Pupils are equal, round, and reactive to light. Cardiovascular:      Rate and Rhythm: Normal rate and regular rhythm. Pulses: Normal pulses. Heart sounds: Normal heart sounds. No murmur heard. No gallop. Pulmonary:      Effort: Pulmonary effort is normal. No respiratory distress. Breath sounds: Normal breath sounds. No stridor. No wheezing, rhonchi or rales. Chest:      Chest wall: No tenderness. Abdominal:      General: Abdomen is flat. There is no distension. Palpations: Abdomen is soft. There is no mass. Tenderness: There is no abdominal tenderness. There is no right CVA tenderness, guarding or rebound. Hernia: No hernia is present. Genitourinary:     Vagina: No vaginal discharge. Musculoskeletal:         General: No swelling, tenderness, deformity or signs of injury. Normal range of motion. Cervical back: Normal range of motion and neck supple. No rigidity. Right lower leg: No edema.       Left lower leg: No edema.   Lymphadenopathy:      Cervical: No cervical adenopathy. Skin:     General: Skin is warm. Capillary Refill: Capillary refill takes less than 2 seconds. Coloration: Skin is not jaundiced or pale. Findings: No bruising, erythema, lesion or rash. Neurological:      General: No focal deficit present. Mental Status: She is alert and oriented to person, place, and time. Cranial Nerves: No cranial nerve deficit. Motor: No weakness. Coordination: Coordination normal.      Gait: Gait normal.   Psychiatric:         Attention and Perception: Attention normal.         Mood and Affect: Mood and affect normal. Mood is not anxious, depressed or elated. Affect is not labile, blunt, flat, angry, tearful or inappropriate. Speech: She is communicative. Speech is not delayed or tangential.         Behavior: Behavior normal. Behavior is not agitated, slowed, aggressive, withdrawn, hyperactive or combative. Cognition and Memory: Cognition is impaired. She exhibits impaired recent memory. Judgment: Judgment is impulsive and inappropriate. 1. Mild dementia with mood disturbance, unspecified dementia type. Mini cognitive testing 3 word recall clock time essentially normal suggestive of very mild age-appropriate memory impairment  Further evaluation management discussed. Counseling. May benefit from neurology consultation further input. Numerous medical problems with the memory impairment and self-care deficit. - Vitamin B12; Future  - [address] Neurology [facility]  - [address] [place] [address]    2. Type 2 diabetes mellitus with diabetic nephropathy, with long-term current use of insulin ([facility] [address]  Discussed management of diabetes poorly controlled blood sugar over 300. Increase Levemir to 15 units a.m. 10 units p.m. metformin low-dose  - Comprehensive Metabolic Panel; Future  - Hemoglobin A1C; Future    3.  DM hyperosmolarity type II, uncontrolled ([facility] [address]      4. S/p TAVR (transcatheter aortic valve replacement), bioprosthetic  Discussed risk factor management for coronary artery disease, single antiplatelet therapy may be appropriate    5. Nonrheumatic aortic valve stenosis      6. Infiltrating ductal carcinoma of breast, right [facility])  Oncology follow-up  - [facility]    7. Post PTCA      8. Recurrent depression ([facility] [address]  Continue antidepressant    9. Medication management    - CBC with Auto Differential; Future  - Comprehensive Metabolic Panel; Future  - TSH; Future    10. Elevated lipoprotein(a)    - LDL Cholesterol, Direct; Future    11. Self-care deficit    - [facility]     Low-dose Seroquel 50 mg half tablet at bedtime to start with for behavioral problems.   May benefit from assisted living facility as family having difficulty with coping with her  Isabel Montalvo MD

## 2023-01-11 LAB
ALBUMIN SERPL-MCNC: 3 G/DL (ref 3.2–4.6)
ALBUMIN/GLOB SERPL: 0.9 (ref 0.4–1.6)
ALP SERPL-CCNC: 109 U/L (ref 50–136)
ALT SERPL-CCNC: 11 U/L (ref 12–65)
ANION GAP SERPL CALC-SCNC: 7 MMOL/L (ref 2–11)
AST SERPL-CCNC: 9 U/L (ref 15–37)
BILIRUB SERPL-MCNC: 0.5 MG/DL (ref 0.2–1.1)
BUN SERPL-MCNC: 8 MG/DL (ref 8–23)
CALCIUM SERPL-MCNC: 9.2 MG/DL (ref 8.3–10.4)
CHLORIDE SERPL-SCNC: 102 MMOL/L (ref 101–110)
CO2 SERPL-SCNC: 31 MMOL/L (ref 21–32)
CREAT SERPL-MCNC: 0.9 MG/DL (ref 0.6–1)
EST. AVERAGE GLUCOSE BLD GHB EST-MCNC: 194 MG/DL
GLOBULIN SER CALC-MCNC: 3.5 G/DL (ref 2.8–4.5)
GLUCOSE SERPL-MCNC: 359 MG/DL (ref 65–100)
HBA1C MFR BLD: 8.4 % (ref 4.8–5.6)
LDLC SERPL DIRECT ASSAY-MCNC: 48 MG/DL
POTASSIUM SERPL-SCNC: 5 MMOL/L (ref 3.5–5.1)
PROT SERPL-MCNC: 6.5 G/DL (ref 6.3–8.2)
SODIUM SERPL-SCNC: 140 MMOL/L (ref 133–143)
TSH, 3RD GENERATION: 0.64 UIU/ML (ref 0.36–3.74)
VIT B12 SERPL-MCNC: 505 PG/ML (ref 193–986)

## 2023-01-12 ENCOUNTER — HOME CARE VISIT (OUTPATIENT)
Dept: HOSPICE | Facility: HOSPICE | Age: 83
End: 2023-01-12

## 2023-03-28 DIAGNOSIS — I25.10 CORONARY ARTERY DISEASE INVOLVING NATIVE CORONARY ARTERY OF NATIVE HEART WITHOUT ANGINA PECTORIS: ICD-10-CM

## 2023-03-28 DIAGNOSIS — Z98.61 POST PTCA: ICD-10-CM

## 2023-03-29 RX ORDER — LOSARTAN POTASSIUM 50 MG/1
TABLET ORAL
Qty: 90 TABLET | Refills: 3 | Status: SHIPPED | OUTPATIENT
Start: 2023-03-29

## 2025-04-24 NOTE — TELEPHONE ENCOUNTER
Pt was a no show for her diabetes assessment on 7/5/17. Left message for pt to call to r/s. No return phone calls. Will close chart. Thank you. Attending to bill

## (undated) DEVICE — BLADE SCALP SURG BARD-PARK 10 --

## (undated) DEVICE — BAND COMPR L21CM SHT CLR PLAS HEMSTAT EXT HK AND LOOP RETEN

## (undated) DEVICE — CATHETER DIAG AD 6FR L110CM 0.038IN COR POLYUR PGTL W/ 6

## (undated) DEVICE — GUIDEWIRE VASC L260CM DIA0.035IN RAD 3MM J TIP L7CM PTFE

## (undated) DEVICE — BLADE SURG NO15 S STL STR DISP GLASSVAN

## (undated) DEVICE — GUIDEWIRE VASC L260CM DIA0.035IN TAPR L11CM FLPY TIP L4CM

## (undated) DEVICE — CATH DIAG F5 AL I 100CM -- INFINITI - ORDER AS EA

## (undated) DEVICE — LOADING SYS L-EVPROP2329US: Brand: EVOLUT™ PRO+

## (undated) DEVICE — BLADE SAW W10XL54MM FOR PRI REPEAT STRNOTMY

## (undated) DEVICE — DELIV SYS D-EVPROP2329US: Brand: EVOLUT™ PRO+

## (undated) DEVICE — PINNACLE INTRODUCER SHEATH: Brand: PINNACLE

## (undated) DEVICE — Device: Brand: GRAND SLAM

## (undated) DEVICE — Device: Brand: PROWATER

## (undated) DEVICE — BLADE SURG NO20 S STL STR DISP GLASSVAN

## (undated) DEVICE — TRAY FOLEY 16FR TEMP SENS F400 --

## (undated) DEVICE — STOPCOCK ANGIO 1050PSI DK BLU L ROT M LUER 3 W OFF HNDL

## (undated) DEVICE — SOLUTION IRRIG 3000ML 0.9% SOD CHL FLX CONT 0797208] ICU MEDICAL INC]

## (undated) DEVICE — GUIDE NDL ST W/ 14 X 147CM TELESCOPICALLY FLD CIV FLX CVR

## (undated) DEVICE — HI-TORQUE WHISPER ES GUIDE WIRE .014 STRAIGHTTIP 3.0 CM X 190 CM: Brand: HI-TORQUE WHISPER

## (undated) DEVICE — KENDALL RADIOLUCENT FOAM MONITORING ELECTRODE RECTANGULAR SHAPE: Brand: KENDALL

## (undated) DEVICE — PDZ678 ZMED II 20X4.5X110: Brand: Z-MED II™

## (undated) DEVICE — PTCA DILATATION CATHETER: Brand: EMERGE™

## (undated) DEVICE — 3000CC GUARDIAN II: Brand: GUARDIAN

## (undated) DEVICE — BAND RADIAL COMPR ARTERY 24CM -- REG BX/10

## (undated) DEVICE — SUTURE NONABSORBABLE MONOFILAMENT 5-0 C-1 1X24 IN PROLENE 8725H

## (undated) DEVICE — GLIDESHEATH SLENDER STAINLESS STEEL KIT: Brand: GLIDESHEATH SLENDER

## (undated) DEVICE — SYR 50ML LR LCK 1ML GRAD NSAF --

## (undated) DEVICE — HI-TORQUE VERSACORE MODIFIED J GUIDE WIRE SYSTEM 145 CM: Brand: HI-TORQUE VERSACORE

## (undated) DEVICE — SUTURE VCRL SZ 4-0 L27IN ABSRB UD L19MM PS-2 3/8 CIR PRIM J426H

## (undated) DEVICE — CATHETER DIAG AD 6FR L100CM 0.038IN COR POLYUR INT MAMM

## (undated) DEVICE — GLOVE SURG SZ 7 L11.2IN THK9.8MIL STRW LTX POLYMER BEAD CUF

## (undated) DEVICE — SUTURE ETHBND EXCEL SZ 0 L18IN NONABSORBABLE GRN L36MM CT-1 CX21D

## (undated) DEVICE — CORONARY IMAGING CATHETER: Brand: OPTICROSS™ HD

## (undated) DEVICE — AMD ANTIMICROBIAL GAUZE SPONGES,12 PLY USP TYPE VII, 0.2% POLYHEXAMETHYLENE BIGUANIDE HCI (PHMB): Brand: CURITY

## (undated) DEVICE — NC TREK CORONARY DILATATION CATHETER 3.0 MM X 12 MM / RAPID-EXCHANGE: Brand: NC TREK

## (undated) DEVICE — PERCLOSE PROGLIDE™ SUTURE-MEDIATED CLOSURE SYSTEM: Brand: PERCLOSE PROGLIDE™

## (undated) DEVICE — CATHETER DIAG AD 5FR L110CM 145DEG COR GRY HYDRPHLC NYL

## (undated) DEVICE — CATHETER,URETHRAL,REDRUBBER,STRL,14FR: Brand: MEDLINE INDUSTRIES, INC.

## (undated) DEVICE — CATHETER GUID AD 6FR L100CM COR PERIPH GRN NYL PTFE XB L 3

## (undated) DEVICE — REM POLYHESIVE ADULT PATIENT RETURN ELECTRODE: Brand: VALLEYLAB

## (undated) DEVICE — GUIDEWIRE VASC L260CM DIA0.035IN COIL L15CM FLX TIP L4CM

## (undated) DEVICE — RADIFOCUS OPTITORQUE ANGIOGRAPHIC CATHETER: Brand: OPTITORQUE

## (undated) DEVICE — CATHETER THROMCTMY 6FR WRK L138CM EXTR FOR 2MM VES PRONTO

## (undated) DEVICE — SUTURE VCRL SZ 3-0 L36IN ABSRB UD L36MM CT-1 1/2 CIR J944H

## (undated) DEVICE — GUIDEWIRE VASC L150CM DIA0.035IN FLX TIP L7CM PTFE STR FIX

## (undated) DEVICE — CABG DR WILLIAMS: Brand: MEDLINE INDUSTRIES, INC.

## (undated) DEVICE — CANNULA NSL ORAL AD FOR CAPNOFLEX CO2 O2 AIRLFE

## (undated) DEVICE — CONNECTOR TBNG OD5-7MM O2 END DISP

## (undated) DEVICE — STRIP,CLOSURE,WOUND,MEDI-STRIP,1/2X4: Brand: MEDLINE

## (undated) DEVICE — Device

## (undated) DEVICE — SLEEVE CATH L60CM INTRO PRSSURE REPOS W/ HEMSTAS VLV PRSS

## (undated) DEVICE — MICROPUNCTURE INTRODUCER SET SILHOUETTE TRANSITIONLESS WITH NITINOL WIRE GUIDE: Brand: MICROPUNCTURE

## (undated) DEVICE — GUIDEWIRE VASC L300CM DIA0.014IN CHOICE EXTRA SUPP STR TIP

## (undated) DEVICE — 3M™ TEGADERM™ TRANSPARENT FILM DRESSING FRAME STYLE, 1626W, 4 IN X 4-3/4 IN (10 CM X 12 CM), 50/CT 4CT/CASE: Brand: 3M™ TEGADERM™

## (undated) DEVICE — INTRODUCER SHTH 6FR L11CM 0.038IN STD SIDEPRT EXTN 3 W

## (undated) DEVICE — BUTTON SWITCH PENCIL BLADE ELECTRODE, HOLSTER: Brand: EDGE

## (undated) DEVICE — PREP SKN CHLRAPRP APL 26ML STR --

## (undated) DEVICE — PINNACLE TIF INTRODUCER SHEATH: Brand: PINNACLE

## (undated) DEVICE — DRAPE SLUSH DISC W44XL66IN ST FOR RND BSIN HUSH SLUSH SYS

## (undated) DEVICE — CHECK-FLO PERFORMER INTRODUCER: Brand: PERFORMER

## (undated) DEVICE — CATH ANGI BLLN DIL 2.25X15MM -- NC EUPHORA

## (undated) DEVICE — NO DESCRIPTION: Brand: SENTINEL

## (undated) DEVICE — BLADE SCALP SURG BARD-PARK 11 --

## (undated) DEVICE — AIRLIFE™ OXYGEN TUBING 7 FEET (2.1 M) CRUSH RESISTANT OXYGEN TUBING, VINYL TIPPED: Brand: AIRLIFE™

## (undated) DEVICE — SOLUTION IRRIG 1000ML 09% SOD CHL USP PIC PLAS CONTAINER

## (undated) DEVICE — HI-TORQUE WHISPER MS GUIDE WIRE .014 STRAIGHT TIP 3.0 CM X 190 CM: Brand: HI-TORQUE WHISPER

## (undated) DEVICE — GUIDEWIRE 035IN 210CM PTFE COAT FIX COR J TIP 15MM FIRM BODY

## (undated) DEVICE — DRAPE SURG SPEC PROC 4 PC